# Patient Record
Sex: FEMALE | Race: WHITE | NOT HISPANIC OR LATINO | Employment: OTHER | URBAN - METROPOLITAN AREA
[De-identification: names, ages, dates, MRNs, and addresses within clinical notes are randomized per-mention and may not be internally consistent; named-entity substitution may affect disease eponyms.]

---

## 2017-07-17 ENCOUNTER — ALLSCRIPTS OFFICE VISIT (OUTPATIENT)
Dept: OTHER | Facility: OTHER | Age: 67
End: 2017-07-17

## 2017-07-17 DIAGNOSIS — M19.90 OSTEOARTHRITIS: ICD-10-CM

## 2017-07-17 DIAGNOSIS — Z00.00 ENCOUNTER FOR GENERAL ADULT MEDICAL EXAMINATION WITHOUT ABNORMAL FINDINGS: ICD-10-CM

## 2017-07-17 DIAGNOSIS — R01.1 CARDIAC MURMUR: ICD-10-CM

## 2017-07-17 DIAGNOSIS — Z87.19 PERSONAL HISTORY OF OTHER DISEASES OF THE DIGESTIVE SYSTEM: ICD-10-CM

## 2017-07-17 DIAGNOSIS — I34.1 NONRHEUMATIC MITRAL VALVE PROLAPSE: ICD-10-CM

## 2017-07-19 ENCOUNTER — TRANSCRIBE ORDERS (OUTPATIENT)
Dept: ADMINISTRATIVE | Facility: HOSPITAL | Age: 67
End: 2017-07-19

## 2017-07-19 DIAGNOSIS — I34.1 MITRAL VALVE PROLAPSE: Primary | ICD-10-CM

## 2017-07-25 ENCOUNTER — TRANSCRIBE ORDERS (OUTPATIENT)
Dept: ADMINISTRATIVE | Facility: HOSPITAL | Age: 67
End: 2017-07-25

## 2017-07-25 DIAGNOSIS — Z12.31 ENCOUNTER FOR MAMMOGRAM TO ESTABLISH BASELINE MAMMOGRAM: Primary | ICD-10-CM

## 2017-07-25 DIAGNOSIS — Z13.820 SCREENING FOR OSTEOPOROSIS: ICD-10-CM

## 2017-07-26 ENCOUNTER — GENERIC CONVERSION - ENCOUNTER (OUTPATIENT)
Dept: OTHER | Facility: OTHER | Age: 67
End: 2017-07-26

## 2017-07-26 LAB
A/G RATIO (HISTORICAL): 2 (ref 1.2–2.2)
ALBUMIN SERPL BCP-MCNC: 4.5 G/DL (ref 3.6–4.8)
ALP SERPL-CCNC: 84 IU/L (ref 39–117)
ALT SERPL W P-5'-P-CCNC: 18 IU/L (ref 0–32)
AST SERPL W P-5'-P-CCNC: 22 IU/L (ref 0–40)
BASOPHILS # BLD AUTO: 0 %
BASOPHILS # BLD AUTO: 0 X10E3/UL (ref 0–0.2)
BILIRUB SERPL-MCNC: 0.4 MG/DL (ref 0–1.2)
BUN SERPL-MCNC: 22 MG/DL (ref 8–27)
BUN/CREA RATIO (HISTORICAL): 29 (ref 12–28)
CALCIUM SERPL-MCNC: 9.8 MG/DL (ref 8.7–10.3)
CHLORIDE SERPL-SCNC: 101 MMOL/L (ref 96–106)
CHOLEST SERPL-MCNC: 264 MG/DL (ref 100–199)
CHOLEST/HDLC SERPL: 3.7 RATIO UNITS (ref 0–4.4)
CO2 SERPL-SCNC: 25 MMOL/L (ref 18–29)
CREAT SERPL-MCNC: 0.76 MG/DL (ref 0.57–1)
DEPRECATED RDW RBC AUTO: 12.8 % (ref 12.3–15.4)
EGFR AFRICAN AMERICAN (HISTORICAL): 95 ML/MIN/1.73
EGFR-AMERICAN CALC (HISTORICAL): 83 ML/MIN/1.73
EOSINOPHIL # BLD AUTO: 0.1 X10E3/UL (ref 0–0.4)
EOSINOPHIL # BLD AUTO: 2 %
GLUCOSE SERPL-MCNC: 101 MG/DL (ref 65–99)
HCT VFR BLD AUTO: 43.3 % (ref 34–46.6)
HDLC SERPL-MCNC: 71 MG/DL
HGB BLD-MCNC: 14.6 G/DL (ref 11.1–15.9)
IMM.GRANULOCYTES (CD4/8) (HISTORICAL): 0 %
IMM.GRANULOCYTES (CD4/8) (HISTORICAL): 0 X10E3/UL (ref 0–0.1)
INTERPRETATION (HISTORICAL): NORMAL
LDLC SERPL CALC-MCNC: 167 MG/DL (ref 0–99)
LYMPHOCYTES # BLD AUTO: 2.1 X10E3/UL (ref 0.7–3.1)
LYMPHOCYTES # BLD AUTO: 37 %
MCH RBC QN AUTO: 32.5 PG (ref 26.6–33)
MCHC RBC AUTO-ENTMCNC: 33.7 G/DL (ref 31.5–35.7)
MCV RBC AUTO: 96 FL (ref 79–97)
MONOCYTES # BLD AUTO: 0.4 X10E3/UL (ref 0.1–0.9)
MONOCYTES (HISTORICAL): 7 %
NEUTROPHILS # BLD AUTO: 3 X10E3/UL (ref 1.4–7)
NEUTROPHILS # BLD AUTO: 54 %
PLATELET # BLD AUTO: 248 X10E3/UL (ref 150–379)
POTASSIUM SERPL-SCNC: 4.5 MMOL/L (ref 3.5–5.2)
RBC (HISTORICAL): 4.49 X10E6/UL (ref 3.77–5.28)
SODIUM SERPL-SCNC: 142 MMOL/L (ref 134–144)
TOT. GLOBULIN, SERUM (HISTORICAL): 2.2 G/DL (ref 1.5–4.5)
TOTAL PROTEIN (HISTORICAL): 6.7 G/DL (ref 6–8.5)
TRIGL SERPL-MCNC: 131 MG/DL (ref 0–149)
VLDLC SERPL CALC-MCNC: 26 MG/DL (ref 5–40)
WBC # BLD AUTO: 5.6 X10E3/UL (ref 3.4–10.8)

## 2017-07-27 LAB
AMYLASE (HISTORICAL): 112 U/L (ref 31–124)
BACTERIA UR QL AUTO: NORMAL
BILIRUB UR QL STRIP: NEGATIVE
COLOR UR: YELLOW
COMMENT (HISTORICAL): CLEAR
FECAL OCCULT BLOOD DIAGNOSTIC (HISTORICAL): NEGATIVE
GLUCOSE (HISTORICAL): NEGATIVE
KETONES UR STRIP-MCNC: NEGATIVE MG/DL
LEUKOCYTE ESTERASE UR QL STRIP: NEGATIVE
LIPASE SERPL-CCNC: 46 U/L (ref 0–59)
MICROSCOPIC EXAMINATION (HISTORICAL): NORMAL
MICROSCOPIC EXAMINATION (HISTORICAL): NORMAL
MUCUS THREADS (HISTORICAL): PRESENT
NITRITE UR QL STRIP: NEGATIVE
NON-SQ EPI CELLS URNS QL MICRO: NORMAL /HPF
PH UR STRIP.AUTO: 6 [PH] (ref 5–7.5)
PROT UR STRIP-MCNC: NEGATIVE MG/DL
RBC (HISTORICAL): NORMAL /HPF
SP GR UR STRIP.AUTO: 1.02 (ref 1–1.03)
URINALYSIS (UA) (HISTORICAL): NORMAL
UROBILINOGEN UR QL STRIP.AUTO: 0.2 EU/DL (ref 0.2–1)
WBC # BLD AUTO: NORMAL /HPF

## 2017-07-28 ENCOUNTER — GENERIC CONVERSION - ENCOUNTER (OUTPATIENT)
Dept: OTHER | Facility: OTHER | Age: 67
End: 2017-07-28

## 2017-07-28 ENCOUNTER — HOSPITAL ENCOUNTER (OUTPATIENT)
Dept: NON INVASIVE DIAGNOSTICS | Facility: HOSPITAL | Age: 67
Discharge: HOME | End: 2017-07-28
Attending: FAMILY MEDICINE
Payer: COMMERCIAL

## 2017-07-28 DIAGNOSIS — I34.1 NONRHEUMATIC MITRAL VALVE PROLAPSE: ICD-10-CM

## 2017-07-28 DIAGNOSIS — R01.1 CARDIAC MURMUR: ICD-10-CM

## 2017-07-28 LAB
T4 FREE SERPL-MCNC: 1.1 NG/DL (ref 0.82–1.77)
TSH SERPL DL<=0.05 MIU/L-ACNC: 4.82 UIU/ML (ref 0.45–4.5)

## 2017-07-28 PROCEDURE — 93306 TTE W/DOPPLER COMPLETE: CPT

## 2017-08-18 ENCOUNTER — GENERIC CONVERSION - ENCOUNTER (OUTPATIENT)
Dept: OTHER | Facility: OTHER | Age: 67
End: 2017-08-18

## 2017-08-19 ENCOUNTER — GENERIC CONVERSION - ENCOUNTER (OUTPATIENT)
Dept: OTHER | Facility: OTHER | Age: 67
End: 2017-08-19

## 2017-08-19 DIAGNOSIS — E03.9 HYPOTHYROIDISM: ICD-10-CM

## 2017-08-19 DIAGNOSIS — E04.1 NONTOXIC SINGLE THYROID NODULE: ICD-10-CM

## 2017-08-23 ENCOUNTER — HOSPITAL ENCOUNTER (OUTPATIENT)
Dept: RADIOLOGY | Facility: HOSPITAL | Age: 67
Discharge: HOME | End: 2017-08-23
Payer: COMMERCIAL

## 2017-08-23 DIAGNOSIS — Z12.31 ENCOUNTER FOR MAMMOGRAM TO ESTABLISH BASELINE MAMMOGRAM: ICD-10-CM

## 2017-08-23 DIAGNOSIS — Z13.820 SCREENING FOR OSTEOPOROSIS: ICD-10-CM

## 2017-08-23 PROCEDURE — 77080 DXA BONE DENSITY AXIAL: CPT

## 2017-08-23 PROCEDURE — G0202 SCR MAMMO BI INCL CAD: HCPCS

## 2017-08-29 LAB — TSH SERPL DL<=0.05 MIU/L-ACNC: 3.28 UIU/ML (ref 0.45–4.5)

## 2017-08-31 ENCOUNTER — HOSPITAL ENCOUNTER (OUTPATIENT)
Dept: RADIOLOGY | Facility: HOSPITAL | Age: 67
Discharge: HOME | End: 2017-08-31
Attending: FAMILY MEDICINE
Payer: COMMERCIAL

## 2017-08-31 DIAGNOSIS — E03.9 HYPOTHYROIDISM: ICD-10-CM

## 2017-08-31 DIAGNOSIS — E04.1 NONTOXIC SINGLE THYROID NODULE: ICD-10-CM

## 2017-08-31 PROCEDURE — 76536 US EXAM OF HEAD AND NECK: CPT

## 2017-09-12 ENCOUNTER — GENERIC CONVERSION - ENCOUNTER (OUTPATIENT)
Dept: OTHER | Facility: OTHER | Age: 67
End: 2017-09-12

## 2017-11-27 ENCOUNTER — GENERIC CONVERSION - ENCOUNTER (OUTPATIENT)
Dept: OTHER | Facility: OTHER | Age: 67
End: 2017-11-27

## 2017-12-03 ENCOUNTER — GENERIC CONVERSION - ENCOUNTER (OUTPATIENT)
Dept: OTHER | Facility: OTHER | Age: 67
End: 2017-12-03

## 2017-12-14 ENCOUNTER — ALLSCRIPTS OFFICE VISIT (OUTPATIENT)
Dept: OTHER | Facility: OTHER | Age: 67
End: 2017-12-14

## 2017-12-15 NOTE — PROGRESS NOTES
Assessment  1  Subungual hematoma of finger, initial encounter (923 3) (S60 10XA)  2  Dog bite (879 8,E906 0) (W54  0XXA)    Plan  Dog bite    · Administered: Adacel 5-2-15 5 LF-MCG/0 5 Intramuscular Suspension  Subungual hematoma of finger, initial encounter    · Start: Cephalexin 500 MG Oral Capsule; TAKE 1 CAPSULE EVERY 12 HOURS DAILY   · Evacuate subungual hematoma - POC; Status:Active - Perform Order; Requestedfor:94Qfx2943;     Discussion/Summary    Update tetanus today  rto one week recheck, sooner if sx worsen or sx of infection develop  The patient was counseled regarding instructions for management,-- risk factor reductions,-- impressions,-- risks and benefits of treatment options,-- importance of compliance with treatment  Possible side effects of new medications were reviewed with the patient/guardian today  The treatment plan was reviewed with the patient/guardian  The patient/guardian understands and agrees with the treatment plan      Chief Complaint  Pt  presents with left thumb swelling/injury in nail bed  ck/lpn      Advance Directives  Advance Directive [facility]:  YES - Patient has an advance health care directive  History of Present Illness  HPI: pt here for new c/o injury to the left thumb  SHe reports her dog bit but did not penetrate the nail or the tissue of the thumb  SHe reports since that time a black area at the base of the nail and pain in the thumb  no tx at home  She states the pain is moderate at rest and severe with compression  Pt states her dog is fully immunized including rabies  Tetanus is outdated  Review of Systems   Constitutional: No fever, no chills, feels well, no tiredness, no recent weight gain or loss  ENT: no ear ache, no loss of hearing, no nosebleeds or nasal discharge, no sore throat or hoarseness  Cardiovascular: no complaints of slow or fast heart rate, no chest pain, no palpitations, no leg claudication or lower extremity edema    Respiratory: no complaints of shortness of breath, no wheezing, no dyspnea on exertion, no orthopnea or PND  Gastrointestinal: no complaints of abdominal pain, no constipation, no nausea or diarrhea, no vomiting, no bloody stools  Integumentary: as noted in HPI  Active Problems  1  Arthritis (716 90) (M19 90)  2  Encounter for screening for malignant neoplasm of colon (V76 51) (Z12 11)  3  Encounter for screening mammogram for malignant neoplasm of breast (V76 12) (Z12 31)  4  Heart murmur (785 2) (R01 1)  5  History of gastric ulcer (V12 79) (Z87 19)  6  Hypothyroidism (244 9) (E03 9)  7  Mitral valve prolapse (424 0) (I34 1)  8  Need for pneumococcal vaccination (V03 82) (Z23)  9  Thyroid cyst (246 2) (E04 1)  10  Welcome to Medicare preventive visit (V70 0) (Z00 00)    Past Medical History  1  History of acute bronchitis (V12 69) (Z87 09)  2  History of acute sinusitis (V12 69) (Z87 09)  3  History of endometriosis (V13 29) (Z87 42)  4  History of mitral valve prolapse (V12 59) (Z86 79)  5  History of ulceration (V13 89) (W88 857)    Family History  Mother   1  Family history of Cerebrovascular accident (CVA), unspecified mechanism  2  Family history of cardiac disorder (V17 49) (Z82 49)  Father   3  Family history of cardiac disorder (V17 49) (Z82 49)  Family History Reviewed: The family history was reviewed and updated today  Social History   · Former smoker (T51 24) (M13 158)  The social history was reviewed and updated today  Surgical History  1  History of Hysterectomy  Surgical History Reviewed: The surgical history was reviewed and updated today  Current Meds  1  Baby Aspirin 81 MG CHEW; 1 Every Day; Therapy: 89Yjw4005 to  Requested for: 52RSM6391 Recorded  2  Centrum Oral Tablet; Therapy: (Recorded:94Muf6367) to Recorded  3  Levothyroxine Sodium 25 MCG Oral Tablet; One tab po qd; Therapy: 73AMZ6816 to (Last Rx:31Oct2017)  Requested for: 31Oct2017 Ordered  4   Vagifem 10 MCG Vaginal Tablet; Therapy: (Recorded:76Ggl6218) to Recorded    The medication list was reviewed and updated today  Allergies  1  No Known Drug Allergies    Vitals   Recorded: 98INF2711 09:46AM   Temperature 98 6 F, Tympanic   Heart Rate 78, L Radial   Pulse Quality Normal, L Radial   Respiration Quality Normal   Respiration 14   Systolic 976, LUE, Sitting   Diastolic 80, LUE, Sitting   Height 5 ft 4 75 in   Weight 155 lb    BMI Calculated 25 99   BSA Calculated 1 77     Physical Exam   Constitutional  General appearance: No acute distress, well appearing and well nourished  Eyes  Conjunctiva and lids: No swelling, erythema or discharge  Ears, Nose, Mouth, and Throat  External inspection of ears and nose: Normal    Skin  Skin and subcutaneous tissue: Abnormal  -- sub uncal hematoma nail bed left thumb, moderate swelling of the thumb from the dip to tip, tender to palpation  Additional Exam:  informed consent obtained  Sterile prep with alcohol and Betadine  The subungual hematoma was decompressed with bovie tip with drainage of a moderate amount of blood  Pt pain level pre procedure was 10 and post procedure down to 5  Sensory intact and marked reduction in swelling post procedure  cap refill <3 sec at the finger tip, color pink  pt john procedure well  Attending Note  Collaborating Physician Note: Collaborating Note: I agree with the Advanced Practitioner note        Signatures   Electronically signed by : Kierra Miranda; Dec 14 2017 10:29AM EST                       (Author)    Electronically signed by : Alice Cortez DO; Dec 14 2017  5:05PM EST                       (Author)

## 2018-01-11 NOTE — RESULT NOTES
Verified Results  US THYROID 93Xnu6918 07:13AM Dagmar Slider    Order Number: [id number]    - Patient Instructions: To schedule this appointment, please contact Central Scheduling at [phone]  Test Name Result Flag Reference   US THYROID (Report)     THYROID ULTRASOUND     INDICATION: Hypothyroidism     COMPARISON: None  TECHNIQUE:  Ultrasound of the thyroid was performed with a high frequency linear transducer in transverse and sagittal planes including volumetric imaging sweeps as well as traditional still imaging technique  FINDINGS:   Thyroid parenchyma is diffusely heterogeneous in echotexture  There are numerous very tiny simple cysts throughout the thyroid  Right gland: 4 6 x 1 4 x 1 0 cm  There are no nodules that require follow-up or workup  Left gland: 5 1 x 1 1 x 1 1 cm  There are no nodules that require follow-up or workup  Isthmus: 0 3 cm in AP dimension  There are no nodules that require follow-up or workup  IMPRESSION:     Diffusely heterogeneous thyroid gland containing numerous very tiny simple cysts  There are no nodules that require follow-up or workup  Reference: 2015 American Thyroid Association Management Guidelines for Adult Patients with Thyroid Nodules and Differentiated Thyroid Cancer  Thyroid, Volume 26, Number 1, 2016               Workstation performed: [id number]     Signed by:   Juliet Vela MD   9/1/17     (1) TSH 25NYQ6121 09:02AM Dagmar Slider     Test Name Result Flag Reference   TSH 3 280 uIU/mL  0 450-4 500

## 2018-01-13 VITALS
RESPIRATION RATE: 16 BRPM | WEIGHT: 157 LBS | OXYGEN SATURATION: 97 % | BODY MASS INDEX: 26.16 KG/M2 | HEIGHT: 65 IN | HEART RATE: 76 BPM | DIASTOLIC BLOOD PRESSURE: 78 MMHG | SYSTOLIC BLOOD PRESSURE: 138 MMHG | TEMPERATURE: 98.7 F

## 2018-01-16 NOTE — PROGRESS NOTES
Assessment    1  Welcome to Medicare preventive visit (V70 0) (Z00 00)   2  Mitral valve prolapse (424 0) (I34 1)   3  Heart murmur (785 2) (R01 1)   4  Need for pneumococcal vaccination (V03 82) (Z23)    Plan  Arthritis, Health Maintenance, History of gastric ulcer, Mitral valve prolapse    · (1) AMYLASE; Status:Active; Requested for:25Seo8467;    · (1) CBC/PLT/DIFF; Status:Active; Requested for:04Kuj3200;    · (1) COMPREHENSIVE METABOLIC PANEL; Status:Active; Requested for:97Gpw3424;    · (1) LIPASE; Status:Active; Requested for:38Djj7446;    · (1) LIPID PANEL, FASTING; Status:Active; Requested for:08Eqj2012;    · (1) TSH WITH FT4 REFLEX; Status:Active; Requested for:55Bzz7464;    · (1) URINALYSIS w URINE C/S REFLEX (will reflex a microscopy if leukocytes, occult  blood, or nitrites are not within normal limits); Status:Active; Requested for:91Ymi9511; Health Maintenance    · * DXA BONE DENSITY SPINE HIP AND PELVIS; Status:Hold For - Scheduling; Requested  for:12Sek6756;    · *VB - Urinary Incontinence Screen (Dx Z13 89 Screen for UI); Status:Complete;   Done:  72NGO5053 01:14PM  Heart murmur, Mitral valve prolapse    · ECHO COMPLETE WITH CONTRAST IF INDICATED; Status:Need Information - Financial  Authorization; Requested for:35Cyg6742;   Need for pneumococcal vaccination    · Prevnar 13 Intramuscular Suspension    Discussion/Summary  Impression: Welcome to Medicare Visit, with preventive exam as well as age and risk appropriate counseling completed  Chief Complaint  pt here for Welcome to Medicare  Shelley ramachandran/jina      Advance Directives  Advance Directive [facility]:   The patient is in agreement to receive the Advance Care Planning service    YES - Patient has an advance health care directive  The patient has a living will located  in patient's home  The patient has a signed POLST form located in patient's home  Summary of Advance Directive Conversation  pt was given 5 wishes to read and review and return  History of Present Illness  The patient is being seen for the welcome to medicare visit  Medicare Screening and Risk Factors   Hospitalizations: no previous hospitalizations  Once per lifetime medicare screening tests: ECG has not been done and AAA screening US has not yet been done  Medicare Screening Tests Risk Questions   Osteoporosis risk assessment: , female gender and over 48years of age, but none indicated  HIV risk assessment: none indicated  Drug and Alcohol Use: The patient is a former cigarette smoker  The patient reports frequent alcohol use and drinking 2x drinks per week  Alcohol concern:   The patient has no concerns about alcohol abuse  She has never used illicit drugs  Diet and Physical Activity: Current diet includes well balanced meals  Exercise: walking 4-5 miles a day minutes per day  Mood Disorder and Cognitive Impairment Screening: She denies feeling down, depressed, or hopeless over the past two weeks  She denies feeling little interest or pleasure in doing things over the past two weeks  Cognitive impairment screening: denies difficulty learning/retaining new information, denies difficulty handling complex tasks, denies difficulty with reasoning, denies difficulty with spatial ability and orientation, denies difficulty with language and denies difficulty with behavior  Functional Ability/Level of Safety: Hearing is normal bilaterally, normal in the right ear and normal in the left ear  The patient is currently able to do activities of daily living without limitations, able to do instrumental activities of daily living without limitations, able to participate in social activities without limitations and able to drive without limitations   Activities of daily living details: does not need help using the phone, no transportation help needed, does not need help shopping, no meal preparation help needed, does not need help doing housework, does not need help doing laundry, does not need help managing medications and does not need help managing money  Fall risk factors: The patient fell 0 times in the past 12 months , no polypharmacy, no alcohol use, no mobility impairment, no antidepressant use, no deconditioning, no postural hypotension, no sedative use, no visual impairment, no urinary incontinence, no antihypertensive use, no cognitive impairment, up and go test was normal and no previous fall  Home safety risk factors:  no grab bars in the bathroom, but no unfamiliar surroundings, no loose rugs, no poor household lighting, no uneven floors, no household clutter and handrails on the stairs  Advance Directives: Advance directives: living will, durable power of  for health care directives and advance directives  end of life decisions were reviewed with the patient  Co-Managers and Medical Equipment/Suppliers: See Patient Care Team      Patient Care Team    Care Team Member Role Specialty Office Number   Christophe Saucedo MD  Dermatology [phone]   Dario Johnston MD  Vascular Surgery [phone]   Jerilyn Angulo MD  Ophthalmology [phone]     GI -Dr Villela     Review of Systems    Constitutional: negative  Eyes: negative  ENT: negative  Cardiovascular: MVP murmur  Respiratory: negative  Gastrointestinal: hx of gastric ulcer  Genitourinary: negative  Musculoskeletal: occ jt/mm pains  Integumentary and Breasts: skin lesion  Neurological: negative  Psychiatric: negative  Endocrine: negative  Hematologic and Lymphatic: negative  Active Problems    1  Arthritis (716 90) (M19 90)   2  History of gastric ulcer (V12 79) (Z87 19)   3  Mitral valve prolapse (424 0) (I34 1)   4  Need for pneumococcal vaccination (V03 82) (Z23)    Past Medical History    1  History of acute bronchitis (V12 69) (Z87 09)   2  History of acute sinusitis (V12 69) (Z87 09)   3   History of endometriosis (V13 29) (Z87 42)   4  History of mitral valve prolapse (V12 59) (Z86 79)   5  History of ulceration (V13 89) (E44 263)    Surgical History    1  History of Hysterectomy    hyster sec to adenomyosis(?)     Family History  Mother    1  Family history of Cerebrovascular accident (CVA), unspecified mechanism   2  Family history of cardiac disorder (V17 49) (Z82 49)  Father    3  Family history of cardiac disorder (V17 49) (Z82 49)    Social History    · Former smoker (F31 23) (B03 655)    Current Meds   1  Baby Aspirin 81 MG CHEW; 1 Every Day; Therapy: 43Xll4126 to  Requested for: 82GUU9213 Recorded   2  Centrum Oral Tablet; Therapy: (Recorded:38Ssq7933) to Recorded   3  Vagifem 10 MCG Vaginal Tablet; Therapy: (Recorded:84Pfl2231) to Recorded    Allergies    1  No Known Drug Allergies    Immunizations  Influenza --- [place]: 21-Sep-2010  (60y); Series2: 12-Nov-2012  (62y); Nadia Miss: 17-Oct-2016   (66y)   PPD --- Cristobal [place]field: 21-Sep-2010  (60y)     Vitals  Signs   Recorded: 18DIC3849 01:00PM   Temperature: 98 7 F, Temporal  Heart Rate: 76, R Radial  Pulse Quality: Normal, R Radial  Respiration Quality: Normal  Respiration: 16  Systolic: 430, RUE, Sitting  Diastolic: 78, RUE, Sitting  Height: 5 ft 4 75 in  Weight: 157 lb   BMI Calculated: 26 33  BSA Calculated: 1 78  O2 Saturation: 97    Results/Data  *VB - Urinary Incontinence Screen (Dx Z13 89 Screen for UI) 54HXC3450 01:14PM Greg Lehman     Test Name Result Flag Reference   Urinary Incontinence Assessment 93Flm5015       Falls Risk Assessment (Dx Z13 89 Screen for Neurologic Disorder) 13AKU1095 01:13PM User, [facility]     Test Name Result Flag Reference   Falls Risk      No falls in the past year     PHQ-2 Adult Depression Screening 85Vgd7253 01:12PM User, [facility]     Test Name Result Flag Reference   PHQ-2 Adult Depression Score 0     Over the last two weeks, how often have you been bothered by any of the following problems?   Little interest or pleasure in doing things: Not at all - 0  Feeling down, depressed, or hopeless: Not at all - 0   PHQ-2 Adult Depression Screening Negative       A 12 lead ECG was performed and was normal  No acute ischemia  Procedure    Procedure:  pt sees Dr Scar Mora yearly        Signatures   Electronically signed by : WILLIAMS Vicente ; Jul 19 2017  7:06AM EST                       (Author)

## 2018-01-16 NOTE — RESULT NOTES
Verified Results  ECHO COMPLETE WITH CONTRAST IF INDICATED 52Hou0437 08:10AM Shikha Winn Order Number: [id number]    - Patient Instructions: To schedule this appointment, please contact Central Scheduling at [phone]  Test Name Result Flag Reference   ECHO COMPLETE WITH CONTRAST IF INDICATED (Report)     [address]   [address] [facility]   [place] [address]   [phone]     Transthoracic Echocardiogram   2D and M-mode     Study date: 2017     Patient: Anirudh Sutton   MR number: [id number]   Account number: [de-identified]   : 1950   Age: 74 years   Gender: Female   Status: Routine   Location: Echo lab   Height: 67 in   Weight: 154 7 lb   BP: 130/ 81 mmHg     Indications: MVP     Diagnoses: 424 0 - MITRAL VALVE DISORDER     Sonographer: SKYLA Gauthier   Primary Physician: Laurent Mark MD   Referring Physician: William Daly MD   Group: Z[facility] Martin   Interpreting Physician: Martita Bella MD     SUMMARY     PROCEDURE INFORMATION:   Image quality was adequate  LEFT VENTRICLE:   Systolic function was normal by Teichholz  Ejection fraction was estimated in the range of 55 % to 60 % to be 61 %  There were no regional wall motion abnormalities  MITRAL VALVE:   By the updated ASE guidelines, there is no evidence of significant mitral valve prolapse     TRICUSPID VALVE:   The tricuspid jet envelope definition was inadequate for estimation of RV systolic pressure  There are no indirect findings (abnormal RV volume or geometry, altered pulmonary flow velocity profile, or leftward septal displacement) which   would suggest moderate or severe pulmonary hypertension  HISTORY: PRIOR HISTORY: Patient has no history of cardiovascular disease  PROCEDURE: The procedure was performed in the echo lab  This was a routine study  The transthoracic approach was used  The study included complete 2D imaging and M-mode   The heart rate was 45 bpm, at the start of the study  Images were   obtained from the parasternal, apical, subcostal, and suprasternal notch acoustic windows  Image quality was adequate  LEFT VENTRICLE: Size was normal  Systolic function was normal by Teichholz  Ejection fraction was estimated in the range of 55 % to 60 % to be 61 %  There were no regional wall motion abnormalities  Wall thickness was normal  No evidence   of apical thrombus  DOPPLER: Left ventricular diastolic function parameters were normal      RIGHT VENTRICLE: The size was normal  Systolic function was normal  Wall thickness was normal      LEFT ATRIUM: Size was normal      RIGHT ATRIUM: Size was normal      MITRAL VALVE: By the updated ASE guidelines, there is no evidence of significant mitral valve prolapse There was mild myxomatous thickening  There was normal leaflet separation  DOPPLER: The transmitral velocity was within the normal   range  There was no evidence for stenosis  There was no significant regurgitation  AORTIC VALVE: The valve was trileaflet  Leaflets exhibited mildly increased thickness and normal cuspal separation  DOPPLER: Transaortic velocity was within the normal range  There was no evidence for stenosis  There was no significant   regurgitation  TRICUSPID VALVE: The valve structure was normal  There was normal leaflet separation  DOPPLER: The transtricuspid velocity was within the normal range  There was no evidence for stenosis  There was trace regurgitation  The tricuspid jet   envelope definition was inadequate for estimation of RV systolic pressure  There are no indirect findings (abnormal RV volume or geometry, altered pulmonary flow velocity profile, or leftward septal displacement) which would suggest   moderate or severe pulmonary hypertension  PULMONIC VALVE: Leaflets exhibited normal thickness, no calcification, and normal cuspal separation  DOPPLER: The transpulmonic velocity was within the normal range   There was no significant regurgitation  PERICARDIUM: There was no pericardial effusion  The pericardium was normal in appearance  AORTA: The root exhibited normal size  SYSTEMIC VEINS: IVC: The inferior vena cava was normal in size  SYSTEM MEASUREMENT TABLES     2D mode   AoR Diam 2D: 3 1 cm   LA Diam (2D): 3 7 cm   LA/Ao (2D): 1 19   FS (2D Teich): 27 5 %   IVSd (2D): 0 91 cm   LVDEV: 100 cmï¾³   LVEDV MOD BP: 132 cmï¾³   LVESV: 46 8 cmï¾³   LVIDd(2D): 4 66 cm   LVISd (2D): 3 38 cm   LVOT Area 2D: 3 14 cm squared   LVPWd (2D): 1 04 cm   SV (Teich): 53 2 cmï¾³     Apical four chamber   LVEF A4C: 61 %     Apical two chamber   LVEF A2C: 60 %     Unspecified Scan Mode   JAJA Cont Eq (Peak Jose): 2 46 cm squared   LVOT Diam : 2 cm   LVOT Vmax: 884 mm/s   LVOT Vmax; Mean: 884 mm/s   Peak Grad ; Mean: 3 mm[Hg]   MV Peak A Jose: 573 mm/s   MV Peak E Jose  Mean: 652 mm/s   MVA (PHT): 4 58 cm squared   PHT: 48 ms   Max PG: 15 mm[Hg]   V Max: 2080 mm/s   Vmax: 1920 mm/s   RA Area: 15 cm squared   RA Volume: 40 6 cmï¾³   TAPSE: 2 1 cm     [employer] Accredited Echocardiography Laboratory     Prepared and electronically signed by     Justus Enamorado MD   Signed 28-Jul-2017 10:43:57     (1) LIPID PANEL, FASTING [id number] 09:01AM [facility]     Test Name Result Flag Reference   Cholesterol, Total 264 mg/dL H 100-199   Triglycerides 131 mg/dL  0-149   HDL Cholesterol 71 mg/dL  >39   VLDL Cholesterol Michael 26 mg/dL  5-40   LDL Cholesterol Calc 167 mg/dL H 0-99   T  Chol/HDL Ratio 3 7 ratio units  0 0-4 4   T  Chol/HDL Ratio                                                             Men  Women                                               1/2 Avg  Risk  3 4    3 3                                                   Avg Risk  5 0    4 4                                                2X Avg  Risk  9 6    7 1                                                3X Avg  Risk 23 4   11 0

## 2018-01-22 VITALS
HEIGHT: 65 IN | SYSTOLIC BLOOD PRESSURE: 122 MMHG | HEART RATE: 84 BPM | WEIGHT: 158 LBS | TEMPERATURE: 98.7 F | BODY MASS INDEX: 26.33 KG/M2 | DIASTOLIC BLOOD PRESSURE: 78 MMHG | RESPIRATION RATE: 16 BRPM

## 2018-01-22 VITALS
BODY MASS INDEX: 25.83 KG/M2 | TEMPERATURE: 98.6 F | HEART RATE: 78 BPM | DIASTOLIC BLOOD PRESSURE: 80 MMHG | WEIGHT: 155 LBS | SYSTOLIC BLOOD PRESSURE: 150 MMHG | RESPIRATION RATE: 14 BRPM | HEIGHT: 65 IN

## 2018-01-23 NOTE — RESULT NOTES
Verified Results  COLONOSCOPY [id number] 12:00AM [facility]     Test Name Result Flag Reference   Colonoscopy SEE SCANNED IMAGE        Summary / No summary entered :      No summary entered   Documents attached :      sColonoscopies - Madelin Arvind: [id number] - Image      Encounter - Sonia Escobar - (Family Medicine) (Result      Document)

## 2018-07-18 ENCOUNTER — OFFICE VISIT (OUTPATIENT)
Dept: FAMILY MEDICINE CLINIC | Facility: CLINIC | Age: 68
End: 2018-07-18
Payer: COMMERCIAL

## 2018-07-18 VITALS
BODY MASS INDEX: 25.36 KG/M2 | WEIGHT: 157.8 LBS | RESPIRATION RATE: 14 BRPM | TEMPERATURE: 98.2 F | DIASTOLIC BLOOD PRESSURE: 84 MMHG | HEART RATE: 76 BPM | SYSTOLIC BLOOD PRESSURE: 102 MMHG | HEIGHT: 66 IN

## 2018-07-18 DIAGNOSIS — M85.80 OSTEOPENIA, UNSPECIFIED LOCATION: ICD-10-CM

## 2018-07-18 DIAGNOSIS — Z00.00 INITIAL MEDICARE ANNUAL WELLNESS VISIT: Primary | ICD-10-CM

## 2018-07-18 DIAGNOSIS — M72.0 DUPUYTREN'S CONTRACTURE OF HAND: ICD-10-CM

## 2018-07-18 DIAGNOSIS — E78.5 HYPERLIPIDEMIA, UNSPECIFIED HYPERLIPIDEMIA TYPE: ICD-10-CM

## 2018-07-18 DIAGNOSIS — Z00.00 HEALTHCARE MAINTENANCE: ICD-10-CM

## 2018-07-18 DIAGNOSIS — E55.9 VITAMIN D DEFICIENCY: ICD-10-CM

## 2018-07-18 DIAGNOSIS — E03.9 HYPOTHYROIDISM, UNSPECIFIED TYPE: ICD-10-CM

## 2018-07-18 DIAGNOSIS — R73.09 ABNORMAL GLUCOSE: ICD-10-CM

## 2018-07-18 PROCEDURE — 99214 OFFICE O/P EST MOD 30 MIN: CPT | Performed by: FAMILY MEDICINE

## 2018-07-18 PROCEDURE — 4040F PNEUMOC VAC/ADMIN/RCVD: CPT | Performed by: FAMILY MEDICINE

## 2018-07-18 PROCEDURE — 1036F TOBACCO NON-USER: CPT | Performed by: FAMILY MEDICINE

## 2018-07-18 PROCEDURE — 1101F PT FALLS ASSESS-DOCD LE1/YR: CPT | Performed by: FAMILY MEDICINE

## 2018-07-18 PROCEDURE — 3725F SCREEN DEPRESSION PERFORMED: CPT | Performed by: FAMILY MEDICINE

## 2018-07-18 PROCEDURE — 1160F RVW MEDS BY RX/DR IN RCRD: CPT | Performed by: FAMILY MEDICINE

## 2018-07-18 PROCEDURE — 3008F BODY MASS INDEX DOCD: CPT | Performed by: FAMILY MEDICINE

## 2018-07-18 PROCEDURE — G0438 PPPS, INITIAL VISIT: HCPCS | Performed by: FAMILY MEDICINE

## 2018-07-18 RX ORDER — LEVOTHYROXINE SODIUM 0.03 MG/1
1 TABLET ORAL DAILY
COMMUNITY
Start: 2017-08-18 | End: 2018-07-19 | Stop reason: SDUPTHER

## 2018-07-18 RX ORDER — ASPIRIN 81 MG/1
TABLET, CHEWABLE ORAL DAILY
COMMUNITY
Start: 2010-08-30

## 2018-07-18 RX ORDER — ESTRADIOL 10 UG/1
INSERT VAGINAL
COMMUNITY
End: 2022-06-14 | Stop reason: HOSPADM

## 2018-07-19 PROBLEM — R01.1 HEART MURMUR: Status: ACTIVE | Noted: 2017-07-19

## 2018-07-19 PROBLEM — E03.9 HYPOTHYROIDISM: Status: ACTIVE | Noted: 2017-08-18

## 2018-07-19 PROBLEM — M19.90 ARTHRITIS: Status: ACTIVE | Noted: 2017-07-17

## 2018-07-19 PROBLEM — I34.1 MITRAL VALVE PROLAPSE: Status: ACTIVE | Noted: 2017-07-17

## 2018-07-19 PROBLEM — E04.1 THYROID CYST: Status: ACTIVE | Noted: 2017-08-19

## 2018-07-19 RX ORDER — LEVOTHYROXINE SODIUM 0.03 MG/1
25 TABLET ORAL DAILY
Qty: 90 TABLET | Refills: 3 | Status: SHIPPED | OUTPATIENT
Start: 2018-07-19 | End: 2019-07-23 | Stop reason: SDUPTHER

## 2018-07-20 NOTE — PROGRESS NOTES
HPI:  Mari Fuller is a 79 y o  female here for her Initial Wellness Visit  Patient Active Problem List   Diagnosis    Arthritis    Heart murmur    Hypothyroidism    Mitral valve prolapse    Thyroid cyst     Past Medical History:   Diagnosis Date    Endometriosis     last assessed 07/17/2017    MVP (mitral valve prolapse) 1980     Past Surgical History:   Procedure Laterality Date    HYSTERECTOMY  01/17/1993     Family History   Problem Relation Age of Onset    Stroke Mother         CVA    Other Mother         cardiac disorder    Other Father         cardiac disorder     History   Smoking Status    Former Smoker   Smokeless Tobacco    Never Used     History   Alcohol Use    Yes     Comment: 4x weekly glass of wine      History   Drug Use No       Current Outpatient Prescriptions   Medication Sig Dispense Refill    aspirin 81 mg chewable tablet Chew daily      calcium carbonate 1250 MG capsule Take 1,250 mg by mouth 2 (two) times a day with meals      estradiol (VAGIFEM) 10 MCG TABS vaginal tablet Insert into the vagina      levothyroxine 25 mcg tablet Take 1 tablet (25 mcg total) by mouth daily 90 tablet 3    tretinoin (RETIN-A) 0 025 % cream APPLY AT NIGHT AS DIRECTED  1     No current facility-administered medications for this visit        No Known Allergies  Immunization History   Administered Date(s) Administered    Influenza TIV (IM) 09/21/2010, 11/12/2012, 10/17/2016, 08/30/2017    Pneumococcal Conjugate 13-Valent 07/17/2017    Tdap 12/14/2017    Tuberculin Skin Test-PPD Intradermal 09/21/2010       Patient Care Team:  Ivan Rosenthal MD as PCP - Arturo Mckeon MD      Medicare Screening Tests and Risk Assessments:  AWV Clinical     ISAR:   Previous hospitalizations?:  No       Once in a Lifetime Medicare Screening:   EKG performed?:  Yes    AAA screening performed? (if performed, please add date to Health Maintenance):  No       Medicare Screening Tests and Risk Assessment:   AAA Risk Assessment    Osteoporosis Risk Assessment     Female:  Yes   :  Yes    Age over 48:   Yes     Alcohol use:  Yes   HIV Risk Assessment    None indicated:  Yes        Drug and Alcohol Use:   Tobacco use    Cigarettes:  former smoker    Quit date:  7/18/1982   Tobacco use duration    Tobacco Cessation Readiness    Alcohol use    Alcohol use:  frequent use    Concern about alcohol use:  No Tolerance to alcohol:  No   Attempts to cut down:  No Guilt about use:  No   Patient concern:  No Annoyed by criticism:  No   Morning drinking:  No Family concern:  No   Alcohol Treatment Readiness   Illicit Drug Use    Drug use:  never    Drug type:  no sedative use       Diet & Exercise:   Diet   What is your diet?:  Regular   How many servings a day of the following:   Fruits and Vegetables:  5 or more Meat:  1-2   Whole Grains:  1 Simple Carbs:  0   Dairy:  1 Soda:  0   Coffee:  2 Tea:  0   Exercise    Do you currently exercise?:  yes   Times per week:  2     Type of exercise:  walking       Cognitive Impairment Screening:   Depression screening preformed:  No    Cognitive Impairment Screening    Do you have difficulty learning or retaining new information?:  No Do you have difficulty handling new tasks?:  No   Do you have difficulty with reasoning?:  No Do you have difficulty with spatial ability and orientation?:  No   Do you have difficulty with language?:  No Do you have difficulty with behavior?:  No       Functional Ability/Level of Safety:   Hearing    Hearing difficulties:  No Bilateral:  normal   Left:  normal    Hearing aid:  No    Hearing Impairment Assessment    Do your family members ever complain that you turn on the radio or T V  too loudly?:  No    Current Activities    Status:  unlimited ADL's, unlimited IADL's, unlimited social activities   Help needed with the folllowing:    Using the phone:  No Transportation:  No   Shopping:  No Preparing Meals:  No   Doing Housework:  No Doing Laundry:  No   Managing Medications:  No Managing Money:  No   ADL    Feeding:  Independant   Oral hygiene and Facial grooming:  Independant   Bathing:  Independant   Upper Body Dressing:  Independant   Lower Body Dressing:  Independant   Toileting:  Independant   Bed Mobility:  Independant   Fall Risk   Have you fallen in the last 12 months?:  No Are you unsteady on your feet?:  No    Are you taking any medications that may cause fatigue or dizziness?:  No    Do you rush to the bathroom potentially risking a fall?:  No   Injury History   Polypharmacy:  No Antidepressant Use:  No   Sedative Use:  No Antihypertensive Use:  No   Previous Fall:  No Alcohol Use:  Yes   Deconditioning:  No Visual Impairment:  No   Cogitive Impairment:  No Mmobility Impairment:  No   Postural Hypotension:  No Urinary Incontinence:  No       Home Safety:   Are there hazards in your environment?:  No   If you fell, would you need help to get back up from the ground?:  Yes Do you have problems or concerns getting in/out of a bed, chair, tub, or toilet?:  No   Do you feel unsteady when walking?:  No Is your activity limited by pain?:  No    Are emergency numbers kept by the phone and regularly updated?:  Yes   Are you and/or family members aware of the dangers of smoking in bed?:  Yes    Do you have working smoke alarms and fire extinguisher?:  Yes    Have you left the stove on unsupervised?:  No    Home Safety Risk Factors   Unfamilar with surroundings:  No Uneven floors:  No   Stairs or handrail saftey risk:  No Loose rugs:  No   Household clutter:  No Poor household lighting:  No   No grab bars in bathroom:  No Further evaluation needed:  No       Advanced Directives:   Advanced Directives    Living Will:  Yes Durable POA for healthcare:   Yes   Advanced directive:  Yes    Patient's End of Life Decisions        Urinary Incontinence:   Do you have urinary incontinence?:  No Do you have incomplete emptying?:  No   Do you urinate frequently?: No Do you have urinary urgency?:  No   Do you have urinary hesitancy?:  No Do you have dysuria (painful and/or difficult urination)?:  No   Do you have nocturia (waking up to urinate)?:  Yes Do you strain when urinating (have to push to urinate)?:  No   Do you have a weak stream when urinating?:  No Do you have intermittent streaming when urinating?:  No   Do you dribble urine after finishing?:  No    Do you have vaginal pressure?:  No Do you have vaginal dryness?:  Yes       Glaucoma:            Provider Screening     Preventative Screening/Counseling:   Cardiovascular Screening/Counseling:   (Labs Q5 years, EKG optional one-time)   General:  Risks and Benefits Discussed Counseling:  Healthy Diet, Healthy Weight, Improve Cholesterol, Improve Exercise Tolerance Due for: Lab Panel/Analytes:  Lipid Panel         Diabetes Screening/Counseling:   (2 tests/year if Pre-Diabetes or 1 test/year if no Diabetes)   General:  Risks and Benefits Discussed Counseling:  Healthy Diet, Healthy Weight, Improve Physical Activity Due for: Labs:  Blood Glucose         Colorectal Cancer Screening/Counseling:   (FOBT Q1 yr; Flex Sig Q4 yrs or Q10 yrs after Screening Colonoscopy; Screening Colonoscpy Q2 yrs High Risk or Q10 yrs Low Risk; Barium Enema Q2 yrs High Risk or Q4 yrs Low Risk)   General:  Risks and Benefits Discussed Counseling:  high fiber diet          Prostate Cancer Screening/Counseling:   (Annual)          Breast Cancer Screening/Counseling:   (Baseline Age 28 - 43; Annual Age 36+)   General:  Screening Current          Cervical Cancer Screening/Counseling:   (Annual for High Risk or Childbearing Age with Abnormal Pap in Last 3 yrs;  Every 2 all others)   General:  Screening Current           Osteoporosis Screening/Counseling:   (Every 2 Yrs if at risk or more if medically necessary)   General:  Risks and Benefits Discussed Counseling:  Calcium and Vitamin D Intake, Regular Weightbearing Exercise          AAA Screening/Counseling:   (Once per Lifetime with risk factors)    General:  Screening Not Indicated           Glaucoma Screening/Counseling:   (Annual)   General:  Screening Current          HIV Screening/Counseling:   (Voluntary; Once annually for high risk OR 3 times for Pregnancy at diagnosis of IUP; 3rd trimester; and at Labor   General:  Screening Not Indicated           Hepatitis C Screening:   Hepatitis C Counseling Provided:  Yes Hepatitis C Screening Accepted: Yes              Immunizations:   Influenza (annual):  Risks & Benefits Discussed, Influenza Recommended Annually   Pneumococcal (Once in a Lifetime):  Risks & Benefits Discussed   Hepatitis B Series (low risk patients):  Series Not Indicated   Zostavax (Medicare D Coverage, Pt >70 yo):  Risks & Benefits Discussed   TD (Non-Medicare Wellness  Visit required):  Risks & Benefits Discussed, Td Vaccine UTD   Tdap (Non-Medicare Wellness Visit required):  Risks & Benefits Discussed, Tdap Vaccine UTD       Other Preventative Couseling (Non-Medicare Wellness Visit Required):   nutrition counseling performed, car/seat belt/driving safety reviewed, Skin self-exam counseling given       Referrals (Non-Medicare Wellness Visit Required):   referred to orthopedics: Yes       Medical Equipment/Suppliers:   N/A            Visual Acuity Screening    Right eye Left eye Both eyes   Without correction:      With correction: 20/20 20/100 20/20     Reviewed Updated [facility] Prior Wellness Visits:   Last Medicare wellness visit information was reviewed, patient interviewed , no change since last AWVyes  Last Medicare wellness visit information was reviewed, patient interviewed and updates made to the record today yes    Assessment and Plan:  1  Initial Medicare annual wellness visit     2  Hypothyroidism, unspecified type  TSH, 3rd generation with T4 reflex    CBC and Platelet    levothyroxine 25 mcg tablet    check lab  Mail order rx sent in for 3mth supply w ref x3  adjust med prn pending lab result  3  Hyperlipidemia, unspecified hyperlipidemia type  Lipid panel    Comprehensive metabolic panel    Amylase    Lipase    check lipid profile and thyroid fxn  cont low chol diet  4  Vitamin D deficiency  Vitamin D 25 hydroxy    check level  cont otc supplement  5  Osteopenia, unspecified location  CBC and Platelet    Vitamin D 25 hydroxy    DEXA ordered by gyn  -findings reviewed w pt  cont reg wgt bearing exercise + ca/vit d/mag supp  6  Abnormal glucose  Amylase    Lipase    Hemoglobin A1C    check FBS and hga1c,   7   Healthcare maintenance  Hepatitis C antibody    check Hep C ab    8  Dupuytren's contracture of hand      ref to ortho--Pt established w [facility]--Dr Boris Lozano al       Health Maintenance Due   Topic Date Due    Hepatitis C Screening  1950    CRC Screening: Colonoscopy  1950    PNEUMOCOCCAL POLYSACCHARIDE VACCINE AGE 65 AND OVER  05/04/2017    MAMMOGRAM  08/23/2018

## 2018-07-20 NOTE — PROGRESS NOTES
Assessment/Plan:   Diagnoses and all orders for this visit:    Initial Medicare annual wellness visit    Hypothyroidism, unspecified type  Comments:  check lab  Mail order rx sent in for 3mth supply w ref x3  adjust med prn pending lab result  Orders:  -     TSH, 3rd generation with T4 reflex; Future  -     CBC and Platelet; Future  -     levothyroxine 25 mcg tablet; Take 1 tablet (25 mcg total) by mouth daily    Hyperlipidemia, unspecified hyperlipidemia type  Comments:  check lipid profile and thyroid fxn  cont low chol diet  Orders:  -     Lipid panel; Future  -     Comprehensive metabolic panel; Future  -     Amylase; Future  -     Lipase; Future    Vitamin D deficiency  Comments:  check level  cont otc supplement  Orders:  -     Vitamin D 25 hydroxy; Future    Osteopenia, unspecified location  Comments:  DEXA ordered by gyn  -findings reviewed w pt  cont reg wgt bearing exercise + ca/vit d/mag supp  Orders:  -     CBC and Platelet; Future  -     Vitamin D 25 hydroxy; Future    Abnormal glucose  Comments:  check FBS and hga1c,  Orders:  -     Amylase; Future  -     Lipase; Future  -     Hemoglobin A1C; Future    Healthcare maintenance  Comments:  check Hep C ab  Orders:  -     Hepatitis C antibody; Future    Other orders  -     estradiol (VAGIFEM) 10 MCG TABS vaginal tablet; Insert into the vagina  -     Discontinue: levothyroxine 25 mcg tablet; Take 1 tablet by mouth daily  -     aspirin 81 mg chewable tablet; Chew daily  -     tretinoin (RETIN-A) 0 025 % cream; APPLY AT NIGHT AS DIRECTED  -     calcium carbonate 1250 MG capsule; Take 1,250 mg by mouth 2 (two) times a day with meals            Subjective:      Patient ID: Verónica Franco is a 79 y o  female  Chief Complaint   Patient presents with   [facility] Wellness Visit       53-year-old patient in for follow-up of chronic conditions as well as her initial Medicare wellness visit (see separate note)  Patient is due for lab work    She is up-to-date with mammogram and DEXA scan as well as eye and dental care  She needs refill of her levothyroxine sent to a new [facility] in [place], [place]  Overall she has been feeling well  She does have some complaints of increased arthritic pains and start of deformities in digits of her hands as well as tissue thickening in the palms of her hands resulting in some bending of the pinky finger  Her last DEXA scan did show osteopenia for which she is taking calcium/vitamin-D/magnesium supplement plus engaging in weight bearing exercise on a regular basis  BP and BMI is within normal       The following portions of the patient's history were reviewed and updated as appropriate: allergies, current medications, past family history, past medical history, past social history, past surgical history and problem list      Review of Systems   Constitutional: Negative  Eyes: Negative  Respiratory: Negative  Cardiovascular: Negative  Gastrointestinal: Negative  Musculoskeletal: Positive for arthralgias, joint swelling and myalgias  Allergic/Immunologic: Positive for environmental allergies  Neurological: Negative  Psychiatric/Behavioral: Negative  Objective:    /84 (BP Location: Right arm, Patient Position: Sitting, Cuff Size: Standard)   Pulse 76   Temp 98 2 °F (36 8 °C)   Resp 14   Ht 5' 6" (1 676 m)   Wt 71 6 kg (157 lb 12 8 oz)   BMI 25 47 kg/m²        Physical Exam   Constitutional: She is oriented to person, place, and time  She appears well-developed and well-nourished  HENT:   Head: Normocephalic and atraumatic  Right Ear: External ear normal    Left Ear: External ear normal    Nose: Nose normal    Mouth/Throat: Oropharynx is clear and moist    Eyes: Conjunctivae and EOM are normal  Pupils are equal, round, and reactive to light  Neck: Normal range of motion  Neck supple  No thyromegaly present     Cardiovascular: Normal rate, regular rhythm and intact distal pulses  Exam reveals no gallop and no friction rub  Murmur heard  Pulmonary/Chest: Effort normal and breath sounds normal    Abdominal: Soft  Bowel sounds are normal  There is no tenderness  Musculoskeletal: Normal range of motion  She exhibits deformity  She exhibits no edema or tenderness  +arthritic changes digits in hands   Knots/cords of tissue under the skin of palms   w ?pulling of r pinky into bent position   Neurological: She is alert and oriented to person, place, and time  She displays normal reflexes  No cranial nerve deficit or sensory deficit  She exhibits normal muscle tone  Coordination normal    Skin: Skin is warm and dry  Capillary refill takes less than 2 seconds  No rash noted  Psychiatric: She has a normal mood and affect  Nursing note and vitals reviewed        Siobhan Weber MD

## 2018-07-24 LAB
25(OH)D3+25(OH)D2 SERPL-MCNC: 42.9 NG/ML (ref 30–100)
ALBUMIN SERPL-MCNC: 4.3 G/DL (ref 3.6–4.8)
ALBUMIN/GLOB SERPL: 2 {RATIO} (ref 1.2–2.2)
ALP SERPL-CCNC: 77 IU/L (ref 39–117)
ALT SERPL-CCNC: 15 IU/L (ref 0–32)
AMBIG ABBREV DEFAULT: NORMAL
AMBIG ABBREV DEFAULT: NORMAL
AMYLASE SERPL-CCNC: 89 U/L (ref 31–124)
AST SERPL-CCNC: 19 IU/L (ref 0–40)
BILIRUB SERPL-MCNC: 0.4 MG/DL (ref 0–1.2)
BUN SERPL-MCNC: 17 MG/DL (ref 8–27)
BUN/CREAT SERPL: 20 (ref 12–28)
CALCIUM SERPL-MCNC: 10 MG/DL (ref 8.7–10.3)
CHLORIDE SERPL-SCNC: 102 MMOL/L (ref 96–106)
CHOLEST SERPL-MCNC: 225 MG/DL (ref 100–199)
CO2 SERPL-SCNC: 22 MMOL/L (ref 20–29)
CREAT SERPL-MCNC: 0.85 MG/DL (ref 0.57–1)
ERYTHROCYTE [DISTWIDTH] IN BLOOD BY AUTOMATED COUNT: 13.2 % (ref 12.3–15.4)
GLOBULIN SER-MCNC: 2.2 G/DL (ref 1.5–4.5)
GLUCOSE SERPL-MCNC: 92 MG/DL (ref 65–99)
HBA1C MFR BLD: 5.4 % (ref 4.8–5.6)
HCT VFR BLD AUTO: 39.6 % (ref 34–46.6)
HCV AB S/CO SERPL IA: <0.1 S/CO RATIO (ref 0–0.9)
HDLC SERPL-MCNC: 72 MG/DL
HGB BLD-MCNC: 13.8 G/DL (ref 11.1–15.9)
LDLC SERPL CALC-MCNC: 131 MG/DL (ref 0–99)
LIPASE SERPL-CCNC: 40 U/L (ref 14–72)
MCH RBC QN AUTO: 32 PG (ref 26.6–33)
MCHC RBC AUTO-ENTMCNC: 34.8 G/DL (ref 31.5–35.7)
MCV RBC AUTO: 92 FL (ref 79–97)
PLATELET # BLD AUTO: 217 X10E3/UL (ref 150–379)
POTASSIUM SERPL-SCNC: 4.1 MMOL/L (ref 3.5–5.2)
PROT SERPL-MCNC: 6.5 G/DL (ref 6–8.5)
RBC # BLD AUTO: 4.31 X10E6/UL (ref 3.77–5.28)
SL AMB EGFR AFRICAN AMERICAN: 83 ML/MIN/1.73
SL AMB EGFR NON AFRICAN AMERICAN: 72 ML/MIN/1.73
SL AMB VLDL CHOLESTEROL CALC: 22 MG/DL (ref 5–40)
SODIUM SERPL-SCNC: 142 MMOL/L (ref 134–144)
TRIGL SERPL-MCNC: 108 MG/DL (ref 0–149)
TSH SERPL DL<=0.005 MIU/L-ACNC: 3.47 UIU/ML (ref 0.45–4.5)
WBC # BLD AUTO: 5.5 X10E3/UL (ref 3.4–10.8)

## 2018-08-10 ENCOUNTER — TRANSCRIBE ORDERS (OUTPATIENT)
Dept: ADMINISTRATIVE | Facility: HOSPITAL | Age: 68
End: 2018-08-10

## 2018-08-10 DIAGNOSIS — Z12.39 SCREENING BREAST EXAMINATION: Primary | ICD-10-CM

## 2018-08-24 ENCOUNTER — HOSPITAL ENCOUNTER (OUTPATIENT)
Dept: RADIOLOGY | Facility: HOSPITAL | Age: 68
Discharge: HOME | End: 2018-08-24
Payer: COMMERCIAL

## 2018-08-24 DIAGNOSIS — Z12.39 SCREENING BREAST EXAMINATION: ICD-10-CM

## 2018-08-24 PROCEDURE — 77067 SCR MAMMO BI INCL CAD: CPT

## 2019-06-05 ENCOUNTER — EVALUATION (OUTPATIENT)
Dept: PHYSICAL THERAPY | Facility: CLINIC | Age: 69
End: 2019-06-05
Payer: COMMERCIAL

## 2019-06-05 DIAGNOSIS — M70.62 GREATER TROCHANTERIC BURSITIS OF LEFT HIP: ICD-10-CM

## 2019-06-05 DIAGNOSIS — M25.552 LEFT HIP PAIN: Primary | ICD-10-CM

## 2019-06-05 PROCEDURE — 97161 PT EVAL LOW COMPLEX 20 MIN: CPT

## 2019-06-10 ENCOUNTER — OFFICE VISIT (OUTPATIENT)
Dept: PHYSICAL THERAPY | Facility: CLINIC | Age: 69
End: 2019-06-10
Payer: COMMERCIAL

## 2019-06-10 DIAGNOSIS — M70.62 GREATER TROCHANTERIC BURSITIS OF LEFT HIP: ICD-10-CM

## 2019-06-10 DIAGNOSIS — M25.552 LEFT HIP PAIN: Primary | ICD-10-CM

## 2019-06-10 PROCEDURE — 97110 THERAPEUTIC EXERCISES: CPT

## 2019-06-10 PROCEDURE — 97140 MANUAL THERAPY 1/> REGIONS: CPT

## 2019-06-13 ENCOUNTER — OFFICE VISIT (OUTPATIENT)
Dept: PHYSICAL THERAPY | Facility: CLINIC | Age: 69
End: 2019-06-13
Payer: COMMERCIAL

## 2019-06-13 DIAGNOSIS — M70.62 GREATER TROCHANTERIC BURSITIS OF LEFT HIP: ICD-10-CM

## 2019-06-13 DIAGNOSIS — M25.552 LEFT HIP PAIN: Primary | ICD-10-CM

## 2019-06-13 PROCEDURE — 97112 NEUROMUSCULAR REEDUCATION: CPT

## 2019-06-13 PROCEDURE — 97140 MANUAL THERAPY 1/> REGIONS: CPT

## 2019-06-13 PROCEDURE — 97110 THERAPEUTIC EXERCISES: CPT

## 2019-06-17 ENCOUNTER — OFFICE VISIT (OUTPATIENT)
Dept: PHYSICAL THERAPY | Facility: CLINIC | Age: 69
End: 2019-06-17
Payer: COMMERCIAL

## 2019-06-17 DIAGNOSIS — M70.62 GREATER TROCHANTERIC BURSITIS OF LEFT HIP: ICD-10-CM

## 2019-06-17 DIAGNOSIS — M25.552 LEFT HIP PAIN: Primary | ICD-10-CM

## 2019-06-17 PROCEDURE — 97110 THERAPEUTIC EXERCISES: CPT

## 2019-06-17 PROCEDURE — 97140 MANUAL THERAPY 1/> REGIONS: CPT

## 2019-06-17 PROCEDURE — 97112 NEUROMUSCULAR REEDUCATION: CPT

## 2019-06-20 ENCOUNTER — OFFICE VISIT (OUTPATIENT)
Dept: PHYSICAL THERAPY | Facility: CLINIC | Age: 69
End: 2019-06-20
Payer: COMMERCIAL

## 2019-06-20 DIAGNOSIS — M70.62 GREATER TROCHANTERIC BURSITIS OF LEFT HIP: ICD-10-CM

## 2019-06-20 DIAGNOSIS — M25.552 LEFT HIP PAIN: Primary | ICD-10-CM

## 2019-06-20 PROCEDURE — 97112 NEUROMUSCULAR REEDUCATION: CPT

## 2019-06-20 PROCEDURE — 97110 THERAPEUTIC EXERCISES: CPT

## 2019-06-20 PROCEDURE — 97140 MANUAL THERAPY 1/> REGIONS: CPT

## 2019-06-24 ENCOUNTER — OFFICE VISIT (OUTPATIENT)
Dept: PHYSICAL THERAPY | Facility: CLINIC | Age: 69
End: 2019-06-24
Payer: COMMERCIAL

## 2019-06-24 DIAGNOSIS — M70.62 GREATER TROCHANTERIC BURSITIS OF LEFT HIP: ICD-10-CM

## 2019-06-24 DIAGNOSIS — M25.552 LEFT HIP PAIN: Primary | ICD-10-CM

## 2019-06-24 PROCEDURE — 97140 MANUAL THERAPY 1/> REGIONS: CPT

## 2019-06-24 PROCEDURE — 97112 NEUROMUSCULAR REEDUCATION: CPT

## 2019-06-24 PROCEDURE — 97110 THERAPEUTIC EXERCISES: CPT

## 2019-06-27 ENCOUNTER — OFFICE VISIT (OUTPATIENT)
Dept: PHYSICAL THERAPY | Facility: CLINIC | Age: 69
End: 2019-06-27
Payer: COMMERCIAL

## 2019-06-27 DIAGNOSIS — M70.62 GREATER TROCHANTERIC BURSITIS OF LEFT HIP: ICD-10-CM

## 2019-06-27 DIAGNOSIS — M25.552 LEFT HIP PAIN: Primary | ICD-10-CM

## 2019-06-27 PROCEDURE — 97140 MANUAL THERAPY 1/> REGIONS: CPT

## 2019-06-27 PROCEDURE — 97110 THERAPEUTIC EXERCISES: CPT

## 2019-07-23 DIAGNOSIS — E03.9 HYPOTHYROIDISM, UNSPECIFIED TYPE: ICD-10-CM

## 2019-07-23 RX ORDER — LEVOTHYROXINE SODIUM 0.03 MG/1
25 TABLET ORAL DAILY
Qty: 90 TABLET | Refills: 0 | Status: SHIPPED | OUTPATIENT
Start: 2019-07-23 | End: 2019-08-03 | Stop reason: SDUPTHER

## 2019-08-03 DIAGNOSIS — E03.9 HYPOTHYROIDISM, UNSPECIFIED TYPE: ICD-10-CM

## 2019-08-03 RX ORDER — LEVOTHYROXINE SODIUM 0.03 MG/1
25 TABLET ORAL DAILY
Qty: 90 TABLET | Refills: 0 | Status: SHIPPED | OUTPATIENT
Start: 2019-08-03 | End: 2019-10-16 | Stop reason: SDUPTHER

## 2019-08-15 ENCOUNTER — OFFICE VISIT (OUTPATIENT)
Dept: FAMILY MEDICINE CLINIC | Facility: CLINIC | Age: 69
End: 2019-08-15
Payer: COMMERCIAL

## 2019-08-15 VITALS
DIASTOLIC BLOOD PRESSURE: 70 MMHG | HEIGHT: 66 IN | RESPIRATION RATE: 14 BRPM | SYSTOLIC BLOOD PRESSURE: 120 MMHG | BODY MASS INDEX: 24.75 KG/M2 | TEMPERATURE: 99.6 F | HEART RATE: 72 BPM | WEIGHT: 154 LBS

## 2019-08-15 DIAGNOSIS — M85.80 OSTEOPENIA, UNSPECIFIED LOCATION: ICD-10-CM

## 2019-08-15 DIAGNOSIS — E78.5 HYPERLIPIDEMIA, UNSPECIFIED HYPERLIPIDEMIA TYPE: ICD-10-CM

## 2019-08-15 DIAGNOSIS — E03.9 HYPOTHYROIDISM, UNSPECIFIED TYPE: Primary | ICD-10-CM

## 2019-08-15 DIAGNOSIS — Z00.00 MEDICARE ANNUAL WELLNESS VISIT, SUBSEQUENT: ICD-10-CM

## 2019-08-15 PROCEDURE — 99214 OFFICE O/P EST MOD 30 MIN: CPT | Performed by: FAMILY MEDICINE

## 2019-08-15 PROCEDURE — 3008F BODY MASS INDEX DOCD: CPT | Performed by: FAMILY MEDICINE

## 2019-08-15 PROCEDURE — 1170F FXNL STATUS ASSESSED: CPT | Performed by: FAMILY MEDICINE

## 2019-08-15 PROCEDURE — 1160F RVW MEDS BY RX/DR IN RCRD: CPT | Performed by: FAMILY MEDICINE

## 2019-08-15 PROCEDURE — G0439 PPPS, SUBSEQ VISIT: HCPCS | Performed by: FAMILY MEDICINE

## 2019-08-15 PROCEDURE — 1125F AMNT PAIN NOTED PAIN PRSNT: CPT | Performed by: FAMILY MEDICINE

## 2019-08-15 NOTE — PROGRESS NOTES
George Jeffers is here for her Subsequent Wellness visit  Health Risk Assessment:  Patient rates overall health as excellent  Patient feels that their physical health rating is Same  Eyesight was rated as Same  Hearing was rated as Same  Patient feels that their emotional and mental health rating is Same  Pain experienced by patient in the last 7 days has been Some  Patient's pain rating has been 3/10  Patient states that she has experienced no weight loss or gain in last 6 months  Emotional/Mental Health:  Patient has not been feeling nervous/anxious  PHQ-9 Depression Screening:    Frequency of the following problems over the past two weeks:      1  Little interest or pleasure in doing things: 0 - not at all      2  Feeling down, depressed, or hopeless: 0 - not at all  PHQ-2 Score: 0          Broken Bones/Falls: Fall Risk Assessment:    In the past year, patient has experienced: No history of falling in past year          Bladder/Bowel:  Patient has not leaked urine accidently in the last six months  Patient reports no loss of bowel control  Immunizations:  Patient has had a flu vaccination within the last year  Patient has received a pneumonia shot  Patient has received a shingles shot  Patient has not received tetanus/diphtheria shot  Home Safety:  Patient does not have trouble with stairs inside or outside of their home  Patient currently reports that there are no safety hazards present in home, working smoke alarms, working carbon monoxide detectors  Preventative Screenings:   Breast cancer screening performed, 8/24/2018  colon cancer screen completed, 11/27/2017  cholesterol screen completed, 7/23/2018  glaucoma eye exam completed,     Nutrition:  Current diet: Regular and Limited junk food with servings of the following:    Medications:  Patient is currently taking over-the-counter supplements  Patient is able to manage medications      Lifestyle Choices:  Patient reports no tobacco use   Patient has smoked or used tobacco in the past   Patient has stopped her tobacco use  Tobacco use quit date: 1981  Patient reports alcohol use  Alcohol use per week: 3-4 glasses  Patient drives a vehicle  Patient wears seat belt  Activities of Daily Living:  Can get out of bed by his or her self, able to dress self, able to make own meals, able to do own shopping, able to bathe self, can do own laundry/housekeeping, can manage own money, pay bills and track expenses    Previous Hospitalizations:  No hospitalization or ED visit in past 12 months        Advanced Directives:  Patient has decided on a power of   Patient has spoken to designated power of   Patient has completed advanced directive  Preventative Screening/Counseling:      Cardiovascular:      General: Risks and Benefits Discussed      Counseling: Healthy Diet, Improve Cholesterol and Improve Exercise Tolerance     Due for Labs/Analytes/Optional EKG: Lipid Panel          Diabetes:      General: Risks and Benefits Discussed      Counseling: Healthy Diet, Healthy Weight and Improve Physical Activity      Due for labs: Blood Glucose          Colorectal Cancer:      General: Risks and Benefits Discussed      Counseling: high fiber diet          Breast Cancer:      General: Risks and Benefits Discussed          Cervical Cancer:      General: Screening Not Indicated          Osteoporosis:      General: Risks and Benefits Discussed      Counseling: Calcium and Vitamin D Intake and Regular Weightbearing Exercise          AAA:      General: Risks and Benefits Discussed          Glaucoma:      General: Risks and Benefits Discussed and Screening Current          HIV:      General: Screening Not Indicated          Hepatitis C:      General: Screening Current        Advanced Directives:   Patient has living will for healthcare, has durable POA for healthcare, patient has an advanced directive   Information on ACP and/or AD provided  5 wishes given       Immunizations:      Influenza: Risks & Benefits Discussed and Influenza Recommended Annually      Pneumococcal: Risks & Benefits Discussed      Shingrix: Risks & Benefits Discussed      Hepatitis B (Medium to high risk patients): Risks & Benefits Discussed      Zostavax: Risks & Benefits Discussed      TD: Risks & Benefits Discussed and Td Vaccine UTD      TDAP: Risks & Benefits Discussed and Tdap Vaccine UTD      Other Preventative Counseling (Non-Medicare):  Skin self-exam and Sunscreen use

## 2019-08-21 LAB
ALBUMIN SERPL-MCNC: 4.4 G/DL (ref 3.6–4.8)
ALBUMIN/GLOB SERPL: 1.8 {RATIO} (ref 1.2–2.2)
ALP SERPL-CCNC: 67 IU/L (ref 39–117)
ALT SERPL-CCNC: 17 IU/L (ref 0–32)
AMYLASE SERPL-CCNC: 93 U/L (ref 31–124)
AST SERPL-CCNC: 23 IU/L (ref 0–40)
BASOPHILS # BLD AUTO: 0 X10E3/UL (ref 0–0.2)
BASOPHILS NFR BLD AUTO: 1 %
BILIRUB SERPL-MCNC: 0.6 MG/DL (ref 0–1.2)
BUN SERPL-MCNC: 22 MG/DL (ref 8–27)
BUN/CREAT SERPL: 25 (ref 12–28)
CALCIUM SERPL-MCNC: 9.9 MG/DL (ref 8.7–10.3)
CHLORIDE SERPL-SCNC: 101 MMOL/L (ref 96–106)
CHOLEST SERPL-MCNC: 244 MG/DL (ref 100–199)
CO2 SERPL-SCNC: 24 MMOL/L (ref 20–29)
CREAT SERPL-MCNC: 0.88 MG/DL (ref 0.57–1)
EOSINOPHIL # BLD AUTO: 0.3 X10E3/UL (ref 0–0.4)
EOSINOPHIL NFR BLD AUTO: 4 %
ERYTHROCYTE [DISTWIDTH] IN BLOOD BY AUTOMATED COUNT: 13.2 % (ref 12.3–15.4)
GLOBULIN SER-MCNC: 2.4 G/DL (ref 1.5–4.5)
GLUCOSE SERPL-MCNC: 97 MG/DL (ref 65–99)
HCT VFR BLD AUTO: 43 % (ref 34–46.6)
HDLC SERPL-MCNC: 71 MG/DL
HGB BLD-MCNC: 14.5 G/DL (ref 11.1–15.9)
IMM GRANULOCYTES # BLD: 0 X10E3/UL (ref 0–0.1)
IMM GRANULOCYTES NFR BLD: 0 %
LDLC SERPL CALC-MCNC: 149 MG/DL (ref 0–99)
LIPASE SERPL-CCNC: 39 U/L (ref 14–72)
LYMPHOCYTES # BLD AUTO: 2.1 X10E3/UL (ref 0.7–3.1)
LYMPHOCYTES NFR BLD AUTO: 36 %
MAGNESIUM SERPL-MCNC: 2.3 MG/DL (ref 1.6–2.3)
MCH RBC QN AUTO: 32.1 PG (ref 26.6–33)
MCHC RBC AUTO-ENTMCNC: 33.7 G/DL (ref 31.5–35.7)
MCV RBC AUTO: 95 FL (ref 79–97)
MICRODELETION SYND BLD/T FISH: NORMAL
MONOCYTES # BLD AUTO: 0.5 X10E3/UL (ref 0.1–0.9)
MONOCYTES NFR BLD AUTO: 8 %
NEUTROPHILS # BLD AUTO: 3.1 X10E3/UL (ref 1.4–7)
NEUTROPHILS NFR BLD AUTO: 51 %
PLATELET # BLD AUTO: 249 X10E3/UL (ref 150–450)
POTASSIUM SERPL-SCNC: 4.7 MMOL/L (ref 3.5–5.2)
PROT SERPL-MCNC: 6.8 G/DL (ref 6–8.5)
RBC # BLD AUTO: 4.52 X10E6/UL (ref 3.77–5.28)
SL AMB EGFR AFRICAN AMERICAN: 79 ML/MIN/1.73
SL AMB EGFR NON AFRICAN AMERICAN: 68 ML/MIN/1.73
SODIUM SERPL-SCNC: 140 MMOL/L (ref 134–144)
TRIGL SERPL-MCNC: 122 MG/DL (ref 0–149)
TSH SERPL DL<=0.005 MIU/L-ACNC: 3.39 UIU/ML (ref 0.45–4.5)
WBC # BLD AUTO: 6 X10E3/UL (ref 3.4–10.8)

## 2019-08-25 PROBLEM — M85.80 OSTEOPENIA: Status: ACTIVE | Noted: 2019-08-25

## 2019-08-25 NOTE — PROGRESS NOTES
Chief Complaint   Patient presents with    Medicare Wellness Visit     AWV        Patient ID: Marlea Halsted is a 81 y o  female  82 yo pt in for follow-up on chronic conditions  Overall has been doing well  Is due for labs  UTD w eye/dental care  Follows heart healthy diet and exercises on regular basis  Past Medical History:   Diagnosis Date    Endometriosis     last assessed 07/17/2017    MVP (mitral valve prolapse) 1980       Past Surgical History:   Procedure Laterality Date    HYSTERECTOMY  01/17/1993       Patient Active Problem List   Diagnosis    Arthritis    Heart murmur    Hypothyroidism    Mitral valve prolapse    Thyroid cyst    Osteopenia       Family History   Problem Relation Age of Onset    Stroke Mother         CVA    Other Mother         cardiac disorder    Other Father         cardiac disorder       Immunization History   Administered Date(s) Administered    Influenza TIV (IM) 09/21/2010, 11/12/2012, 10/17/2016, 08/30/2017    Pneumococcal Conjugate 13-Valent 07/17/2017    Tdap 12/14/2017    Tuberculin Skin Test-PPD Intradermal 09/21/2010       No Known Allergies    Current Outpatient Medications   Medication Sig Dispense Refill    aspirin 81 mg chewable tablet Chew daily      calcium carbonate 1250 MG capsule Take 1,250 mg by mouth 2 (two) times a day with meals      estradiol (VAGIFEM) 10 MCG TABS vaginal tablet Insert into the vagina      levothyroxine 25 mcg tablet Take 1 tablet (25 mcg total) by mouth daily 90 tablet 0    tretinoin (RETIN-A) 0 025 % cream APPLY AT NIGHT AS DIRECTED  1     No current facility-administered medications for this visit          Social History     Socioeconomic History    Marital status: /Civil Union     Spouse name: None    Number of children: None    Years of education: None    Highest education level: None   Occupational History    None   Social Needs    Financial resource strain: None    Food insecurity: Worry: None     Inability: None    Transportation needs:     Medical: None     Non-medical: None   Tobacco Use    Smoking status: Former Smoker    Smokeless tobacco: Never Used   Substance and Sexual Activity    Alcohol use: Yes     Comment: 4x weekly glass of wine    Drug use: No    Sexual activity: Never   Lifestyle    Physical activity:     Days per week: None     Minutes per session: None    Stress: None   Relationships    Social connections:     Talks on phone: None     Gets together: None     Attends [employer] service: None     Active member of club or organization: None     Attends meetings of clubs or organizations: None     Relationship status: None    Intimate partner violence:     Fear of current or ex partner: None     Emotionally abused: None     Physically abused: None     Forced sexual activity: None   Other Topics Concern    None   Social History Narrative    None       Review of Systems   Constitutional: Negative  Respiratory: Negative  Cardiovascular: Negative  Gastrointestinal: Negative  Endocrine:        Thyroid d/o   Musculoskeletal: Positive for arthralgias  Hematological: Negative  Psychiatric/Behavioral: Negative  Objective:    /70 (BP Location: Left arm, Patient Position: Sitting, Cuff Size: Standard)   Pulse 72   Temp 99 6 °F (37 6 °C) (Tympanic)   Resp 14   Ht 5' 6" (1 676 m)   Wt 69 9 kg (154 lb)   BMI 24 86 kg/m²        Physical Exam   Constitutional: She is oriented to person, place, and time  She appears well-developed and well-nourished  No distress  Eyes: Conjunctivae are normal  No scleral icterus  Neck: Neck supple  Cardiovascular: Normal rate and regular rhythm  Pulmonary/Chest: Effort normal and breath sounds normal  No respiratory distress  Abdominal: Soft  Bowel sounds are normal  There is no tenderness  Musculoskeletal: Normal range of motion  Neurological: She is alert and oriented to person, place, and time   No cranial nerve deficit  Skin: Skin is warm and dry  Psychiatric: She has a normal mood and affect  Nursing note and vitals reviewed  Assessment/Plan:     Diagnoses and all orders for this visit:    Hypothyroidism, unspecified type  Comments:  check tsh--adjust l-thyroxine prn  Orders:  -     Lipid Panel with Direct LDL reflex; Future  -     TSH, 3rd generation; Future    Hyperlipidemia, unspecified hyperlipidemia type  Comments:  check lipid profile, maintain euthyroid state, low chol diet, reg exercise  Orders:  -     Amylase; Future  -     Lipase; Future  -     Lipid Panel with Direct LDL reflex; Future  -     CBC; Future  -     Comprehensive metabolic panel; Future  -     Magnesium; Future    Osteopenia, unspecified location  Comments:  cont reg wgt bearing exercise, daily ca/mg/vit d  Orders:  -     CBC; Future  -     Comprehensive metabolic panel; Future  -     Magnesium; Future    Medicare annual wellness visit, subsequent  Comments:  check if pneum 23 given--if not can administer w flu vacc this Fall          Venecia Crowley MD

## 2019-10-09 DIAGNOSIS — B00.1 RECURRENT COLD SORES: Primary | ICD-10-CM

## 2019-10-09 RX ORDER — VALACYCLOVIR HYDROCHLORIDE 500 MG/1
TABLET, FILM COATED ORAL
Qty: 30 TABLET | Refills: 3 | Status: SHIPPED | OUTPATIENT
Start: 2019-10-09 | End: 2020-10-19

## 2019-10-09 RX ORDER — VALACYCLOVIR HYDROCHLORIDE 500 MG/1
TABLET, FILM COATED ORAL
Qty: 30 TABLET | Refills: 0 | Status: SHIPPED | OUTPATIENT
Start: 2019-10-09 | End: 2019-10-09 | Stop reason: SDUPTHER

## 2019-10-16 DIAGNOSIS — E03.9 HYPOTHYROIDISM, UNSPECIFIED TYPE: ICD-10-CM

## 2019-10-16 RX ORDER — LEVOTHYROXINE SODIUM 0.03 MG/1
TABLET ORAL
Qty: 90 TABLET | Refills: 1 | Status: SHIPPED | OUTPATIENT
Start: 2019-10-16 | End: 2020-05-04

## 2020-05-04 DIAGNOSIS — E03.9 HYPOTHYROIDISM, UNSPECIFIED TYPE: ICD-10-CM

## 2020-05-04 RX ORDER — LEVOTHYROXINE SODIUM 0.03 MG/1
TABLET ORAL
Qty: 90 TABLET | Refills: 1 | Status: SHIPPED | OUTPATIENT
Start: 2020-05-04 | End: 2020-10-08

## 2020-08-17 ENCOUNTER — OFFICE VISIT (OUTPATIENT)
Dept: FAMILY MEDICINE CLINIC | Facility: CLINIC | Age: 70
End: 2020-08-17
Payer: COMMERCIAL

## 2020-08-17 VITALS
TEMPERATURE: 99.1 F | SYSTOLIC BLOOD PRESSURE: 128 MMHG | DIASTOLIC BLOOD PRESSURE: 70 MMHG | HEIGHT: 66 IN | WEIGHT: 153.2 LBS | HEART RATE: 56 BPM | RESPIRATION RATE: 14 BRPM | BODY MASS INDEX: 24.62 KG/M2

## 2020-08-17 DIAGNOSIS — E04.1 THYROID CYST: Primary | ICD-10-CM

## 2020-08-17 DIAGNOSIS — R00.2 PALPITATIONS: ICD-10-CM

## 2020-08-17 DIAGNOSIS — E78.5 HYPERLIPIDEMIA, UNSPECIFIED HYPERLIPIDEMIA TYPE: ICD-10-CM

## 2020-08-17 DIAGNOSIS — Z23 NEED FOR PNEUMOCOCCAL VACCINATION: ICD-10-CM

## 2020-08-17 DIAGNOSIS — M85.80 OSTEOPENIA, UNSPECIFIED LOCATION: ICD-10-CM

## 2020-08-17 DIAGNOSIS — Z00.00 MEDICARE ANNUAL WELLNESS VISIT, SUBSEQUENT: ICD-10-CM

## 2020-08-17 DIAGNOSIS — E03.9 HYPOTHYROIDISM, UNSPECIFIED TYPE: ICD-10-CM

## 2020-08-17 PROCEDURE — G0009 ADMIN PNEUMOCOCCAL VACCINE: HCPCS | Performed by: FAMILY MEDICINE

## 2020-08-17 PROCEDURE — 90732 PPSV23 VACC 2 YRS+ SUBQ/IM: CPT | Performed by: FAMILY MEDICINE

## 2020-08-17 PROCEDURE — 99214 OFFICE O/P EST MOD 30 MIN: CPT | Performed by: FAMILY MEDICINE

## 2020-08-17 PROCEDURE — G0439 PPPS, SUBSEQ VISIT: HCPCS | Performed by: FAMILY MEDICINE

## 2020-08-17 NOTE — PROGRESS NOTES
Assessment and Plan:     Problem List Items Addressed This Visit     None           Preventive health issues were discussed with patient, and age appropriate screening tests were ordered as noted in patient's After Visit Summary  Personalized health advice and appropriate referrals for health education or preventive services given if needed, as noted in patient's After Visit Summary       History of Present Illness:     Patient presents for Medicare Annual Wellness visit    Patient Care Team:  Cathy Vines MD as PCP - Ruben Obando MD     Problem List:     Patient Active Problem List   Diagnosis    Arthritis    Heart murmur    Hypothyroidism    Mitral valve prolapse    Thyroid cyst    Osteopenia      Past Medical and Surgical History:     Past Medical History:   Diagnosis Date    Endometriosis     last assessed 07/17/2017    MVP (mitral valve prolapse) 1980     Past Surgical History:   Procedure Laterality Date    HYSTERECTOMY  01/17/1993      Family History:     Family History   Problem Relation Age of Onset    Stroke Mother         CVA    Other Mother         cardiac disorder    Other Father         cardiac disorder      Social History:        Social History     Socioeconomic History    Marital status: /Civil Union     Spouse name: None    Number of children: None    Years of education: None    Highest education level: None   Occupational History    None   Social Needs    Financial resource strain: None    Food insecurity     Worry: None     Inability: None    Transportation needs     Medical: None     Non-medical: None   Tobacco Use    Smoking status: Former Smoker    Smokeless tobacco: Never Used   Substance and Sexual Activity    Alcohol use: Yes     Comment: 4x weekly glass of wine    Drug use: No    Sexual activity: Never   Lifestyle    Physical activity     Days per week: None     Minutes per session: None    Stress: None   Relationships    Social connections Talks on phone: None     Gets together: None     Attends Voodoo service: None     Active member of club or organization: None     Attends meetings of clubs or organizations: None     Relationship status: None    Intimate partner violence     Fear of current or ex partner: None     Emotionally abused: None     Physically abused: None     Forced sexual activity: None   Other Topics Concern    None   Social History Narrative    None      Medications and Allergies:     Current Outpatient Medications   Medication Sig Dispense Refill    aspirin 81 mg chewable tablet Chew daily      calcium carbonate 1250 MG capsule Take 1,250 mg by mouth 2 (two) times a day with meals      estradiol (VAGIFEM) 10 MCG TABS vaginal tablet Insert into the vagina      levothyroxine 25 mcg tablet TAKE 1 TABLET DAILY 90 tablet 1    tretinoin (RETIN-A) 0 025 % cream APPLY AT NIGHT AS DIRECTED  1    valACYclovir (VALTREX) 500 mg tablet Use as directed prn 30 tablet 3     No current facility-administered medications for this visit  No Known Allergies   Immunizations:     Immunization History   Administered Date(s) Administered    Influenza TIV (IM) 09/21/2010, 11/12/2012, 10/17/2016, 08/30/2017    Pneumococcal Conjugate 13-Valent 07/17/2017    Tdap 12/14/2017    Tuberculin Skin Test-PPD Intradermal 09/21/2010      Health Maintenance:         Topic Date Due    MAMMOGRAM  08/23/2020    Hepatitis C Screening  Completed         Topic Date Due    Pneumococcal Vaccine: 65+ Years (2 of 2 - PPSV23) 07/17/2018    Influenza Vaccine  07/01/2020      Medicare Health Risk Assessment:     There were no vitals taken for this visit  Zoraida Delacruz is here for her Subsequent Wellness visit  Last Medicare Wellness visit information reviewed, patient interviewed and updates made to the record today  Health Risk Assessment:   Patient rates overall health as very good  Patient feels that their physical health rating is same   Eyesight was rated as same  Hearing was rated as same  Patient feels that their emotional and mental health rating is same  Pain experienced in the last 7 days has been some  Patient's pain rating has been 2/10  Patient states that she has experienced no weight loss or gain in last 6 months  Fall Risk Screening: In the past year, patient has experienced: no history of falling in past year      Urinary Incontinence Screening:   Patient has not leaked urine accidently in the last six months  Home Safety:  Patient does not have trouble with stairs inside or outside of their home  Patient has working smoke alarms and has working carbon monoxide detector  Home safety hazards include: none  Nutrition:   Current diet is Regular  Medications:   Patient is currently taking over-the-counter supplements  OTC medications include: Recently added Collagen  Patient is able to manage medications  Activities of Daily Living (ADLs)/Instrumental Activities of Daily Living (IADLs):   Walk and transfer into and out of bed and chair?: Yes  Dress and groom yourself?: Yes    Bathe or shower yourself?: Yes    Feed yourself? Yes  Do your laundry/housekeeping?: Yes  Manage your money, pay your bills and track your expenses?: Yes  Make your own meals?: Yes    Do your own shopping?: Yes    Previous Hospitalizations:   Any hospitalizations or ED visits within the last 12 months?: No      Advance Care Planning:   Living will: Yes    Durable POA for healthcare:  Yes    Advanced directive: Yes    Advanced directive counseling given: No      Comments: Pt has completed ACP    PREVENTIVE SCREENINGS      Cardiovascular Screening:    General: Risks and Benefits Discussed and Screening Current      Diabetes Screening:     General: Risks and Benefits Discussed and Screening Current      Colorectal Cancer Screening:     General: Screening Current      Breast Cancer Screening:     General: Screening Current      Cervical Cancer Screening: General: Screening Not Indicated      Osteoporosis Screening:    General: Risks and Benefits Discussed      Abdominal Aortic Aneurysm (AAA) Screening:        General: Screening Not Indicated      Lung Cancer Screening:     General: Screening Not Indicated      Hepatitis C Screening:    General: Screening Current    Hep C Screening Accepted: Yes      Other Counseling Topics:   Car/seat belt/driving safety, skin self-exam and regular weightbearing exercise and calcium and vitamin D intake         Tavia Be MD

## 2020-08-17 NOTE — PATIENT INSTRUCTIONS
Medicare Preventive Visit Patient Instructions  Thank you for completing your Welcome to Medicare Visit or Medicare Annual Wellness Visit today  Your next wellness visit will be due in one year (8/17/2021)  The screening/preventive services that you may require over the next 5-10 years are detailed below  Some tests may not apply to you based off risk factors and/or age  Screening tests ordered at today's visit but not completed yet may show as past due  Also, please note that scanned in results may not display below  Preventive Screenings:  Service Recommendations Previous Testing/Comments   Colorectal Cancer Screening  * Colonoscopy    * Fecal Occult Blood Test (FOBT)/Fecal Immunochemical Test (FIT)  * Fecal DNA/Cologuard Test  * Flexible Sigmoidoscopy Age: 54-65 years old   Colonoscopy: every 10 years (may be performed more frequently if at higher risk)  OR  FOBT/FIT: every 1 year  OR  Cologuard: every 3 years  OR  Sigmoidoscopy: every 5 years  Screening may be recommended earlier than age 48 if at higher risk for colorectal cancer  Also, an individualized decision between you and your healthcare provider will decide whether screening between the ages of 74-80 would be appropriate  Colonoscopy: 11/27/2017  FOBT/FIT: Not on file  Cologuard: Not on file  Sigmoidoscopy: Not on file    Screening Current     Breast Cancer Screening Age: 36 years old  Frequency: every 1-2 years  Not required if history of left and right mastectomy Mammogram: 08/23/2019    Screening Current   Cervical Cancer Screening Between the ages of 21-29, pap smear recommended once every 3 years  Between the ages of 33-67, can perform pap smear with HPV co-testing every 5 years     Recommendations may differ for women with a history of total hysterectomy, cervical cancer, or abnormal pap smears in past  Pap Smear: Not on file    Screening Not Indicated   Hepatitis C Screening Once for adults born between 1945 and 1965  More frequently in patients at high risk for Hepatitis C Hep C Antibody: 07/23/2018    Screening Current   Diabetes Screening 1-2 times per year if you're at risk for diabetes or have pre-diabetes Fasting glucose: No results in last 5 years   A1C: 5 4 %    Screening Current   Cholesterol Screening Once every 5 years if you don't have a lipid disorder  May order more often based on risk factors  Lipid panel: 08/20/2019    Screening Current     Other Preventive Screenings Covered by Medicare:  1  Abdominal Aortic Aneurysm (AAA) Screening: covered once if your at risk  You're considered to be at risk if you have a family history of AAA  2  Lung Cancer Screening: covers low dose CT scan once per year if you meet all of the following conditions: (1) Age 50-69; (2) No signs or symptoms of lung cancer; (3) Current smoker or have quit smoking within the last 15 years; (4) You have a tobacco smoking history of at least 30 pack years (packs per day multiplied by number of years you smoked); (5) You get a written order from a healthcare provider  3  Glaucoma Screening: covered annually if you're considered high risk: (1) You have diabetes OR (2) Family history of glaucoma OR (3)  aged 48 and older OR (3)  American aged 72 and older  3  Osteoporosis Screening: covered every 2 years if you meet one of the following conditions: (1) You're estrogen deficient and at risk for osteoporosis based off medical history and other findings; (2) Have a vertebral abnormality; (3) On glucocorticoid therapy for more than 3 months; (4) Have primary hyperparathyroidism; (5) On osteoporosis medications and need to assess response to drug therapy  · Last bone density test (DXA Scan): 08/23/2017  5  HIV Screening: covered annually if you're between the age of 12-76  Also covered annually if you are younger than 13 and older than 72 with risk factors for HIV infection   For pregnant patients, it is covered up to 3 times per pregnancy  Immunizations:  Immunization Recommendations   Influenza Vaccine Annual influenza vaccination during flu season is recommended for all persons aged >= 6 months who do not have contraindications   Pneumococcal Vaccine (Prevnar and Pneumovax)  * Prevnar = PCV13  * Pneumovax = PPSV23   Adults 25-60 years old: 1-3 doses may be recommended based on certain risk factors  Adults 72 years old: Prevnar (PCV13) vaccine recommended followed by Pneumovax (PPSV23) vaccine  If already received PPSV23 since turning 65, then PCV13 recommended at least one year after PPSV23 dose  Hepatitis B Vaccine 3 dose series if at intermediate or high risk (ex: diabetes, end stage renal disease, liver disease)   Tetanus (Td) Vaccine - COST NOT COVERED BY MEDICARE PART B Following completion of primary series, a booster dose should be given every 10 years to maintain immunity against tetanus  Td may also be given as tetanus wound prophylaxis  Tdap Vaccine - COST NOT COVERED BY MEDICARE PART B Recommended at least once for all adults  For pregnant patients, recommended with each pregnancy  Shingles Vaccine (Shingrix) - COST NOT COVERED BY MEDICARE PART B  2 shot series recommended in those aged 48 and above     Health Maintenance Due:      Topic Date Due    MAMMOGRAM  08/23/2020    Hepatitis C Screening  Completed     Immunizations Due:      Topic Date Due    Pneumococcal Vaccine: 65+ Years (2 of 2 - PPSV23) 07/17/2018    Influenza Vaccine  07/01/2020     Advance Directives   What are advance directives? Advance directives are legal documents that state your wishes and plans for medical care  These plans are made ahead of time in case you lose your ability to make decisions for yourself  Advance directives can apply to any medical decision, such as the treatments you want, and if you want to donate organs  What are the types of advance directives?   There are many types of advance directives, and each state has rules about how to use them  You may choose a combination of any of the following:  · Living will: This is a written record of the treatment you want  You can also choose which treatments you do not want, which to limit, and which to stop at a certain time  This includes surgery, medicine, IV fluid, and tube feedings  · Durable power of  for healthcare [facility]): This is a written record that states who you want to make healthcare choices for you when you are unable to make them for yourself  This person, called a proxy, is usually a family member or a friend  You may choose more than 1 proxy  · Do not resuscitate (DNR) order:  A DNR order is used in case your heart stops beating or you stop breathing  It is a request not to have certain forms of treatment, such as CPR  A DNR order may be included in other types of advance directives  · Medical directive: This covers the care that you want if you are in a coma, near death, or unable to make decisions for yourself  You can list the treatments you want for each condition  Treatment may include pain medicine, surgery, blood transfusions, dialysis, IV or tube feedings, and a ventilator (breathing machine)  · Values history: This document has questions about your views, beliefs, and how you feel and think about life  This information can help others choose the care that you would choose  Why are advance directives important? An advance directive helps you control your care  Although spoken wishes may be used, it is better to have your wishes written down  Spoken wishes can be misunderstood, or not followed  Treatments may be given even if you do not want them  An advance directive may make it easier for your family to make difficult choices about your care  © Copyright [employer] 2018 Information is for End User's use only and may not be sold, redistributed or otherwise used for commercial purposes   All illustrations and images included in [employer] are the copyrighted property of A D A M [employer]  or [address] [place], [place] [address]

## 2020-08-24 LAB
ALBUMIN SERPL-MCNC: 4.3 G/DL (ref 3.8–4.8)
ALBUMIN/GLOB SERPL: 2 {RATIO} (ref 1.2–2.2)
ALP SERPL-CCNC: 74 IU/L (ref 39–117)
ALT SERPL-CCNC: 18 IU/L (ref 0–32)
AMYLASE SERPL-CCNC: 90 U/L (ref 31–110)
AST SERPL-CCNC: 23 IU/L (ref 0–40)
BASOPHILS # BLD AUTO: 0 X10E3/UL (ref 0–0.2)
BASOPHILS NFR BLD AUTO: 0 %
BILIRUB SERPL-MCNC: 0.4 MG/DL (ref 0–1.2)
BUN SERPL-MCNC: 20 MG/DL (ref 8–27)
BUN/CREAT SERPL: 23 (ref 12–28)
CALCIUM SERPL-MCNC: 10.3 MG/DL (ref 8.7–10.3)
CHLORIDE SERPL-SCNC: 103 MMOL/L (ref 96–106)
CHOLEST SERPL-MCNC: 262 MG/DL (ref 100–199)
CO2 SERPL-SCNC: 24 MMOL/L (ref 20–29)
CREAT SERPL-MCNC: 0.87 MG/DL (ref 0.57–1)
EOSINOPHIL # BLD AUTO: 0.2 X10E3/UL (ref 0–0.4)
EOSINOPHIL NFR BLD AUTO: 3 %
ERYTHROCYTE [DISTWIDTH] IN BLOOD BY AUTOMATED COUNT: 12.5 % (ref 11.7–15.4)
GLOBULIN SER-MCNC: 2.1 G/DL (ref 1.5–4.5)
GLUCOSE SERPL-MCNC: 96 MG/DL (ref 65–99)
HCT VFR BLD AUTO: 42.2 % (ref 34–46.6)
HDLC SERPL-MCNC: 79 MG/DL
HGB BLD-MCNC: 14.1 G/DL (ref 11.1–15.9)
IMM GRANULOCYTES # BLD: 0 X10E3/UL (ref 0–0.1)
IMM GRANULOCYTES NFR BLD: 0 %
LDLC SERPL CALC-MCNC: 152 MG/DL (ref 0–99)
LIPASE SERPL-CCNC: 39 U/L (ref 14–72)
LYMPHOCYTES # BLD AUTO: 2.5 X10E3/UL (ref 0.7–3.1)
LYMPHOCYTES NFR BLD AUTO: 35 %
MAGNESIUM SERPL-MCNC: 2.2 MG/DL (ref 1.6–2.3)
MCH RBC QN AUTO: 31.9 PG (ref 26.6–33)
MCHC RBC AUTO-ENTMCNC: 33.4 G/DL (ref 31.5–35.7)
MCV RBC AUTO: 96 FL (ref 79–97)
MICRODELETION SYND BLD/T FISH: NORMAL
MONOCYTES # BLD AUTO: 0.5 X10E3/UL (ref 0.1–0.9)
MONOCYTES NFR BLD AUTO: 7 %
NEUTROPHILS # BLD AUTO: 3.8 X10E3/UL (ref 1.4–7)
NEUTROPHILS NFR BLD AUTO: 55 %
PLATELET # BLD AUTO: 205 X10E3/UL (ref 150–450)
POTASSIUM SERPL-SCNC: 4.9 MMOL/L (ref 3.5–5.2)
PROT SERPL-MCNC: 6.4 G/DL (ref 6–8.5)
RBC # BLD AUTO: 4.42 X10E6/UL (ref 3.77–5.28)
SL AMB EGFR AFRICAN AMERICAN: 79 ML/MIN/1.73
SL AMB EGFR NON AFRICAN AMERICAN: 69 ML/MIN/1.73
SODIUM SERPL-SCNC: 140 MMOL/L (ref 134–144)
TRIGL SERPL-MCNC: 157 MG/DL (ref 0–149)
TSH SERPL DL<=0.005 MIU/L-ACNC: 4.45 UIU/ML (ref 0.45–4.5)
WBC # BLD AUTO: 7 X10E3/UL (ref 3.4–10.8)

## 2020-08-25 ENCOUNTER — TELEPHONE (OUTPATIENT)
Dept: FAMILY MEDICINE CLINIC | Facility: CLINIC | Age: 70
End: 2020-08-25

## 2020-08-25 NOTE — TELEPHONE ENCOUNTER
----- Message from Clifton Morales MD sent at 8/25/2020 12:47 PM EDT -----  Please schedule follow-up appt to review labs-can be virtual if pt prefers as pt recently in-office--thanks

## 2020-08-31 ENCOUNTER — TELEMEDICINE (OUTPATIENT)
Dept: FAMILY MEDICINE CLINIC | Facility: CLINIC | Age: 70
End: 2020-08-31
Payer: COMMERCIAL

## 2020-08-31 VITALS — BODY MASS INDEX: 24.68 KG/M2 | WEIGHT: 153.6 LBS | HEIGHT: 66 IN

## 2020-08-31 DIAGNOSIS — E78.5 HYPERLIPIDEMIA, UNSPECIFIED HYPERLIPIDEMIA TYPE: Primary | ICD-10-CM

## 2020-08-31 DIAGNOSIS — E03.9 HYPOTHYROIDISM, UNSPECIFIED TYPE: ICD-10-CM

## 2020-08-31 PROCEDURE — 99213 OFFICE O/P EST LOW 20 MIN: CPT | Performed by: FAMILY MEDICINE

## 2020-08-31 RX ORDER — ROSUVASTATIN CALCIUM 5 MG/1
5 TABLET, COATED ORAL DAILY
Qty: 90 TABLET | Refills: 1 | Status: SHIPPED | OUTPATIENT
Start: 2020-08-31 | End: 2020-12-28

## 2020-09-07 PROBLEM — Z23 NEED FOR PNEUMOCOCCAL VACCINATION: Status: ACTIVE | Noted: 2020-09-07

## 2020-09-07 PROBLEM — R00.2 PALPITATIONS: Status: ACTIVE | Noted: 2020-09-07

## 2020-09-07 PROBLEM — E78.5 HYPERLIPIDEMIA: Status: ACTIVE | Noted: 2020-09-07

## 2020-09-07 PROBLEM — Z00.00 MEDICARE ANNUAL WELLNESS VISIT, SUBSEQUENT: Status: ACTIVE | Noted: 2020-09-07

## 2020-09-07 PROCEDURE — 93000 ELECTROCARDIOGRAM COMPLETE: CPT | Performed by: FAMILY MEDICINE

## 2020-10-08 DIAGNOSIS — E03.9 HYPOTHYROIDISM, UNSPECIFIED TYPE: ICD-10-CM

## 2020-10-08 RX ORDER — LEVOTHYROXINE SODIUM 0.03 MG/1
TABLET ORAL
Qty: 90 TABLET | Refills: 1 | Status: SHIPPED | OUTPATIENT
Start: 2020-10-08 | End: 2021-04-16

## 2020-10-19 DIAGNOSIS — B00.1 RECURRENT COLD SORES: ICD-10-CM

## 2020-10-19 RX ORDER — VALACYCLOVIR HYDROCHLORIDE 500 MG/1
TABLET, FILM COATED ORAL
Qty: 30 TABLET | Refills: 3 | Status: SHIPPED | OUTPATIENT
Start: 2020-10-19 | End: 2021-10-29

## 2020-12-28 DIAGNOSIS — E78.5 HYPERLIPIDEMIA, UNSPECIFIED HYPERLIPIDEMIA TYPE: ICD-10-CM

## 2020-12-28 RX ORDER — ROSUVASTATIN CALCIUM 5 MG/1
TABLET, COATED ORAL
Qty: 90 TABLET | Refills: 1 | Status: SHIPPED | OUTPATIENT
Start: 2020-12-28 | End: 2021-08-13

## 2021-01-20 DIAGNOSIS — Z23 ENCOUNTER FOR IMMUNIZATION: ICD-10-CM

## 2021-01-21 ENCOUNTER — IMMUNIZATIONS (OUTPATIENT)
Dept: FAMILY MEDICINE CLINIC | Facility: HOSPITAL | Age: 71
End: 2021-01-21

## 2021-01-21 DIAGNOSIS — Z23 ENCOUNTER FOR IMMUNIZATION: Primary | ICD-10-CM

## 2021-01-21 PROCEDURE — 91301 SARS-COV-2 / COVID-19 MRNA VACCINE (MODERNA) 100 MCG: CPT

## 2021-01-21 PROCEDURE — 0011A SARS-COV-2 / COVID-19 MRNA VACCINE (MODERNA) 100 MCG: CPT

## 2021-04-16 DIAGNOSIS — E03.9 HYPOTHYROIDISM, UNSPECIFIED TYPE: ICD-10-CM

## 2021-04-16 RX ORDER — LEVOTHYROXINE SODIUM 0.03 MG/1
TABLET ORAL
Qty: 90 TABLET | Refills: 1 | Status: SHIPPED | OUTPATIENT
Start: 2021-04-16 | End: 2021-09-15 | Stop reason: DRUGHIGH

## 2021-08-16 ENCOUNTER — RA CDI HCC (OUTPATIENT)
Dept: OTHER | Facility: HOSPITAL | Age: 71
End: 2021-08-16

## 2021-08-16 NOTE — PROGRESS NOTES
[facility]  coding opportunities       Chart reviewed, no opportunity found: CHART REVIEWED, NO OPPORTUNITY FOUND                        Patients insurance company: [facility] [address]  (Medicare Advantage and Commercial)

## 2021-08-20 ENCOUNTER — OFFICE VISIT (OUTPATIENT)
Dept: FAMILY MEDICINE CLINIC | Facility: CLINIC | Age: 71
End: 2021-08-20
Payer: COMMERCIAL

## 2021-08-20 VITALS
RESPIRATION RATE: 14 BRPM | TEMPERATURE: 98.3 F | DIASTOLIC BLOOD PRESSURE: 76 MMHG | BODY MASS INDEX: 24.43 KG/M2 | WEIGHT: 152 LBS | HEIGHT: 66 IN | SYSTOLIC BLOOD PRESSURE: 110 MMHG

## 2021-08-20 DIAGNOSIS — Z00.00 MEDICARE ANNUAL WELLNESS VISIT, SUBSEQUENT: ICD-10-CM

## 2021-08-20 DIAGNOSIS — R00.2 PALPITATIONS: ICD-10-CM

## 2021-08-20 DIAGNOSIS — Z12.31 ENCOUNTER FOR SCREENING MAMMOGRAM FOR MALIGNANT NEOPLASM OF BREAST: ICD-10-CM

## 2021-08-20 DIAGNOSIS — Z78.0 POSTMENOPAUSAL: ICD-10-CM

## 2021-08-20 DIAGNOSIS — E04.1 THYROID CYST: ICD-10-CM

## 2021-08-20 DIAGNOSIS — E03.9 HYPOTHYROIDISM, UNSPECIFIED TYPE: Primary | ICD-10-CM

## 2021-08-20 DIAGNOSIS — M85.80 OSTEOPENIA, UNSPECIFIED LOCATION: ICD-10-CM

## 2021-08-20 DIAGNOSIS — E78.2 MIXED HYPERLIPIDEMIA: ICD-10-CM

## 2021-08-20 PROCEDURE — 1125F AMNT PAIN NOTED PAIN PRSNT: CPT | Performed by: FAMILY MEDICINE

## 2021-08-20 PROCEDURE — 1160F RVW MEDS BY RX/DR IN RCRD: CPT | Performed by: FAMILY MEDICINE

## 2021-08-20 PROCEDURE — 1170F FXNL STATUS ASSESSED: CPT | Performed by: FAMILY MEDICINE

## 2021-08-20 PROCEDURE — 3008F BODY MASS INDEX DOCD: CPT | Performed by: FAMILY MEDICINE

## 2021-08-20 PROCEDURE — 3725F SCREEN DEPRESSION PERFORMED: CPT | Performed by: FAMILY MEDICINE

## 2021-08-20 PROCEDURE — 1036F TOBACCO NON-USER: CPT | Performed by: FAMILY MEDICINE

## 2021-08-20 PROCEDURE — G0439 PPPS, SUBSEQ VISIT: HCPCS | Performed by: FAMILY MEDICINE

## 2021-08-20 PROCEDURE — 99213 OFFICE O/P EST LOW 20 MIN: CPT | Performed by: FAMILY MEDICINE

## 2021-08-20 PROCEDURE — 3288F FALL RISK ASSESSMENT DOCD: CPT | Performed by: FAMILY MEDICINE

## 2021-08-20 PROCEDURE — 1101F PT FALLS ASSESS-DOCD LE1/YR: CPT | Performed by: FAMILY MEDICINE

## 2021-08-22 PROCEDURE — 3288F FALL RISK ASSESSMENT DOCD: CPT | Performed by: FAMILY MEDICINE

## 2021-08-22 NOTE — PROGRESS NOTES
Assessment and Plan:     Problem List Items Addressed This Visit     Hypothyroidism - Primary    Relevant Orders    CBC    TSH, 3rd generation    Thyroid cyst    Osteopenia    Relevant Orders    DXA bone density spine hip and pelvis    CBC    Hyperlipidemia    Relevant Orders    Amylase    Lipase    Lipid Panel with Direct LDL reflex    Comprehensive metabolic panel    Palpitations    Relevant Orders    CBC    Comprehensive metabolic panel    Magnesium    TSH, 3rd generation    UA (URINE) with reflex to Scope    Encounter for screening mammogram for malignant neoplasm of breast    Relevant Orders    Mammo screening bilateral w 3d & cad    Postmenopausal    Relevant Orders    DXA bone density spine hip and pelvis           Preventive health issues were discussed with patient, and age appropriate screening tests were ordered as noted in patient's After Visit Summary  Personalized health advice and appropriate referrals for health education or preventive services given if needed, as noted in patient's After Visit Summary       History of Present Illness:     Patient presents for Medicare Annual Wellness visit    Patient Care Team:  Betzaida Hercules MD as PCP - Jaleel Peck MD     Problem List:     Patient Active Problem List   Diagnosis    Arthritis    Heart murmur    Hypothyroidism    Mitral valve prolapse    Thyroid cyst    Osteopenia    Hyperlipidemia    Palpitations    Need for pneumococcal vaccination    Encounter for screening mammogram for malignant neoplasm of breast    Postmenopausal      Past Medical and Surgical History:     Past Medical History:   Diagnosis Date    Endometriosis     last assessed 07/17/2017    MVP (mitral valve prolapse) 1980     Past Surgical History:   Procedure Laterality Date    HYSTERECTOMY  01/17/1993      Family History:     Family History   Problem Relation Age of Onset    Stroke Mother         CVA    Other Mother         cardiac disorder    Other Father cardiac disorder      Social History:     Social History     Socioeconomic History    Marital status: /Civil Union     Spouse name: None    Number of children: None    Years of education: None    Highest education level: None   Occupational History    None   Tobacco Use    Smoking status: Former Smoker    Smokeless tobacco: Never Used   Vaping Use    Vaping Use: Never used   Substance and Sexual Activity    Alcohol use: Yes     Comment: 4x weekly glass of wine    Drug use: No    Sexual activity: Never   Other Topics Concern    None   Social History Narrative    None     Social Determinants of Health     Financial Resource Strain:     Difficulty of Paying Living Expenses:    Food Insecurity:     Worried About Running Out of Food in the Last Year:     Ran Out of Food in the Last Year:    Transportation Needs:     Lack of Transportation (Medical):      Lack of Transportation (Non-Medical):    Physical Activity:     Days of Exercise per Week:     Minutes of Exercise per Session:    Stress:     Feeling of Stress :    Social Connections:     Frequency of Communication with Friends and Family:     Frequency of Social Gatherings with Friends and Family:     Attends Orthodoxy Services:     Active Member of Clubs or Organizations:     Attends Club or Organization Meetings:     Marital Status:    Intimate Partner Violence:     Fear of Current or Ex-Partner:     Emotionally Abused:     Physically Abused:     Sexually Abused:       Medications and Allergies:     Current Outpatient Medications   Medication Sig Dispense Refill    aspirin 81 mg chewable tablet Chew daily      calcium carbonate 1250 MG capsule Take 1,250 mg by mouth 2 (two) times a day with meals      estradiol (VAGIFEM, YUVAFEM) 10 MCG TABS vaginal tablet Insert 1 tablet into the vagina      levothyroxine 25 mcg tablet TAKE 1 TABLET DAILY 90 tablet 1    metoprolol succinate (TOPROL-XL) 25 mg 24 hr tablet Take 25 mg by mouth daily      Multiple Vitamin (MULTIVITAMIN ADULT PO) Take 1 capsule by mouth      omega-3-acid ethyl esters (Lovaza) 1 g capsule Take 2 g by mouth      rosuvastatin (CRESTOR) 5 mg tablet TAKE 1 TABLET DAILY 30 tablet 0    tretinoin (RETIN-A) 0 025 % cream APPLY AT NIGHT AS DIRECTED  1    calcium carbonate (OS-TWAN) 600 MG tablet Take 1 tablet by mouth (Patient not taking: Reported on 8/20/2021)      estradiol (VAGIFEM) 10 MCG TABS vaginal tablet Insert into the vagina      valACYclovir (VALTREX) 500 mg tablet TAKE AS NEEDED AS DIRECTED 30 tablet 3     No current facility-administered medications for this visit  No Known Allergies   Immunizations:     Immunization History   Administered Date(s) Administered    Influenza, high dose seasonal 0 7 mL 09/16/2020    Influenza, seasonal, injectable 09/21/2010, 11/12/2012, 10/17/2016, 08/30/2017    Pneumococcal Conjugate 13-Valent 07/17/2017    Pneumococcal Polysaccharide PPV23 08/17/2020    SARS-CoV-2 / COVID-19 mRNA IM (Moderna) 01/21/2021, 03/02/2021    Tdap 12/14/2017    Tuberculin Skin Test-PPD Intradermal 09/21/2010      Health Maintenance:         Topic Date Due    Breast Cancer Screening: Mammogram  09/11/2021    Colorectal Cancer Screening  11/27/2027    Hepatitis C Screening  Completed         Topic Date Due    Influenza Vaccine (1) 09/01/2021      Medicare Health Risk Assessment:     /76 (BP Location: Left arm, Patient Position: Sitting, Cuff Size: Standard)   Temp 98 3 °F (36 8 °C) (Temporal)   Resp 14   Ht 5' 6" (1 676 m)   Wt 68 9 kg (152 lb)   BMI 24 53 kg/m²      George Jeffers is here for her Subsequent Wellness visit  Last Medicare Wellness visit information reviewed, patient interviewed and updates made to the record today  Health Risk Assessment:   Patient rates overall health as very good  Patient feels that their physical health rating is same  Patient is very satisfied with their life   Eyesight was rated as slightly worse  Hearing was rated as same  Patient feels that their emotional and mental health rating is same  Patients states they are never, rarely angry  Patient states they are sometimes unusually tired/fatigued  Pain experienced in the last 7 days has been some  Patient's pain rating has been 2/10  Patient states that she has experienced no weight loss or gain in last 6 months  Depression Screening:   PHQ-2 Score: 0      Fall Risk Screening: In the past year, patient has experienced: no history of falling in past year      Urinary Incontinence Screening:   Patient has not leaked urine accidently in the last six months  Home Safety:  Patient does not have trouble with stairs inside or outside of their home  Patient has working smoke alarms and has working carbon monoxide detector  Home safety hazards include: none  Nutrition:   Current diet is Regular  Medications:   Patient is currently taking over-the-counter supplements  OTC medications include: Collagen  Patient is able to manage medications  Activities of Daily Living (ADLs)/Instrumental Activities of Daily Living (IADLs):   Walk and transfer into and out of bed and chair?: Yes  Dress and groom yourself?: Yes    Bathe or shower yourself?: Yes    Feed yourself?  Yes  Do your laundry/housekeeping?: Yes  Manage your money, pay your bills and track your expenses?: Yes  Make your own meals?: Yes    Do your own shopping?: Yes    Previous Hospitalizations:   Any hospitalizations or ED visits within the last 12 months?: No      Advance Care Planning:   Living will: Yes    Durable POA for healthcare: No    Advanced directive: Yes    Advanced directive counseling given: Yes    Five wishes given: Yes      Cognitive Screening:   Provider or family/friend/caregiver concerned regarding cognition?: No    PREVENTIVE SCREENINGS      Cardiovascular Screening:    General: Screening Not Indicated and History Lipid Disorder      Diabetes Screening: General: Screening Current      Colorectal Cancer Screening:     General: Screening Current      Breast Cancer Screening:     General: Screening Current      Cervical Cancer Screening:    General: Screening Not Indicated      Abdominal Aortic Aneurysm (AAA) Screening:        General: Screening Not Indicated      Lung Cancer Screening:     General: Screening Not Indicated      Hepatitis C Screening:    General: Screening Current    Hep C Screening Accepted: Yes      Screening, Brief Intervention, and Referral to Treatment (SBIRT)    Screening  Typical number of drinks in a day: 1  Typical number of drinks in a week: 5  Interpretation: Low risk drinking behavior  AUDIT-C Screenin) How often did you have a drink containing alcohol in the past year? 4 or more times a week  2) How many drinks did you have on a typical day when you were drinking in the past year? 1 to 2  3) How often did you have 6 or more drinks on one occasion in the past year? never    AUDIT-C Score: 4  Interpretation: Score 3-12 (female): POSITIVE screen for alcohol misuse    AUDIT Screenin) How often during the last year have you found that you were not able to stop drinking once you had started? 0 - never  5) How often during the last year have you failed to do what was normally expected from you because of drinking? 0 - never  6) How often during the last year have you needed a first drink in the morning to get yourself going after a heavy drinking session?  0 - never  7) How often during the last year have you had a feeling of guilt or remorse after drinking? 0 - never  8) How often during the last year have you been unable to remember what happened the night before because you had been drinking? 0 - never  9) Have you or someone else been injured as a result of your drinking? 0 - no  10) Has a relative or friend or a doctor or another health worker been concerned about your drinking or suggested you cut down? 0 - no    AUDIT Score: 4  Interpretation: Low risk alcohol consumption    Single Item Drug Screening:  How often have you used an illegal drug (including marijuana) or a prescription medication for non-medical reasons in the past year? never    Single Item Drug Screen Score: 0  Interpretation: Negative screen for possible drug use disorder    Other Counseling Topics:   Car/seat belt/driving safety, skin self-exam, sunscreen and regular weightbearing exercise and calcium and vitamin D intake         Caroline Cooper for HPI/ROS submitted by the patient on 8/19/2021  How often during the last year have you found that you were not able to stop drinking once you had started?: 0 - never  How often during the last year have you failed to do what was normally expected from you because of drinking?: 0 - never  How often during the last year have you needed a first drink in the morning to get yourself going after a heavy drinking session?: 0 - never  How often during the last year have you had a feeling of guilt or remorse after drinking?: 0 - never  How often during the last year have you been unable to remember what happened the night before because you had been drinking?: 0 - never  Have you or someone else been injured as a result of your drinking?: 0 - no  Has a relative or friend or a doctor or another health worker been concerned about your drinking or suggested you cut down?: 0 - no  How would you rate your overall health?: very good  Compared to last year, how is your physical health?: same  In general, how satisfied are you with your life?: very satisfied  Compared to last year, how is your eyesight?: slightly worse  Compared to last year, how is your hearing?: same  Compared to last year, how is your emotional/mental health?: same  How often is anger a problem for you?: never, rarely  How often do you feel unusually tired/fatigued?: sometimes  In the past 7 days, how much pain have you experienced?: some  If you answered "some" or "a lot", please rate the severity of your pain on a scale of 1 to 10 (1 being the least severe pain and 10 being the most intense pain)  : 2/10  In the past 6 months, have you lost or gained 10 pounds without trying?: No  One or more falls in the last year: No  In the past 6 months, have you accidentally leaked urine?: No  Do you have trouble with the stairs inside or outside your home?: No  Does your home have working smoke alarms?: Yes  Does your home have a carbon monoxide monitor?: Yes  Which safety hazards (if any) have you experienced in your home? Please select all that apply : none  How would you describe your current diet?  Please select all that apply : Regular  In addition to prescription medications, are you taking any over-the-counter supplements?: Yes  If yes, what supplements are you taking?: Collagen  Can you manage your medications?: Yes  Are you currently taking any opioid medications?: No  Can you walk and transfer into and out of your bed and chair?: Yes  Can you dress and groom yourself?: Yes  Can you bathe or shower yourself?: Yes  Can you feed yourself?: Yes  Can you do your laundry/ housekeeping?: Yes  Can you manage your money, pay your bills, and track your expenses?: Yes  Can you make your own meals?: Yes  Can you do your own shopping?: Yes  Within the last 12 months, have you had any hospitalizations or Emergency Department visits?: No  Do you have a living will?: Yes  Do you have a Durable POA (Power of ) for healthcare decisions?: No  Do you have an Advanced Directive for end of life decisions?: Yes  How often have you used an illegal drug (including marijuana) or a prescription medication for non-medical reasons in the past year?: never  What is the typical number of drinks you consume in a day?: 1  What is the typical number of drinks you consume in a week?: 5  How often did you have a drink containing alcohol in the past year?: 4 or more times a week  How many drinks did you have on a typical day  when you were drinking in the past year?: 1 to 2  How often did you have 6 or more drinks on one occasion in the past year?: never

## 2021-08-22 NOTE — PROGRESS NOTES
Answers for HPI/ROS submitted by the patient on 8/19/2021  How often during the last year have you found that you were not able to stop drinking once you had started?: 0 - never  How often during the last year have you failed to do what was normally expected from you because of drinking?: 0 - never  How often during the last year have you needed a first drink in the morning to get yourself going after a heavy drinking session?: 0 - never  How often during the last year have you had a feeling of guilt or remorse after drinking?: 0 - never  How often during the last year have you been unable to remember what happened the night before because you had been drinking?: 0 - never  Have you or someone else been injured as a result of your drinking?: 0 - no  Has a relative or friend or a doctor or another health worker been concerned about your drinking or suggested you cut down?: 0 - no  How would you rate your overall health?: very good  Compared to last year, how is your physical health?: same  In general, how satisfied are you with your life?: very satisfied  Compared to last year, how is your eyesight?: slightly worse  Compared to last year, how is your hearing?: same  Compared to last year, how is your emotional/mental health?: same  How often is anger a problem for you?: never, rarely  How often do you feel unusually tired/fatigued?: sometimes  In the past 7 days, how much pain have you experienced?: some  If you answered "some" or "a lot", please rate the severity of your pain on a scale of 1 to 10 (1 being the least severe pain and 10 being the most intense pain)  : 2/10  In the past 6 months, have you lost or gained 10 pounds without trying?: No  One or more falls in the last year: No  In the past 6 months, have you accidentally leaked urine?: No  Do you have trouble with the stairs inside or outside your home?: No  Does your home have working smoke alarms?: Yes  Does your home have a carbon monoxide monitor?: Yes  Which safety hazards (if any) have you experienced in your home? Please select all that apply : none  How would you describe your current diet?  Please select all that apply : Regular  In addition to prescription medications, are you taking any over-the-counter supplements?: Yes  If yes, what supplements are you taking?: Collagen  Can you manage your medications?: Yes  Are you currently taking any opioid medications?: No  Can you walk and transfer into and out of your bed and chair?: Yes  Can you dress and groom yourself?: Yes  Can you bathe or shower yourself?: Yes  Can you feed yourself?: Yes  Can you do your laundry/ housekeeping?: Yes  Can you manage your money, pay your bills, and track your expenses?: Yes  Can you make your own meals?: Yes  Can you do your own shopping?: Yes  Within the last 12 months, have you had any hospitalizations or Emergency Department visits?: No  Do you have a living will?: Yes  Do you have a Durable POA (Power of ) for healthcare decisions?: No  Do you have an Advanced Directive for end of life decisions?: Yes  How often have you used an illegal drug (including marijuana) or a prescription medication for non-medical reasons in the past year?: never  What is the typical number of drinks you consume in a day?: 1  What is the typical number of drinks you consume in a week?: 5  How often did you have a drink containing alcohol in the past year?: 4 or more times a week  How many drinks did you have on a typical day  when you were drinking in the past year?: 1 to 2  How often did you have 6 or more drinks on one occasion in the past year?: never

## 2021-09-01 LAB
ALBUMIN SERPL-MCNC: 4.4 G/DL (ref 3.8–4.8)
ALBUMIN/GLOB SERPL: 2 {RATIO} (ref 1.2–2.2)
ALP SERPL-CCNC: 75 IU/L (ref 48–121)
ALT SERPL-CCNC: 19 IU/L (ref 0–32)
AMYLASE SERPL-CCNC: 98 U/L (ref 31–110)
APPEARANCE UR: CLEAR
AST SERPL-CCNC: 24 IU/L (ref 0–40)
BACTERIA URNS QL MICRO: ABNORMAL
BASOPHILS # BLD AUTO: 0 X10E3/UL (ref 0–0.2)
BASOPHILS NFR BLD AUTO: 1 %
BILIRUB SERPL-MCNC: 0.6 MG/DL (ref 0–1.2)
BILIRUB UR QL STRIP: NEGATIVE
BUN SERPL-MCNC: 19 MG/DL (ref 8–27)
BUN/CREAT SERPL: 24 (ref 12–28)
CALCIUM SERPL-MCNC: 9.8 MG/DL (ref 8.7–10.3)
CASTS URNS QL MICRO: ABNORMAL /LPF
CHLORIDE SERPL-SCNC: 103 MMOL/L (ref 96–106)
CHOLEST SERPL-MCNC: 185 MG/DL (ref 100–199)
CO2 SERPL-SCNC: 25 MMOL/L (ref 20–29)
COLOR UR: YELLOW
CREAT SERPL-MCNC: 0.79 MG/DL (ref 0.57–1)
CRYSTALS URNS MICRO: ABNORMAL
EOSINOPHIL # BLD AUTO: 0.2 X10E3/UL (ref 0–0.4)
EOSINOPHIL NFR BLD AUTO: 3 %
EPI CELLS #/AREA URNS HPF: ABNORMAL /HPF (ref 0–10)
ERYTHROCYTE [DISTWIDTH] IN BLOOD BY AUTOMATED COUNT: 12 % (ref 11.7–15.4)
GLOBULIN SER-MCNC: 2.2 G/DL (ref 1.5–4.5)
GLUCOSE SERPL-MCNC: 91 MG/DL (ref 65–99)
GLUCOSE UR QL: NEGATIVE
HCT VFR BLD AUTO: 42.1 % (ref 34–46.6)
HDLC SERPL-MCNC: 71 MG/DL
HGB BLD-MCNC: 14.1 G/DL (ref 11.1–15.9)
HGB UR QL STRIP: NEGATIVE
IMM GRANULOCYTES # BLD: 0 X10E3/UL (ref 0–0.1)
IMM GRANULOCYTES NFR BLD: 0 %
KETONES UR QL STRIP: NEGATIVE
LDLC SERPL CALC-MCNC: 95 MG/DL (ref 0–99)
LEUKOCYTE ESTERASE UR QL STRIP: NEGATIVE
LIPASE SERPL-CCNC: 45 U/L (ref 14–72)
LYMPHOCYTES # BLD AUTO: 2.4 X10E3/UL (ref 0.7–3.1)
LYMPHOCYTES NFR BLD AUTO: 40 %
MAGNESIUM SERPL-MCNC: 2.1 MG/DL (ref 1.6–2.3)
MCH RBC QN AUTO: 31.5 PG (ref 26.6–33)
MCHC RBC AUTO-ENTMCNC: 33.5 G/DL (ref 31.5–35.7)
MCV RBC AUTO: 94 FL (ref 79–97)
MICRO URNS: NORMAL
MICRO URNS: NORMAL
MICRODELETION SYND BLD/T FISH: NORMAL
MONOCYTES # BLD AUTO: 0.5 X10E3/UL (ref 0.1–0.9)
MONOCYTES NFR BLD AUTO: 8 %
NEUTROPHILS # BLD AUTO: 3 X10E3/UL (ref 1.4–7)
NEUTROPHILS NFR BLD AUTO: 48 %
NITRITE UR QL STRIP: NEGATIVE
PH UR STRIP: 6.5 [PH] (ref 5–7.5)
PLATELET # BLD AUTO: 216 X10E3/UL (ref 150–450)
POTASSIUM SERPL-SCNC: 4.2 MMOL/L (ref 3.5–5.2)
PROT SERPL-MCNC: 6.6 G/DL (ref 6–8.5)
PROT UR QL STRIP: NORMAL
RBC # BLD AUTO: 4.47 X10E6/UL (ref 3.77–5.28)
RBC #/AREA URNS HPF: ABNORMAL /HPF (ref 0–2)
SL AMB EGFR AFRICAN AMERICAN: 88 ML/MIN/1.73
SL AMB EGFR NON AFRICAN AMERICAN: 77 ML/MIN/1.73
SODIUM SERPL-SCNC: 142 MMOL/L (ref 134–144)
SP GR UR: 1.03 (ref 1–1.03)
TRIGL SERPL-MCNC: 106 MG/DL (ref 0–149)
TSH SERPL DL<=0.005 MIU/L-ACNC: 5.32 UIU/ML (ref 0.45–4.5)
UNIDENT CRYS URNS QL MICRO: PRESENT
UROBILINOGEN UR STRIP-ACNC: 0.2 MG/DL (ref 0.2–1)
WBC # BLD AUTO: 6.1 X10E3/UL (ref 3.4–10.8)
WBC #/AREA URNS HPF: ABNORMAL /HPF (ref 0–5)

## 2021-09-12 DIAGNOSIS — E78.5 HYPERLIPIDEMIA, UNSPECIFIED HYPERLIPIDEMIA TYPE: ICD-10-CM

## 2021-09-13 RX ORDER — ROSUVASTATIN CALCIUM 5 MG/1
TABLET, COATED ORAL
Qty: 30 TABLET | Refills: 0 | Status: SHIPPED | OUTPATIENT
Start: 2021-09-13 | End: 2021-09-24 | Stop reason: SDUPTHER

## 2021-10-29 DIAGNOSIS — B00.1 RECURRENT COLD SORES: ICD-10-CM

## 2021-10-29 RX ORDER — VALACYCLOVIR HYDROCHLORIDE 500 MG/1
TABLET, FILM COATED ORAL
Qty: 30 TABLET | Refills: 3 | Status: SHIPPED | OUTPATIENT
Start: 2021-10-29 | End: 2021-10-29

## 2021-12-13 ENCOUNTER — VBI (OUTPATIENT)
Dept: ADMINISTRATIVE | Facility: OTHER | Age: 71
End: 2021-12-13

## 2022-01-09 NOTE — PROGRESS NOTES
Assessment/Plan:    1  Hypothyroidism, unspecified type  -     CBC; Future  -     TSH, 3rd generation; Future    2  Encounter for screening mammogram for malignant neoplasm of breast  -     Mammo screening bilateral w 3d & cad; Future; Expected date: 08/20/2021    3  Osteopenia, unspecified location  -     DXA bone density spine hip and pelvis; Future; Expected date: 08/20/2021  -     CBC; Future    4  Postmenopausal  -     DXA bone density spine hip and pelvis; Future; Expected date: 08/20/2021    5  Mixed hyperlipidemia  -     Amylase; Future  -     Lipase; Future  -     Lipid Panel with Direct LDL reflex; Future  -     Comprehensive metabolic panel; Future    6  Palpitations  -     CBC; Future  -     Comprehensive metabolic panel; Future  -     Magnesium; Future  -     TSH, 3rd generation; Future  -     UA (URINE) with reflex to Scope    7  Thyroid cyst    8  Medicare annual wellness visit, subsequent          Subjective:      Patient ID: Tr Mederos is a 73 y o  female  Chief Complaint   Patient presents with   [facility] Wellness Visit     AWV pt filled out on line    Follow-up       HPI  Follow-up  Overall feeling well  Active  No change in appetite/b/b  BP wnl  Due for lab studies/mammo/DEXA scan  Eye/dental care utd  Gyn--Dr Keith Altamirano  Colonoscopy done 2017-Dr Villela--+benign polyps/diverticulosis/hemorrhoids    The following portions of the patient's history were reviewed and updated as appropriate: allergies, current medications, past family history, past medical history, past social history, past surgical history and problem list     Review of Systems   Constitutional: Negative for fatigue and fever  Cardiovascular: Positive for palpitations  Gastrointestinal: Negative  Endocrine:        Hx thyroid d/o   Musculoskeletal: Positive for arthralgias  Skin: Negative  Allergic/Immunologic: Positive for environmental allergies  Neurological: Negative      Psychiatric/Behavioral: Positive for sleep disturbance  Current Outpatient Medications   Medication Sig Dispense Refill    aspirin 81 mg chewable tablet Chew daily      calcium carbonate 1250 MG capsule Take 1,250 mg by mouth 2 (two) times a day with meals      estradiol (VAGIFEM, YUVAFEM) 10 MCG TABS vaginal tablet Insert 1 tablet into the vagina      levothyroxine 25 mcg tablet TAKE 1 TABLET DAILY 90 tablet 1    metoprolol succinate (TOPROL-XL) 25 mg 24 hr tablet Take 25 mg by mouth daily      Multiple Vitamin (MULTIVITAMIN ADULT PO) Take 1 capsule by mouth      omega-3-acid ethyl esters (Lovaza) 1 g capsule Take 2 g by mouth      rosuvastatin (CRESTOR) 5 mg tablet TAKE 1 TABLET DAILY 30 tablet 0    tretinoin (RETIN-A) 0 025 % cream APPLY AT NIGHT AS DIRECTED  1    calcium carbonate (OS-TWAN) 600 MG tablet Take 1 tablet by mouth (Patient not taking: Reported on 8/20/2021)      estradiol (VAGIFEM) 10 MCG TABS vaginal tablet Insert into the vagina      valACYclovir (VALTREX) 500 mg tablet TAKE AS NEEDED AS DIRECTED 30 tablet 3     No current facility-administered medications for this visit  Objective:    /76 (BP Location: Left arm, Patient Position: Sitting, Cuff Size: Standard)   Temp 98 3 °F (36 8 °C) (Temporal)   Resp 14   Ht 5' 6" (1 676 m)   Wt 68 9 kg (152 lb)   BMI 24 53 kg/m²        Physical Exam  Vitals and nursing note reviewed  Constitutional:       General: She is not in acute distress  Appearance: Normal appearance  She is normal weight  Eyes:      General: No scleral icterus  Conjunctiva/sclera: Conjunctivae normal    Cardiovascular:      Rate and Rhythm: Normal rate and regular rhythm  Pulses: Normal pulses  Pulmonary:      Effort: Pulmonary effort is normal  No respiratory distress  Breath sounds: Normal breath sounds  Abdominal:      General: Bowel sounds are normal       Palpations: Abdomen is soft  Tenderness: There is no abdominal tenderness     Musculoskeletal: General: Normal range of motion  Cervical back: Neck supple  Skin:     General: Skin is warm and dry  Coloration: Skin is not jaundiced  Findings: No rash  Neurological:      General: No focal deficit present  Mental Status: She is alert and oriented to person, place, and time  Cranial Nerves: No cranial nerve deficit     Psychiatric:         Mood and Affect: Mood normal            Donaldo Anderson MD 5

## 2022-06-14 ENCOUNTER — OFFICE VISIT (OUTPATIENT)
Dept: FAMILY MEDICINE CLINIC | Facility: CLINIC | Age: 72
End: 2022-06-14
Payer: COMMERCIAL

## 2022-06-14 VITALS
BODY MASS INDEX: 25.01 KG/M2 | SYSTOLIC BLOOD PRESSURE: 120 MMHG | DIASTOLIC BLOOD PRESSURE: 80 MMHG | TEMPERATURE: 96.6 F | HEART RATE: 78 BPM | WEIGHT: 155.6 LBS | RESPIRATION RATE: 14 BRPM | HEIGHT: 66 IN

## 2022-06-14 DIAGNOSIS — N63.22 MASS OF UPPER INNER QUADRANT OF LEFT BREAST: Primary | ICD-10-CM

## 2022-06-14 PROCEDURE — 99213 OFFICE O/P EST LOW 20 MIN: CPT | Performed by: FAMILY MEDICINE

## 2022-06-17 DIAGNOSIS — N63.22 MASS OF UPPER INNER QUADRANT OF LEFT BREAST: ICD-10-CM

## 2022-06-21 DIAGNOSIS — N63.22 MASS OF UPPER INNER QUADRANT OF LEFT BREAST: Primary | ICD-10-CM

## 2022-07-01 NOTE — PROGRESS NOTES
Assessment/Plan:    1  Mass of upper inner quadrant of left breast  -     Mammo diagnostic bilateral w cad; Future; Expected date: 06/14/2022  -     US breast left limited (diagnostic); Future; Expected date: 06/14/2022  -     US breast right limited (diagnostic); Future; Expected date: 06/14/2022          Subjective:      Patient ID: Aamir Nance is a 81 y o  female  Chief Complaint   Patient presents with    Breast Mass     Left side of breast pt found this week        HPI  C/o left breast mass  Found on self breast exam   Denies trauma, rash or fever  No skin change or nipple d/c  No sig pain or pruritus      The following portions of the patient's history were reviewed and updated as appropriate: allergies, current medications, past family history, past medical history, past social history, past surgical history and problem list     Review of Systems    Per hpi  Current Outpatient Medications   Medication Sig Dispense Refill    aspirin 81 mg chewable tablet Chew daily      calcium carbonate 1250 MG capsule Take 1,250 mg by mouth 2 (two) times a day with meals      estradiol (VAGIFEM, YUVAFEM) 10 MCG TABS vaginal tablet Insert 1 tablet into the vagina      levothyroxine 50 mcg tablet TAKE 1 TABLET DAILY IN THE EARLY MORNING 90 tablet 0    Multiple Vitamin (MULTIVITAMIN ADULT PO) Take 1 capsule by mouth      omega-3-acid ethyl esters (LOVAZA) 1 g capsule Take 2 g by mouth      rosuvastatin (CRESTOR) 5 mg tablet Take 1 tablet (5 mg total) by mouth daily 90 tablet 3    metoprolol succinate (TOPROL-XL) 25 mg 24 hr tablet Take 25 mg by mouth daily      tretinoin (RETIN-A) 0 025 % cream APPLY AT NIGHT AS DIRECTED (Patient not taking: Reported on 6/14/2022)  1    valACYclovir (VALTREX) 500 mg tablet TAKE AS NEEDED AS DIRECTED 30 tablet 3     No current facility-administered medications for this visit         Objective:    /80 (BP Location: Left arm, Patient Position: Sitting, Cuff Size: Large) Pulse 78   Temp (!) 96 6 °F (35 9 °C)   Resp 14   Ht 5' 6" (1 676 m)   Wt 70 6 kg (155 lb 9 6 oz)   BMI 25 11 kg/m²        Physical Exam  Vitals and nursing note reviewed  Constitutional:       General: She is not in acute distress  Appearance: Normal appearance  Cardiovascular:      Rate and Rhythm: Normal rate and regular rhythm  Pulmonary:      Effort: Pulmonary effort is normal  No respiratory distress  Breath sounds: Normal breath sounds  Genitourinary:     Comments: Breast exam:  No SC or axillary LAD  Palpable lesion left upper inner quad   Not fixed, no sig tenderness  No skin dimpling or nipple d/c  Skin:     General: Skin is warm and dry  Coloration: Skin is not jaundiced  Findings: No erythema or rash  Neurological:      General: No focal deficit present  Mental Status: She is alert and oriented to person, place, and time  Cranial Nerves: No cranial nerve deficit     Psychiatric:         Mood and Affect: Mood normal              João Gong MD

## 2022-07-01 NOTE — PROGRESS NOTES
Assessment/Plan:    1  Mass of upper inner quadrant of left breast  -     Mammo diagnostic bilateral w cad; Future; Expected date: 06/14/2022  -     US breast left limited (diagnostic); Future; Expected date: 06/14/2022  -     US breast right limited (diagnostic); Future; Expected date: 06/14/2022      BMI Counseling: Body mass index is 25 11 kg/m²  The BMI is above normal  Nutrition recommendations include encouraging healthy choices of fruits and vegetables, consuming healthier snacks, moderation in carbohydrate intake, increasing intake of lean protein, reducing intake of saturated and trans fat and reducing intake of cholesterol  Exercise recommendations include exercising 3-5 times per week and strength training exercises  No pharmacotherapy was ordered  Subjective:      Patient ID: Israel Fuller is a 81 y o  female      Chief Complaint   Patient presents with    Breast Mass     Left side of breast pt found this week        HPI  C/o left breast mass  Found on self breast exam   Denies trauma, rash or fever  No skin change or nipple d/c  No sig pain or pruritus      The following portions of the patient's history were reviewed and updated as appropriate: allergies, current medications, past family history, past medical history, past social history, past surgical history and problem list     Review of Systems    Per hpi  Current Outpatient Medications   Medication Sig Dispense Refill    aspirin 81 mg chewable tablet Chew daily      calcium carbonate 1250 MG capsule Take 1,250 mg by mouth 2 (two) times a day with meals      estradiol (VAGIFEM, YUVAFEM) 10 MCG TABS vaginal tablet Insert 1 tablet into the vagina      levothyroxine 50 mcg tablet TAKE 1 TABLET DAILY IN THE EARLY MORNING 90 tablet 0    Multiple Vitamin (MULTIVITAMIN ADULT PO) Take 1 capsule by mouth      omega-3-acid ethyl esters (LOVAZA) 1 g capsule Take 2 g by mouth      rosuvastatin (CRESTOR) 5 mg tablet Take 1 tablet (5 mg total) by mouth daily 90 tablet 3    metoprolol succinate (TOPROL-XL) 25 mg 24 hr tablet Take 25 mg by mouth daily      tretinoin (RETIN-A) 0 025 % cream APPLY AT NIGHT AS DIRECTED (Patient not taking: Reported on 6/14/2022)  1    valACYclovir (VALTREX) 500 mg tablet TAKE AS NEEDED AS DIRECTED 30 tablet 3     No current facility-administered medications for this visit  Objective:    /80 (BP Location: Left arm, Patient Position: Sitting, Cuff Size: Large)   Pulse 78   Temp (!) 96 6 °F (35 9 °C)   Resp 14   Ht 5' 6" (1 676 m)   Wt 70 6 kg (155 lb 9 6 oz)   BMI 25 11 kg/m²        Physical Exam  Vitals and nursing note reviewed  Constitutional:       General: She is not in acute distress  Appearance: Normal appearance  Cardiovascular:      Rate and Rhythm: Normal rate and regular rhythm  Pulmonary:      Effort: Pulmonary effort is normal  No respiratory distress  Breath sounds: Normal breath sounds  Genitourinary:     Comments: Breast exam:  No SC or axillary LAD  Palpable lesion left upper inner quad   Not fixed, no sig tenderness  No skin dimpling or nipple d/c  Skin:     General: Skin is warm and dry  Coloration: Skin is not jaundiced  Findings: No erythema or rash  Neurological:      General: No focal deficit present  Mental Status: She is alert and oriented to person, place, and time  Cranial Nerves: No cranial nerve deficit     Psychiatric:         Mood and Affect: Mood normal              Tennille Michelle MD

## 2022-07-01 NOTE — PROGRESS NOTES
Assessment/Plan:    1  Mass of upper inner quadrant of left breast  -     Mammo diagnostic bilateral w cad; Future; Expected date: 06/14/2022  -     US breast left limited (diagnostic); Future; Expected date: 06/14/2022  -     US breast right limited (diagnostic); Future; Expected date: 06/14/2022      BMI Counseling: Body mass index is 25 11 kg/m²  The BMI is above normal  Nutrition recommendations include encouraging healthy choices of fruits and vegetables, consuming healthier snacks, moderation in carbohydrate intake, increasing intake of lean protein, reducing intake of saturated and trans fat and reducing intake of cholesterol  Exercise recommendations include exercising 3-5 times per week and strength training exercises  No pharmacotherapy was ordered  Subjective:      Patient ID: Kelly Sheppard is a 81 y o  female      Chief Complaint   Patient presents with    Breast Mass     Left side of breast pt found this week        HPI  C/o left breast mass  Found on self breast exam   Denies trauma, rash or fever  No skin change or nipple d/c  No sig pain or pruritus      The following portions of the patient's history were reviewed and updated as appropriate: allergies, current medications, past family history, past medical history, past social history, past surgical history and problem list     Review of Systems    Per hpi  Current Outpatient Medications   Medication Sig Dispense Refill    aspirin 81 mg chewable tablet Chew daily      calcium carbonate 1250 MG capsule Take 1,250 mg by mouth 2 (two) times a day with meals      estradiol (VAGIFEM, YUVAFEM) 10 MCG TABS vaginal tablet Insert 1 tablet into the vagina      levothyroxine 50 mcg tablet TAKE 1 TABLET DAILY IN THE EARLY MORNING 90 tablet 0    Multiple Vitamin (MULTIVITAMIN ADULT PO) Take 1 capsule by mouth      omega-3-acid ethyl esters (LOVAZA) 1 g capsule Take 2 g by mouth      rosuvastatin (CRESTOR) 5 mg tablet Take 1 tablet (5 mg total) by mouth daily 90 tablet 3    metoprolol succinate (TOPROL-XL) 25 mg 24 hr tablet Take 25 mg by mouth daily      tretinoin (RETIN-A) 0 025 % cream APPLY AT NIGHT AS DIRECTED (Patient not taking: Reported on 6/14/2022)  1    valACYclovir (VALTREX) 500 mg tablet TAKE AS NEEDED AS DIRECTED 30 tablet 3     No current facility-administered medications for this visit  Objective:    /80 (BP Location: Left arm, Patient Position: Sitting, Cuff Size: Large)   Pulse 78   Temp (!) 96 6 °F (35 9 °C)   Resp 14   Ht 5' 6" (1 676 m)   Wt 70 6 kg (155 lb 9 6 oz)   BMI 25 11 kg/m²        Physical Exam  Vitals and nursing note reviewed  Constitutional:       General: She is not in acute distress  Appearance: Normal appearance  Cardiovascular:      Rate and Rhythm: Normal rate and regular rhythm  Pulmonary:      Effort: Pulmonary effort is normal  No respiratory distress  Breath sounds: Normal breath sounds  Genitourinary:     Comments: Breast exam:  No SC or axillary LAD  Palpable lesion left upper inner quad   Not fixed, no sig tenderness  No skin dimpling or nipple d/c  Skin:     General: Skin is warm and dry  Coloration: Skin is not jaundiced  Findings: No erythema or rash  Neurological:      General: No focal deficit present  Mental Status: She is alert and oriented to person, place, and time  Cranial Nerves: No cranial nerve deficit     Psychiatric:         Mood and Affect: Mood normal              Vini Tellez MD

## 2022-08-19 ENCOUNTER — RA CDI HCC (OUTPATIENT)
Dept: OTHER | Facility: HOSPITAL | Age: 72
End: 2022-08-19

## 2022-08-19 NOTE — PROGRESS NOTES
[facility]  coding opportunities       Chart reviewed, no opportunity found:   [address]        Patients Insurance     Medicare Insurance: [facility]

## 2022-08-26 ENCOUNTER — OFFICE VISIT (OUTPATIENT)
Dept: FAMILY MEDICINE CLINIC | Facility: CLINIC | Age: 72
End: 2022-08-26
Payer: COMMERCIAL

## 2022-08-26 VITALS
HEIGHT: 66 IN | SYSTOLIC BLOOD PRESSURE: 122 MMHG | TEMPERATURE: 97.4 F | BODY MASS INDEX: 24.72 KG/M2 | HEART RATE: 64 BPM | WEIGHT: 153.8 LBS | DIASTOLIC BLOOD PRESSURE: 80 MMHG | RESPIRATION RATE: 14 BRPM

## 2022-08-26 DIAGNOSIS — D17.1 BREAST LIPOMA: ICD-10-CM

## 2022-08-26 DIAGNOSIS — Z78.0 POSTMENOPAUSAL: ICD-10-CM

## 2022-08-26 DIAGNOSIS — Z00.00 MEDICARE ANNUAL WELLNESS VISIT, SUBSEQUENT: Primary | ICD-10-CM

## 2022-08-26 DIAGNOSIS — R30.0 DYSURIA: ICD-10-CM

## 2022-08-26 DIAGNOSIS — M17.0 OSTEOARTHRITIS OF BOTH KNEES, UNSPECIFIED OSTEOARTHRITIS TYPE: ICD-10-CM

## 2022-08-26 DIAGNOSIS — E03.9 HYPOTHYROIDISM, UNSPECIFIED TYPE: ICD-10-CM

## 2022-08-26 DIAGNOSIS — E78.5 HYPERLIPIDEMIA, UNSPECIFIED HYPERLIPIDEMIA TYPE: ICD-10-CM

## 2022-08-26 PROBLEM — Z23 NEED FOR PNEUMOCOCCAL VACCINATION: Status: RESOLVED | Noted: 2020-09-07 | Resolved: 2022-08-26

## 2022-08-26 LAB
SL AMB  POCT GLUCOSE, UA: ABNORMAL
SL AMB LEUKOCYTE ESTERASE,UA: ABNORMAL
SL AMB POCT BILIRUBIN,UA: ABNORMAL
SL AMB POCT BLOOD,UA: ABNORMAL
SL AMB POCT CLARITY,UA: ABNORMAL
SL AMB POCT COLOR,UA: YELLOW
SL AMB POCT KETONES,UA: 15
SL AMB POCT NITRITE,UA: ABNORMAL
SL AMB POCT PH,UA: ABNORMAL
SL AMB POCT SPECIFIC GRAVITY,UA: 5
SL AMB POCT URINE PROTEIN: ABNORMAL
SL AMB POCT UROBILINOGEN: ABNORMAL

## 2022-08-26 PROCEDURE — 81003 URINALYSIS AUTO W/O SCOPE: CPT | Performed by: FAMILY MEDICINE

## 2022-08-26 PROCEDURE — 99214 OFFICE O/P EST MOD 30 MIN: CPT | Performed by: FAMILY MEDICINE

## 2022-08-26 PROCEDURE — 1090F PRES/ABSN URINE INCON ASSESS: CPT | Performed by: FAMILY MEDICINE

## 2022-08-26 PROCEDURE — G0439 PPPS, SUBSEQ VISIT: HCPCS | Performed by: FAMILY MEDICINE

## 2022-08-26 NOTE — PATIENT INSTRUCTIONS
Medicare Preventive Visit Patient Instructions  Thank you for completing your Welcome to Medicare Visit or Medicare Annual Wellness Visit today  Your next wellness visit will be due in one year (8/27/2023)  The screening/preventive services that you may require over the next 5-10 years are detailed below  Some tests may not apply to you based off risk factors and/or age  Screening tests ordered at today's visit but not completed yet may show as past due  Also, please note that scanned in results may not display below  Preventive Screenings:  Service Recommendations Previous Testing/Comments   Colorectal Cancer Screening  * Colonoscopy    * Fecal Occult Blood Test (FOBT)/Fecal Immunochemical Test (FIT)  * Fecal DNA/Cologuard Test  * Flexible Sigmoidoscopy Age: 39-70 years old   Colonoscopy: every 10 years (may be performed more frequently if at higher risk)  OR  FOBT/FIT: every 1 year  OR  Cologuard: every 3 years  OR  Sigmoidoscopy: every 5 years  Screening may be recommended earlier than age 39 if at higher risk for colorectal cancer  Also, an individualized decision between you and your healthcare provider will decide whether screening between the ages of 74-80 would be appropriate  Colonoscopy: 11/27/2017  FOBT/FIT: Not on file  Cologuard: Not on file  Sigmoidoscopy: Not on file    Screening Current     Breast Cancer Screening Age: 36 years old  Frequency: every 1-2 years  Not required if history of left and right mastectomy Mammogram: 06/16/2022    Screening Current   Cervical Cancer Screening Between the ages of 21-29, pap smear recommended once every 3 years  Between the ages of 33-67, can perform pap smear with HPV co-testing every 5 years     Recommendations may differ for women with a history of total hysterectomy, cervical cancer, or abnormal pap smears in past  Pap Smear: Not on file    Screening Not Indicated   Hepatitis C Screening Once for adults born between 1945 and 1965  More frequently in patients at high risk for Hepatitis C Hep C Antibody: 07/23/2018    Screening Current   Diabetes Screening 1-2 times per year if you're at risk for diabetes or have pre-diabetes Fasting glucose: No results in last 5 years (No results in last 5 years)  A1C: 5 4 % (7/23/2018)  Screening Current   Cholesterol Screening Once every 5 years if you don't have a lipid disorder  May order more often based on risk factors  Lipid panel: 08/31/2021    Screening Not Indicated  History Lipid Disorder     Other Preventive Screenings Covered by Medicare:  1  Abdominal Aortic Aneurysm (AAA) Screening: covered once if your at risk  You're considered to be at risk if you have a family history of AAA  2  Lung Cancer Screening: covers low dose CT scan once per year if you meet all of the following conditions: (1) Age 50-69; (2) No signs or symptoms of lung cancer; (3) Current smoker or have quit smoking within the last 15 years; (4) You have a tobacco smoking history of at least 20 pack years (packs per day multiplied by number of years you smoked); (5) You get a written order from a healthcare provider  3  Glaucoma Screening: covered annually if you're considered high risk: (1) You have diabetes OR (2) Family history of glaucoma OR (3)  aged 48 and older OR (3)  American aged 72 and older  3  Osteoporosis Screening: covered every 2 years if you meet one of the following conditions: (1) You're estrogen deficient and at risk for osteoporosis based off medical history and other findings; (2) Have a vertebral abnormality; (3) On glucocorticoid therapy for more than 3 months; (4) Have primary hyperparathyroidism; (5) On osteoporosis medications and need to assess response to drug therapy  · Last bone density test (DXA Scan): 09/13/2021   5  HIV Screening: covered annually if you're between the age of 15-65  Also covered annually if you are younger than 13 and older than 72 with risk factors for HIV infection   For pregnant patients, it is covered up to 3 times per pregnancy  Immunizations:  Immunization Recommendations   Influenza Vaccine Annual influenza vaccination during flu season is recommended for all persons aged >= 6 months who do not have contraindications   Pneumococcal Vaccine   * Pneumococcal conjugate vaccine = PCV13 (Prevnar 13), PCV15 (Vaxneuvance), PCV20 (Prevnar 20)  * Pneumococcal polysaccharide vaccine = PPSV23 (Pneumovax) Adults 25-60 years old: 1-3 doses may be recommended based on certain risk factors  Adults 72 years old: 1-2 doses may be recommended based off what pneumonia vaccine you previously received   Hepatitis B Vaccine 3 dose series if at intermediate or high risk (ex: diabetes, end stage renal disease, liver disease)   Tetanus (Td) Vaccine - COST NOT COVERED BY MEDICARE PART B Following completion of primary series, a booster dose should be given every 10 years to maintain immunity against tetanus  Td may also be given as tetanus wound prophylaxis  Tdap Vaccine - COST NOT COVERED BY MEDICARE PART B Recommended at least once for all adults  For pregnant patients, recommended with each pregnancy  Shingles Vaccine (Shingrix) - COST NOT COVERED BY MEDICARE PART B  2 shot series recommended in those aged 48 and above     Health Maintenance Due:      Topic Date Due    Breast Cancer Screening: Mammogram  06/16/2023    DXA SCAN  09/13/2023    Colorectal Cancer Screening  11/27/2027    Hepatitis C Screening  Completed     Immunizations Due:      Topic Date Due    Influenza Vaccine (1) 09/01/2022     Advance Directives   What are advance directives? Advance directives are legal documents that state your wishes and plans for medical care  These plans are made ahead of time in case you lose your ability to make decisions for yourself  Advance directives can apply to any medical decision, such as the treatments you want, and if you want to donate organs     What are the types of advance directives? There are many types of advance directives, and each state has rules about how to use them  You may choose a combination of any of the following:  · Living will: This is a written record of the treatment you want  You can also choose which treatments you do not want, which to limit, and which to stop at a certain time  This includes surgery, medicine, IV fluid, and tube feedings  · Durable power of  for healthcare [facility]): This is a written record that states who you want to make healthcare choices for you when you are unable to make them for yourself  This person, called a proxy, is usually a family member or a friend  You may choose more than 1 proxy  · Do not resuscitate (DNR) order:  A DNR order is used in case your heart stops beating or you stop breathing  It is a request not to have certain forms of treatment, such as CPR  A DNR order may be included in other types of advance directives  · Medical directive: This covers the care that you want if you are in a coma, near death, or unable to make decisions for yourself  You can list the treatments you want for each condition  Treatment may include pain medicine, surgery, blood transfusions, dialysis, IV or tube feedings, and a ventilator (breathing machine)  · Values history: This document has questions about your views, beliefs, and how you feel and think about life  This information can help others choose the care that you would choose  Why are advance directives important? An advance directive helps you control your care  Although spoken wishes may be used, it is better to have your wishes written down  Spoken wishes can be misunderstood, or not followed  Treatments may be given even if you do not want them  An advance directive may make it easier for your family to make difficult choices about your care  Alcohol Use and Your Health    Drinking too much can harm your health    Excessive alcohol use leads to about 88,000 death in the [place] each year, and shortens the life of those who diet by almost 30 years  Further, excessive drinking cost the economy $249 billion in 2010  Most excessive drinkers are not alcohol dependent  Excessive alcohol use has immediate effects that increase the risk of many harmful health conditions  These are most often the result of binge drinking  Over time, excessive alcohol use can lead to the development of chronic diseases and other series health problems  What is considered a "drink"? Excessive alcohol use includes:  · Binge Drinking: For women, 4 or more drinks consumed on one occasion  For men, 5 or more drinks consumed on one occasion  · Heavy Drinking: For women, 8 or more drinks per week  For men, 15 or more drinks per week  · Any alcohol used by pregnant women  · Any alcohol used by those under the age of 21 years    If you choose to drink, do so in moderation:  · Do not drink at all if you are under the age of 24, or if you are or may be pregnant, or have health problems that could be made worse by drinking    · For women, up to 1 drink per day  · For men, up to 2 drinks a day    No one should begin drinking or drink more frequently based on potential health benefits    Short-Term Health Risks:  · Injuries: motor vehicle crashes, falls, drownings, burns  · Violence: homicide, suicide, sexual assault, intimate partner violence  · Alcohol poisoning  · Reproductive health: risky sexual behaviors, unintended prengnacy, sexually transmitted diseases, miscarriage, stillbirth, fetal alcohol syndrome    Long-Term Health Risks:  · Chronic diseases: high blood pressure, heart disease, stroke, liver disease, digestive problems  · Cancers: breast, mouth and throat, liver, colon  · Learning and memory problems: dementia, poor school performance  · Mental health: depression, anxiety, insomnia  · Social problems: lost productivity, family problems, unemployment  · Alcohol dependence    For support and more information:  · Substance Abuse and [address] , [address] [place]  Web Address: https://logolineup/    · Alcoholics Anonymous        Web Address: http://www dan info/    https://www cdc gov/alcohol/fact-sheets/alcohol-use htm     © [address] 2018 Information is for End User's use only and may not be sold, redistributed or otherwise used for commercial purposes   All illustrations and images included in CareNotes® are the copyrighted property of A D A M , [employer]  or [address] [place] [address]

## 2022-08-26 NOTE — PROGRESS NOTES
Assessment and Plan:     Problem List Items Addressed This Visit     Hypothyroidism    Relevant Orders    Lipase (Completed)    CBC (Completed)    Comprehensive metabolic panel (Completed)    Magnesium (Completed)    TSH, 3rd generation (Completed)    Hyperlipidemia    Relevant Orders    Amylase (Completed)    Lipase (Completed)    CBC (Completed)    Comprehensive metabolic panel (Completed)    Lipid Panel with Direct LDL reflex (Completed)    Magnesium (Completed)    Medicare annual wellness visit, subsequent - Primary    Postmenopausal    Osteoarthritis of both knees    BMI 24 0-24 9, adult    Breast lipoma    Dysuria    Relevant Orders    POCT urine dip auto non-scope (Completed)           Preventive health issues were discussed with patient, and age appropriate screening tests were ordered as noted in patient's After Visit Summary  Personalized health advice and appropriate referrals for health education or preventive services given if needed, as noted in patient's After Visit Summary  History of Present Illness:     Patient presents for a Medicare Wellness Visit and follow-up    HPI   Interval hx reviewed  C/o intermittent left knee discomfort--mainly w stairs  No rash or feverBP in range  Due for labs  Eye/dental utd  Follow-up breast imaging after last ov--c/w probable lipoma--rpt imaging in 6 mths  Patient Care Team:  Marge Seaman MD as PCP - Ranjan Cordoba MD     Review of Systems:     Review of Systems   Constitutional: Negative  Respiratory: Negative  Cardiovascular: Negative  Musculoskeletal: Positive for arthralgias  Skin: Negative  Neurological: Negative           Problem List:     Patient Active Problem List   Diagnosis    Arthritis    Heart murmur    Hypothyroidism    Mitral valve prolapse    Thyroid cyst    Osteopenia    Hyperlipidemia    Palpitations    Medicare annual wellness visit, subsequent    Postmenopausal    Osteoarthritis of both knees    BMI 24 0-24 9, adult    Breast lipoma    Dysuria      Past Medical and Surgical History:     Past Medical History:   Diagnosis Date    Endometriosis     last assessed 07/17/2017    MVP (mitral valve prolapse) 1980     Past Surgical History:   Procedure Laterality Date    HYSTERECTOMY  01/17/1993      Family History:     Family History   Problem Relation Age of Onset    Stroke Mother         CVA    Other Mother         cardiac disorder    Other Father         cardiac disorder      Social History:     Social History     Socioeconomic History    Marital status: /Civil Union     Spouse name: None    Number of children: None    Years of education: None    Highest education level: None   Occupational History    None   Tobacco Use    Smoking status: Former Smoker    Smokeless tobacco: Never Used   Vaping Use    Vaping Use: Never used   Substance and Sexual Activity    Alcohol use: Yes     Comment: 4x weekly glass of wine    Drug use: No    Sexual activity: Never   Other Topics Concern    None   Social History Narrative    None     Social Determinants of Health     Financial Resource Strain: Low Risk     Difficulty of Paying Living Expenses: Not hard at all   Food Insecurity: Not on file   Transportation Needs: No Transportation Needs    Lack of Transportation (Medical): No    Lack of Transportation (Non-Medical):  No   Physical Activity: Not on file   Stress: Not on file   Social Connections: Not on file   Intimate Partner Violence: Not on file   Housing Stability: Not on file      Medications and Allergies:     Current Outpatient Medications   Medication Sig Dispense Refill    aspirin 81 mg chewable tablet Chew daily      calcium carbonate 1250 MG capsule Take 1,250 mg by mouth 2 (two) times a day with meals      estradiol (VAGIFEM, YUVAFEM) 10 MCG TABS vaginal tablet Insert 1 tablet into the vagina      levothyroxine 50 mcg tablet TAKE 1 TABLET DAILY IN THE EARLY MORNING 90 tablet 1    Multiple Vitamin (MULTIVITAMIN ADULT PO) Take 1 capsule by mouth      omega-3-acid ethyl esters (LOVAZA) 1 g capsule Take 2 g by mouth      rosuvastatin (CRESTOR) 5 mg tablet TAKE 1 TABLET DAILY 90 tablet 1    metoprolol succinate (TOPROL-XL) 25 mg 24 hr tablet Take 25 mg by mouth daily      valACYclovir (VALTREX) 500 mg tablet TAKE AS NEEDED AS DIRECTED 30 tablet 3     No current facility-administered medications for this visit  No Known Allergies   Immunizations:     Immunization History   Administered Date(s) Administered    COVID-19 MODERNA VACC 0 25 ML IM BOOSTER 01/21/2021    COVID-19 MODERNA VACC 0 5 ML IM 01/21/2021, 03/02/2021, 09/20/2021, 03/30/2022    INFLUENZA 10/28/2021    Influenza, high dose seasonal 0 7 mL 09/16/2020    Influenza, seasonal, injectable 09/21/2010, 11/12/2012, 10/17/2016, 08/30/2017    Pneumococcal Conjugate 13-Valent 07/17/2017    Pneumococcal Polysaccharide PPV23 08/17/2020    Tdap 12/14/2017    Tuberculin Skin Test-PPD Intradermal 09/21/2010      Health Maintenance:         Topic Date Due    Breast Cancer Screening: Mammogram  06/16/2023    DXA SCAN  09/13/2023    Colorectal Cancer Screening  11/27/2027    Hepatitis C Screening  Completed         Topic Date Due    Influenza Vaccine (1) 09/01/2022      Medicare Screening Tests and Risk Assessments:     Timothy Guzman is here for her Subsequent Wellness visit  Last Medicare Wellness visit information reviewed, patient interviewed and updates made to the record today  Health Risk Assessment:   Patient rates overall health as very good  Patient feels that their physical health rating is same  Patient is very satisfied with their life  Eyesight was rated as same  Hearing was rated as same  Patient feels that their emotional and mental health rating is same  Patients states they are never, rarely angry  Patient states they are sometimes unusually tired/fatigued  Pain experienced in the last 7 days has been some  Patient's pain rating has been 4/10  Patient states that she has experienced no weight loss or gain in last 6 months  Fall Risk Screening: In the past year, patient has experienced: no history of falling in past year      Urinary Incontinence Screening:   Patient has not leaked urine accidently in the last six months  Home Safety:  Patient has trouble with stairs inside or outside of their home  Patient has working smoke alarms and has working carbon monoxide detector  Home safety hazards include: none  Nutrition:   Current diet is Regular  Medications:   Patient is currently taking over-the-counter supplements  OTC medications include: Calcium  Patient is able to manage medications  Activities of Daily Living (ADLs)/Instrumental Activities of Daily Living (IADLs):   Walk and transfer into and out of bed and chair?: Yes  Dress and groom yourself?: Yes    Bathe or shower yourself?: Yes    Feed yourself? Yes  Do your laundry/housekeeping?: Yes  Manage your money, pay your bills and track your expenses?: Yes  Make your own meals?: Yes    Do your own shopping?: Yes    Previous Hospitalizations:   Any hospitalizations or ED visits within the last 12 months?: No      Advance Care Planning:   Living will: Yes    Durable POA for healthcare:  Yes    Advanced directive: Yes    Advanced directive counseling given: Yes    Five wishes given: Yes      Cognitive Screening:   Provider or family/friend/caregiver concerned regarding cognition?: No    PREVENTIVE SCREENINGS      Cardiovascular Screening:    General: Screening Not Indicated and History Lipid Disorder      Diabetes Screening:     General: Screening Current      Colorectal Cancer Screening:     General: Screening Current      Breast Cancer Screening:     General: Screening Current      Cervical Cancer Screening:    General: Screening Not Indicated      Osteoporosis Screening:    General: Screening Current      Abdominal Aortic Aneurysm (AAA) Screening:        General: Screening Not Indicated      Lung Cancer Screening:     General: Screening Not Indicated      Hepatitis C Screening:    General: Screening Current    Hep C Screening Accepted: Yes      Screening, Brief Intervention, and Referral to Treatment (SBIRT)    Screening  Typical number of drinks in a day: 1  Typical number of drinks in a week: 5  Interpretation: Low risk drinking behavior  AUDIT-C Screenin) How often did you have a drink containing alcohol in the past year? 4 or more times a week  2) How many drinks did you have on a typical day when you were drinking in the past year? 1 to 2  3) How often did you have 6 or more drinks on one occasion in the past year? never    AUDIT-C Score: 4  Interpretation: Score 3-12 (female): POSITIVE screen for alcohol misuse    AUDIT Screenin) How often during the last year have you found that you were not able to stop drinking once you had started? 0 - never  5) How often during the last year have you failed to do what was normally expected from you because of drinking? 0 - never  6) How often during the last year have you needed a first drink in the morning to get yourself going after a heavy drinking session?  0 - never  7) How often during the last year have you had a feeling of guilt or remorse after drinking? 0 - never  8) How often during the last year have you been unable to remember what happened the night before because you had been drinking? 0 - never  9) Have you or someone else been injured as a result of your drinking? 0 - no  10) Has a relative or friend or a doctor or another health worker been concerned about your drinking or suggested you cut down? 0 - no    AUDIT Score: 4  Interpretation: Low risk alcohol consumption    Single Item Drug Screening:  How often have you used an illegal drug (including marijuana) or a prescription medication for non-medical reasons in the past year? never    Single Item Drug Screen Score: 0  Interpretation: Negative screen for possible drug use disorder    Other Counseling Topics:   Skin self-exam, sunscreen and regular weightbearing exercise and calcium and vitamin D intake  Visual Acuity Screening    Right eye Left eye Both eyes   Without correction:      With correction: 20/25 20/70 20/25        Physical Exam:     /80 (BP Location: Left arm, Patient Position: Sitting, Cuff Size: Large)   Pulse 64   Temp (!) 97 4 °F (36 3 °C)   Resp 14   Ht 5' 6" (1 676 m)   Wt 69 8 kg (153 lb 12 8 oz)   BMI 24 82 kg/m²     Physical Exam  Vitals and nursing note reviewed  Constitutional:       General: She is not in acute distress  Appearance: Normal appearance  Cardiovascular:      Rate and Rhythm: Normal rate and regular rhythm  Pulmonary:      Effort: Pulmonary effort is normal  No respiratory distress  Breath sounds: Normal breath sounds  Musculoskeletal:         General: Tenderness (left medial knee-min swelling/crepitance, no palpable Baker's cyst, no calf erythema) present  Normal range of motion  Cervical back: Neck supple  Right lower leg: No edema  Left lower leg: No edema  Skin:     General: Skin is warm and dry  Coloration: Skin is not jaundiced  Neurological:      General: No focal deficit present  Mental Status: She is alert and oriented to person, place, and time  Cranial Nerves: No cranial nerve deficit     Psychiatric:         Mood and Affect: Mood normal           João Gong MD

## 2022-08-30 ENCOUNTER — APPOINTMENT (OUTPATIENT)
Dept: LAB | Facility: CLINIC | Age: 72
End: 2022-08-30
Payer: COMMERCIAL

## 2022-08-30 DIAGNOSIS — E03.9 HYPOTHYROIDISM, UNSPECIFIED TYPE: ICD-10-CM

## 2022-08-30 DIAGNOSIS — E78.5 HYPERLIPIDEMIA, UNSPECIFIED HYPERLIPIDEMIA TYPE: ICD-10-CM

## 2022-08-30 LAB
ALBUMIN SERPL BCP-MCNC: 3.6 G/DL (ref 3.5–5)
ALP SERPL-CCNC: 67 U/L (ref 46–116)
ALT SERPL W P-5'-P-CCNC: 29 U/L (ref 12–78)
AMYLASE SERPL-CCNC: 76 IU/L (ref 25–115)
ANION GAP SERPL CALCULATED.3IONS-SCNC: 3 MMOL/L (ref 4–13)
AST SERPL W P-5'-P-CCNC: 22 U/L (ref 5–45)
BILIRUB SERPL-MCNC: 0.77 MG/DL (ref 0.2–1)
BUN SERPL-MCNC: 25 MG/DL (ref 5–25)
CALCIUM SERPL-MCNC: 9.8 MG/DL (ref 8.3–10.1)
CHLORIDE SERPL-SCNC: 108 MMOL/L (ref 96–108)
CHOLEST SERPL-MCNC: 166 MG/DL
CO2 SERPL-SCNC: 27 MMOL/L (ref 21–32)
CREAT SERPL-MCNC: 0.82 MG/DL (ref 0.6–1.3)
ERYTHROCYTE [DISTWIDTH] IN BLOOD BY AUTOMATED COUNT: 12.9 % (ref 11.6–15.1)
GFR SERPL CREATININE-BSD FRML MDRD: 72 ML/MIN/1.73SQ M
GLUCOSE P FAST SERPL-MCNC: 95 MG/DL (ref 65–99)
HCT VFR BLD AUTO: 42.9 % (ref 34.8–46.1)
HDLC SERPL-MCNC: 70 MG/DL
HGB BLD-MCNC: 13.9 G/DL (ref 11.5–15.4)
LDLC SERPL CALC-MCNC: 80 MG/DL (ref 0–100)
LIPASE SERPL-CCNC: 162 U/L (ref 73–393)
MAGNESIUM SERPL-MCNC: 2.1 MG/DL (ref 1.6–2.6)
MCH RBC QN AUTO: 31.7 PG (ref 26.8–34.3)
MCHC RBC AUTO-ENTMCNC: 32.4 G/DL (ref 31.4–37.4)
MCV RBC AUTO: 98 FL (ref 82–98)
PLATELET # BLD AUTO: 218 THOUSANDS/UL (ref 149–390)
PMV BLD AUTO: 13.8 FL (ref 8.9–12.7)
POTASSIUM SERPL-SCNC: 4.5 MMOL/L (ref 3.5–5.3)
PROT SERPL-MCNC: 6.8 G/DL (ref 6.4–8.4)
RBC # BLD AUTO: 4.39 MILLION/UL (ref 3.81–5.12)
SODIUM SERPL-SCNC: 138 MMOL/L (ref 135–147)
TRIGL SERPL-MCNC: 80 MG/DL
TSH SERPL DL<=0.05 MIU/L-ACNC: 2.18 UIU/ML (ref 0.45–4.5)
WBC # BLD AUTO: 6.23 THOUSAND/UL (ref 4.31–10.16)

## 2022-08-30 PROCEDURE — 80061 LIPID PANEL: CPT

## 2022-08-30 PROCEDURE — 83735 ASSAY OF MAGNESIUM: CPT

## 2022-08-30 PROCEDURE — 36415 COLL VENOUS BLD VENIPUNCTURE: CPT

## 2022-08-30 PROCEDURE — 82150 ASSAY OF AMYLASE: CPT

## 2022-08-30 PROCEDURE — 83690 ASSAY OF LIPASE: CPT

## 2022-08-30 PROCEDURE — 85027 COMPLETE CBC AUTOMATED: CPT

## 2022-08-30 PROCEDURE — 80053 COMPREHEN METABOLIC PANEL: CPT

## 2022-08-30 PROCEDURE — 84443 ASSAY THYROID STIM HORMONE: CPT

## 2022-09-01 PROBLEM — R30.0 DYSURIA: Status: ACTIVE | Noted: 2022-09-01

## 2022-09-01 PROBLEM — D17.1 BREAST LIPOMA: Status: ACTIVE | Noted: 2022-09-01

## 2022-09-08 NOTE — PROGRESS NOTES
History     Chief Complaint   Patient presents with     Rash     Patient presents today with rash, itchy . Symptoms started two weeks ago. Pt thinks she has poison oak. Arrived to urgent care ambulatory.       HPI  Kari Velasquez is a 18 year old female who presents to urgent care with concern over pruritic rash on her feet which began approximately 2 weeks ago.  Patient believes it is secondary to contact dermatitis from plant exposure after it developed immediately after she was in the [place] and walking through the woods barefoot.  It was initially a spot on dorsal surface of right foot, however has subsequently developed lesions on plantar surface of left foot and on her toes. She describes rash as pruritic.  No significant pain or burning.  No other symptoms.  She denies any fever, chills, allergies, cough, chest pain, dyspnea, wheezing, nausea, vomiting, diarrhea or abdominal complaints.  No other new exposures.  No recent changes in soaps, detergents, lotions, foods, medications, insect bites or stings.  She did attempt to treat with single dose of topical antifungal agent without relief.      Allergies:  No Known Allergies    Problem List:    There are no problems to display for this patient.     Past Medical History:    History reviewed. No pertinent past medical history.    Past Surgical History:    History reviewed. No pertinent surgical history.    Family History:    History reviewed. No pertinent family history.    Social History:  Marital Status:  Single [1]  Social History     Tobacco Use     Smoking status: Never Smoker     Smokeless tobacco: Never Used        Medications:    triamcinolone (KENALOG) 0.1 % external cream  EPINEPHrine (EPIPEN 2-RITA) 0.3 MG/0.3ML injection 2-pack  norgestim-eth estrad triphasic (ORTHO TRI-CYCLEN) 0.18/0.215/0.25 MG-35 MCG tablet      Review of Systems  CONSTITUTIONAL:NEGATIVE for fever, chills, change in weight  INTEGUMENTARY/SKIN: POSITIVE for rash on feet  Virtual Regular Visit    Assessment/Plan:    Problem List Items Addressed This Visit     Hypothyroidism     Cont l-thyroxine at current dose  Cont to monitor periodically and adjust med as needed         Hyperlipidemia - Primary     Maintain euthyroid  Cont statin and low chol diet  Relevant Medications    rosuvastatin (CRESTOR) 5 mg tablet               Reason for visit is   Chief Complaint   Patient presents with    Results     lab    Virtual Regular Visit        Encounter provider Ramakrishna Ibrahim MD    Provider located at [address] [place] [id number]      Recent Visits  No visits were found meeting these conditions  Showing recent visits within past 7 days and meeting all other requirements     Future Appointments  No visits were found meeting these conditions  Showing future appointments within next 150 days and meeting all other requirements        The patient was identified by name and date of birth  Soumya Doss was informed that this is a telemedicine visit and that the visit is being conducted through [facility] and patient was informed that this is a secure, HIPAA-compliant platform  She agrees to proceed     My office door was closed  No one else was in the room  She acknowledged consent and understanding of privacy and security of the video platform  The patient has agreed to participate and understands they can discontinue the visit at any time  Patient is aware this is a billable service  Subjective  Soumya Doss is a 78 y o  female    HPI   Follow-up to review labs  Lipids elevated  TSH w/i range but towards underactive side  CMP/CBC/A/L wnl  Has upcoming cardio appt  --[facility]      Past Medical History:   Diagnosis Date    Endometriosis     last assessed 07/17/2017    MVP (mitral valve prolapse) 1980       Past Surgical History:   Procedure Laterality Date    HYSTERECTOMY  01/17/1993       Current Outpatient Medications   Medication Sig Dispense Refill    aspirin 81 mg chewable tablet Chew daily      calcium carbonate 1250 MG capsule Take 1,250 mg by mouth 2 (two) times a day with meals      estradiol (VAGIFEM) 10 MCG TABS vaginal tablet Insert into the vagina      levothyroxine 25 mcg tablet TAKE 1 TABLET DAILY 90 tablet 1    tretinoin (RETIN-A) 0 025 % cream APPLY AT NIGHT AS DIRECTED  1    valACYclovir (VALTREX) 500 mg tablet Use as directed prn 30 tablet 3    rosuvastatin (CRESTOR) 5 mg tablet Take 1 tablet (5 mg total) by mouth daily 90 tablet 1     No current facility-administered medications for this visit  No Known Allergies    Review of Systems   Constitutional: Negative  Respiratory: Negative  Cardiovascular: Positive for palpitations  Endocrine:        Thyroid d/o   Musculoskeletal: Positive for arthralgias  Allergic/Immunologic: Positive for environmental allergies  Neurological: Negative  Psychiatric/Behavioral: Positive for sleep disturbance  Video Exam    Vitals:    08/31/20 0842   Weight: 69 7 kg (153 lb 9 6 oz)   Height: 5' 6 25" (1 683 m)       Physical Exam   AAOx3  NAD  I spent 15 minutes directly with the patient during this visit      [address] [place] [address] acknowledges that she has consented to an online visit or consultation  She understands that the online visit is based solely on information provided by her, and that, in the absence of a face-to-face physical evaluation by the physician, the diagnosis she receives is both limited and provisional in terms of accuracy and completeness  This is not intended to replace a full medical face-to-face evaluation by the physician  Aliyah Jauregui understands and accepts these terms  bilaterally   EYES: NEGATIVE for vision changes or irritation  ENT/MOUTH: NEGATIVE for ear, mouth and throat problems  RESP:NEGATIVE for significant cough or SOB  GI: NEGATIVE for nausea, abdominal pain, heartburn, or change in bowel habits  Physical Exam   BP: 113/74  Pulse: 95  Temp: 97.7  F (36.5  C)  SpO2: 100 %  Physical Exam  GENERAL APPEARANCE: healthy, alert and no distress  EYES: EOMI,  PERRL, conjunctiva clear  RESP: lungs clear to auscultation - no rales, rhonchi or wheezes  CV: regular rates and rhythm, normal S1 S2, no murmur noted  SKIN: 1-2 cm vesicular plaques on the plantar surface of her left foot, dorsal surface of the right foot as well as erythema, vesicles and skin peeling between her toes  ED Course           Procedures       Critical Care time:  none        No results found for this or any previous visit (from the past 24 hour(s)).  Medications - No data to display    Assessments & Plan (with Medical Decision Making)     I have reviewed the nursing notes.  I have reviewed the findings, diagnosis, plan and need for follow up with the patient.     New Prescriptions    TRIAMCINOLONE (KENALOG) 0.1 % EXTERNAL CREAM    Apply topically 2 times daily       Final diagnoses:   Contact dermatitis due to plant     18-year-old female presents to urgent care with concern of a rash on her feet bilaterally that initially developed after she was walking through the woods barefoot on the dorsal surface of her right foot however subsequently did spread to plantar surface and in between toes of feet bilaterally.  She had stable vital signs upon arrival.  Physical exam findings were significant for vesicular plaques and vesicular lesions on her feet bilaterally.  Given risk factor would consider contact dermatitis due to plant differential would also highly favor athlete's foot.  She was discharged home stable with prescription for triamcinolone cream apply topically 2-3 times daily.  If symptoms fail to improve  recommend initiating OTC antifungal treatment.  Follow-up if no improvement within the next week.  Worrisome reasons to return to the ER/UC sooner discussed.    Disclaimer: This note consists of symbols derived from keyboarding, dictation, and/or voice recognition software. As a result, there may be errors in the script that have gone undetected.  Please consider this when interpreting information found in the chart.      9/7/2022   [facility] EMERGENCY DEPT     Annamarie Castro PA-C  09/09/22 1138

## 2022-09-12 ENCOUNTER — TELEPHONE (OUTPATIENT)
Dept: FAMILY MEDICINE CLINIC | Facility: CLINIC | Age: 72
End: 2022-09-12

## 2022-09-12 ENCOUNTER — PATIENT MESSAGE (OUTPATIENT)
Dept: FAMILY MEDICINE CLINIC | Facility: CLINIC | Age: 72
End: 2022-09-12

## 2022-09-12 NOTE — TELEPHONE ENCOUNTER
----- Message from Stacey Coelho sent at 9/12/2022  8:34 AM EDT -----  Regarding: Omicron booster  Good morning! Will the omicron boosters be available soon? Id like to get on before traveling October 1   Thanks, [employer]

## 2022-09-12 NOTE — TELEPHONE ENCOUNTER
I called and spoke with pt to let her know the booster should be out this fall not sure exactly when

## 2022-10-25 PROBLEM — Z00.00 MEDICARE ANNUAL WELLNESS VISIT, SUBSEQUENT: Status: RESOLVED | Noted: 2020-09-07 | Resolved: 2022-10-25

## 2022-12-14 DIAGNOSIS — E78.5 HYPERLIPIDEMIA, UNSPECIFIED HYPERLIPIDEMIA TYPE: ICD-10-CM

## 2022-12-14 DIAGNOSIS — E03.9 HYPOTHYROIDISM, UNSPECIFIED TYPE: ICD-10-CM

## 2022-12-14 RX ORDER — ROSUVASTATIN CALCIUM 5 MG/1
TABLET, COATED ORAL
Qty: 90 TABLET | Refills: 1 | Status: SHIPPED | OUTPATIENT
Start: 2022-12-14

## 2022-12-14 RX ORDER — LEVOTHYROXINE SODIUM 0.05 MG/1
TABLET ORAL
Qty: 90 TABLET | Refills: 1 | Status: SHIPPED | OUTPATIENT
Start: 2022-12-14

## 2023-05-02 DIAGNOSIS — E78.5 HYPERLIPIDEMIA, UNSPECIFIED HYPERLIPIDEMIA TYPE: ICD-10-CM

## 2023-05-02 DIAGNOSIS — E03.9 HYPOTHYROIDISM, UNSPECIFIED TYPE: ICD-10-CM

## 2023-05-02 RX ORDER — ROSUVASTATIN CALCIUM 5 MG/1
TABLET, COATED ORAL
Qty: 90 TABLET | Refills: 1 | Status: SHIPPED | OUTPATIENT
Start: 2023-05-02

## 2023-05-02 RX ORDER — LEVOTHYROXINE SODIUM 0.05 MG/1
TABLET ORAL
Qty: 90 TABLET | Refills: 1 | Status: SHIPPED | OUTPATIENT
Start: 2023-05-02

## 2023-06-03 ENCOUNTER — TELEPHONE (OUTPATIENT)
Dept: FAMILY MEDICINE CLINIC | Facility: CLINIC | Age: 73
End: 2023-06-03

## 2023-06-03 DIAGNOSIS — Z12.31 ENCOUNTER FOR SCREENING MAMMOGRAM FOR MALIGNANT NEOPLASM OF BREAST: Primary | ICD-10-CM

## 2023-06-03 NOTE — TELEPHONE ENCOUNTER
----- Message from Brandy Coelho sent at 6/3/2023  9:36 AM EDT -----  Regardin month mammogram   Contact: [phone]  Me again  I’m not able to print the script  the only option given is to schedule with [facility]   [facility] is not part of [facility]     Thanks

## 2023-06-14 DIAGNOSIS — Z12.31 ENCOUNTER FOR SCREENING MAMMOGRAM FOR MALIGNANT NEOPLASM OF BREAST: ICD-10-CM

## 2023-08-22 ENCOUNTER — RA CDI HCC (OUTPATIENT)
Dept: OTHER | Facility: HOSPITAL | Age: 73
End: 2023-08-22

## 2023-08-22 NOTE — PROGRESS NOTES
[facility] coding opportunities       Chart reviewed, no opportunity found: [address]        Patients Insurance     Medicare Insurance: [employer] Advantage

## 2023-08-28 ENCOUNTER — OFFICE VISIT (OUTPATIENT)
Dept: FAMILY MEDICINE CLINIC | Facility: CLINIC | Age: 73
End: 2023-08-28
Payer: COMMERCIAL

## 2023-08-28 ENCOUNTER — TELEPHONE (OUTPATIENT)
Dept: ADMINISTRATIVE | Facility: OTHER | Age: 73
End: 2023-08-28

## 2023-08-28 VITALS
RESPIRATION RATE: 16 BRPM | OXYGEN SATURATION: 96 % | DIASTOLIC BLOOD PRESSURE: 88 MMHG | WEIGHT: 153.6 LBS | SYSTOLIC BLOOD PRESSURE: 138 MMHG | HEART RATE: 64 BPM | BODY MASS INDEX: 24.68 KG/M2 | HEIGHT: 66 IN | TEMPERATURE: 98.4 F

## 2023-08-28 DIAGNOSIS — Z78.0 POSTMENOPAUSE: ICD-10-CM

## 2023-08-28 DIAGNOSIS — Z00.00 MEDICARE ANNUAL WELLNESS VISIT, SUBSEQUENT: Primary | ICD-10-CM

## 2023-08-28 DIAGNOSIS — E78.5 HYPERLIPIDEMIA, UNSPECIFIED HYPERLIPIDEMIA TYPE: ICD-10-CM

## 2023-08-28 DIAGNOSIS — E03.9 HYPOTHYROIDISM, UNSPECIFIED TYPE: ICD-10-CM

## 2023-08-28 DIAGNOSIS — Z23 NEED FOR PROPHYLACTIC VACCINATION AGAINST POLIOMYELITIS USING INACTIVATED POLIOVIRUS VACCINE (IPV): ICD-10-CM

## 2023-08-28 DIAGNOSIS — Z13.0 SCREENING FOR DEFICIENCY ANEMIA: ICD-10-CM

## 2023-08-28 DIAGNOSIS — G47.00 INSOMNIA, UNSPECIFIED TYPE: ICD-10-CM

## 2023-08-28 PROBLEM — Z78.9 RECENT FOREIGN TRAVEL: Status: ACTIVE | Noted: 2023-08-28

## 2023-08-28 PROCEDURE — 90713 POLIOVIRUS IPV SC/IM: CPT | Performed by: FAMILY MEDICINE

## 2023-08-28 PROCEDURE — 90471 IMMUNIZATION ADMIN: CPT | Performed by: FAMILY MEDICINE

## 2023-08-28 PROCEDURE — 99214 OFFICE O/P EST MOD 30 MIN: CPT | Performed by: FAMILY MEDICINE

## 2023-08-28 PROCEDURE — G0439 PPPS, SUBSEQ VISIT: HCPCS | Performed by: FAMILY MEDICINE

## 2023-08-28 RX ORDER — HYALURONATE SODIUM 10 MG/ML
SYRINGE (ML) INTRAARTICULAR
COMMUNITY
Start: 2023-08-02

## 2023-08-28 RX ORDER — ZOLPIDEM TARTRATE 10 MG/1
10 TABLET ORAL
Qty: 10 TABLET | Refills: 0 | Status: SHIPPED | OUTPATIENT
Start: 2023-08-28

## 2023-08-28 NOTE — TELEPHONE ENCOUNTER
----- Message from Kiet Arteaga sent at 8/28/2023 10:17 AM EDT -----  Regarding: mammo  08/28/23 10:17 AM    Hello, our patient attached above has had mammo completed/performed.  Please assist in updating the patient chart by juno in [place]  The date of service is 2024    Thank you,  Verónica Parks  [facility]

## 2023-08-28 NOTE — PATIENT INSTRUCTIONS
Medicare Preventive Visit Patient Instructions  Thank you for completing your Welcome to Medicare Visit or Medicare Annual Wellness Visit today. Your next wellness visit will be due in one year (8/28/2024). The screening/preventive services that you may require over the next 5-10 years are detailed below. Some tests may not apply to you based off risk factors and/or age. Screening tests ordered at today's visit but not completed yet may show as past due. Also, please note that scanned in results may not display below. Preventive Screenings:  Service Recommendations Previous Testing/Comments   Colorectal Cancer Screening  * Colonoscopy    * Fecal Occult Blood Test (FOBT)/Fecal Immunochemical Test (FIT)  * Fecal DNA/Cologuard Test  * Flexible Sigmoidoscopy Age: 43-73 years old   Colonoscopy: every 10 years (may be performed more frequently if at higher risk)  OR  FOBT/FIT: every 1 year  OR  Cologuard: every 3 years  OR  Sigmoidoscopy: every 5 years  Screening may be recommended earlier than age 39 if at higher risk for colorectal cancer. Also, an individualized decision between you and your healthcare provider will decide whether screening between the ages of 77-80 would be appropriate. Colonoscopy: 11/27/2017  FOBT/FIT: Not on file  Cologuard: Not on file  Sigmoidoscopy: Not on file    Screening Current     Breast Cancer Screening Age: 36 years old  Frequency: every 1-2 years  Not required if history of left and right mastectomy Mammogram: 06/12/2023    Screening Current   Cervical Cancer Screening Between the ages of 21-29, pap smear recommended once every 3 years. Between the ages of 32-69, can perform pap smear with HPV co-testing every 5 years.    Recommendations may differ for women with a history of total hysterectomy, cervical cancer, or abnormal pap smears in past. Pap Smear: Not on file    Screening Not Indicated   Hepatitis C Screening Once for adults born between 1945 and 1965  More frequently in patients at high risk for Hepatitis C Hep C Antibody: 07/23/2018    Screening Current   Diabetes Screening 1-2 times per year if you're at risk for diabetes or have pre-diabetes Fasting glucose: 95 mg/dL (8/30/2022)  A1C: No results in last 5 years (No results in last 5 years)  Screening Current   Cholesterol Screening Once every 5 years if you don't have a lipid disorder. May order more often based on risk factors. Lipid panel: 08/30/2022    Screening Not Indicated  History Lipid Disorder     Other Preventive Screenings Covered by Medicare:  1. Abdominal Aortic Aneurysm (AAA) Screening: covered once if your at risk. You're considered to be at risk if you have a family history of AAA. 2. Lung Cancer Screening: covers low dose CT scan once per year if you meet all of the following conditions: (1) Age 48-67; (2) No signs or symptoms of lung cancer; (3) Current smoker or have quit smoking within the last 15 years; (4) You have a tobacco smoking history of at least 20 pack years (packs per day multiplied by number of years you smoked); (5) You get a written order from a healthcare provider. 3. Glaucoma Screening: covered annually if you're considered high risk: (1) You have diabetes OR (2) Family history of glaucoma OR (3)  aged 48 and older OR (3)  American aged 72 and older  3. Osteoporosis Screening: covered every 2 years if you meet one of the following conditions: (1) You're estrogen deficient and at risk for osteoporosis based off medical history and other findings; (2) Have a vertebral abnormality; (3) On glucocorticoid therapy for more than 3 months; (4) Have primary hyperparathyroidism; (5) On osteoporosis medications and need to assess response to drug therapy. · Last bone density test (DXA Scan): 09/13/2021.  5. HIV Screening: covered annually if you're between the age of 15-65. Also covered annually if you are younger than 13 and older than 72 with risk factors for HIV infection. For pregnant patients, it is covered up to 3 times per pregnancy. Immunizations:  Immunization Recommendations   Influenza Vaccine Annual influenza vaccination during flu season is recommended for all persons aged >= 6 months who do not have contraindications   Pneumococcal Vaccine   * Pneumococcal conjugate vaccine = PCV13 (Prevnar 13), PCV15 (Vaxneuvance), PCV20 (Prevnar 20)  * Pneumococcal polysaccharide vaccine = PPSV23 (Pneumovax) Adults 20-63 years old: 1-3 doses may be recommended based on certain risk factors  Adults 72 years old: 1-2 doses may be recommended based off what pneumonia vaccine you previously received   Hepatitis B Vaccine 3 dose series if at intermediate or high risk (ex: diabetes, end stage renal disease, liver disease)   Tetanus (Td) Vaccine - COST NOT COVERED BY MEDICARE PART B Following completion of primary series, a booster dose should be given every 10 years to maintain immunity against tetanus. Td may also be given as tetanus wound prophylaxis. Tdap Vaccine - COST NOT COVERED BY MEDICARE PART B Recommended at least once for all adults. For pregnant patients, recommended with each pregnancy. Shingles Vaccine (Shingrix) - COST NOT COVERED BY MEDICARE PART B  2 shot series recommended in those aged 48 and above     Health Maintenance Due:      Topic Date Due   • DXA SCAN  09/13/2023   • Breast Cancer Screening: Mammogram  06/12/2024   • Colorectal Cancer Screening  11/27/2027   • Hepatitis C Screening  Completed     Immunizations Due:      Topic Date Due   • COVID-19 Vaccine (7 - Moderna series) 01/14/2023   • Influenza Vaccine (1) 09/01/2023     Advance Directives   What are advance directives? Advance directives are legal documents that state your wishes and plans for medical care. These plans are made ahead of time in case you lose your ability to make decisions for yourself.  Advance directives can apply to any medical decision, such as the treatments you want, and if you want to donate organs. What are the types of advance directives? There are many types of advance directives, and each state has rules about how to use them. You may choose a combination of any of the following:  · Living will: This is a written record of the treatment you want. You can also choose which treatments you do not want, which to limit, and which to stop at a certain time. This includes surgery, medicine, IV fluid, and tube feedings. · Durable power of  for healthcare [facility] [place]): This is a written record that states who you want to make healthcare choices for you when you are unable to make them for yourself. This person, called a proxy, is usually a family member or a friend. You may choose more than 1 proxy. · Do not resuscitate (DNR) order:  A DNR order is used in case your heart stops beating or you stop breathing. It is a request not to have certain forms of treatment, such as CPR. A DNR order may be included in other types of advance directives. · Medical directive: This covers the care that you want if you are in a coma, near death, or unable to make decisions for yourself. You can list the treatments you want for each condition. Treatment may include pain medicine, surgery, blood transfusions, dialysis, IV or tube feedings, and a ventilator (breathing machine). · Values history: This document has questions about your views, beliefs, and how you feel and think about life. This information can help others choose the care that you would choose. Why are advance directives important? An advance directive helps you control your care. Although spoken wishes may be used, it is better to have your wishes written down. Spoken wishes can be misunderstood, or not followed. Treatments may be given even if you do not want them. An advance directive may make it easier for your family to make difficult choices about your care. Alcohol Use and Your Health    Drinking too much can harm your health. Excessive alcohol use leads to about 88,000 death in [facility] each year, and shortens the life of those who diet by almost 30 years. Further, excessive drinking cost the economy $249 billion in 2010. Most excessive drinkers are not alcohol dependent. Excessive alcohol use has immediate effects that increase the risk of many harmful health conditions. These are most often the result of binge drinking. Over time, excessive alcohol use can lead to the development of chronic diseases and other series health problems. What is considered a "drink"? Excessive alcohol use includes:  · Binge Drinking: For women, 4 or more drinks consumed on one occasion. For men, 5 or more drinks consumed on one occasion. · Heavy Drinking: For women, 8 or more drinks per week. For men, 15 or more drinks per week  · Any alcohol used by pregnant women  · Any alcohol used by those under the age of 21 years    If you choose to drink, do so in moderation:  · Do not drink at all if you are under the age of 24, or if you are or may be pregnant, or have health problems that could be made worse by drinking.   · For women, up to 1 drink per day  · For men, up to 2 drinks a day    No one should begin drinking or drink more frequently based on potential health benefits    Short-Term Health Risks:  · Injuries: motor vehicle crashes, falls, drownings, burns  · Violence: homicide, suicide, sexual assault, intimate partner violence  · Alcohol poisoning  · Reproductive health: risky sexual behaviors, unintended prengnacy, sexually transmitted diseases, miscarriage, stillbirth, fetal alcohol syndrome    Long-Term Health Risks:  · Chronic diseases: high blood pressure, heart disease, stroke, liver disease, digestive problems  · Cancers: breast, mouth and throat, liver, colon  · Learning and memory problems: dementia, poor school performance  · Mental health: depression, anxiety, insomnia  · Social problems: lost productivity, family problems, unemployment  · Alcohol dependence    For support and more information:  · Substance Abuse and [facility] , [address] [place] [address]  Web Address: https://[employer]/    · Alcoholics Anonymous        Web Address: http://www.dan.info/    https://www.cdc.gov/alcohol/fact-sheets/alcohol-use.htm     © [employer] 2018 Information is for End User's use only and may not be sold, redistributed or otherwise used for commercial purposes.  All illustrations and images included in CareNotes® are the copyrighted property of [employer]D.A[employer] Inc. or [address] [place] [address]

## 2023-08-28 NOTE — PROGRESS NOTES
Assessment and Plan:     Problem List Items Addressed This Visit     BMI 24.0-24.9, adult    Hyperlipidemia    Relevant Orders    Lipase    Comprehensive metabolic panel    Lipid Panel with Direct LDL reflex    Hypothyroidism    Relevant Orders    TSH, 3rd generation    Insomnia    Relevant Medications    zolpidem (AMBIEN) 10 mg tablet    Need for polio vaccination    Postmenopause    Relevant Orders    Ambulatory Referral to Gynecology    Screening for deficiency anemia - Primary    Relevant Orders    CBC        Preventive health issues were discussed with patient, and age appropriate screening tests were ordered as noted in patient's After Visit Summary. Personalized health advice and appropriate referrals for health education or preventive services given if needed, as noted in patient's After Visit Summary. History of Present Illness:     Patient presents for a Medicare Wellness Visit and follow-up    HPI   Patient Care Team:  Mercedes Garcia MD as PCP - General  Ruslan Ko MD    Follow-up  Interval hx reviewed  Will be traveling to [place] [facility] this Fall--requests poilo vacc(IPV)--[facility]  Requests rx for small supply of sleep medicine to help w lengthy flight to [place] [facility]  Overall feels well  BP top normal  Had b/l knee injections completed thru ortho-Dr. Patricia Skinner good response  Due for labs     Review of Systems:     Review of Systems   Constitutional: Negative. Respiratory: Negative. Cardiovascular: Negative. Skin: Negative. Neurological: Negative.          Problem List:     Patient Active Problem List   Diagnosis   • Arthritis   • Heart murmur   • Hypothyroidism   • Mitral valve prolapse   • Thyroid cyst   • Osteopenia   • Hyperlipidemia   • Palpitations   • Screening for deficiency anemia   • Postmenopause   • Osteoarthritis of both knees   • BMI 24.0-24.9, adult   • Breast lipoma   • Dysuria   • Insomnia   • Need for polio vaccination      Past Medical and Surgical History:     Past Medical History:   Diagnosis Date   • Endometriosis     last assessed 07/17/2017   • MVP (mitral valve prolapse) 1980     Past Surgical History:   Procedure Laterality Date   • HYSTERECTOMY  01/17/1993      Family History:     Family History   Problem Relation Age of Onset   • Stroke Mother         CVA   • Other Mother         cardiac disorder   • Other Father         cardiac disorder      Social History:     Social History     Socioeconomic History   • Marital status: /Civil Union     Spouse name: None   • Number of children: None   • Years of education: None   • Highest education level: None   Occupational History   • None   Tobacco Use   • Smoking status: Former   • Smokeless tobacco: Never   Vaping Use   • Vaping Use: Never used   Substance and Sexual Activity   • Alcohol use: Yes     Comment: 4x weekly glass of wine   • Drug use: No   • Sexual activity: Never   Other Topics Concern   • None   Social History Narrative   • None     Social Determinants of Health     Financial Resource Strain: Low Risk  (8/21/2023)    Overall Financial Resource Strain (CARDIA)    • Difficulty of Paying Living Expenses: Not hard at all   Food Insecurity: Not on file   Transportation Needs: No Transportation Needs (8/21/2023)    PRAPARE - Transportation    • Lack of Transportation (Medical): No    • Lack of Transportation (Non-Medical):  No   Physical Activity: Not on file   Stress: Not on file   Social Connections: Not on file   Intimate Partner Violence: Not on file   Housing Stability: Not on file      Medications and Allergies:     Current Outpatient Medications   Medication Sig Dispense Refill   • aspirin 81 mg chewable tablet Chew daily     • calcium carbonate 1250 MG capsule Take 1,250 mg by mouth 2 (two) times a day with meals     • estradiol (VAGIFEM, YUVAFEM) 10 MCG TABS vaginal tablet Insert 1 tablet into the vagina     • levothyroxine 50 mcg tablet TAKE 1 TABLET DAILY IN THE EARLY MORNING 90 tablet 1   • metoprolol succinate (TOPROL-XL) 25 mg 24 hr tablet Take 25 mg by mouth daily     • Multiple Vitamin (MULTIVITAMIN ADULT PO) Take 1 capsule by mouth     • omega-3-acid ethyl esters (LOVAZA) 1 g capsule Take 2 g by mouth     • rosuvastatin (CRESTOR) 5 mg tablet TAKE 1 TABLET DAILY 90 tablet 1   • zolpidem (AMBIEN) 10 mg tablet Take 1 tablet (10 mg total) by mouth daily at bedtime as needed for sleep 10 tablet 0   • Hepatitis A-Hep B Recomb Vac (TWINRIX IM)  (Patient not taking: Reported on 8/28/2023)     • measles-mumps-rubella (M-M-R II)  (Patient not taking: Reported on 8/28/2023)     • Meningococcal B Vac, Recomb, (TRUMENBA IM)  (Patient not taking: Reported on 8/28/2023)     • TriVisc injection INJECT 1 SYRINGE WEEKLY INTO THE KNEE (Patient not taking: Reported on 8/28/2023)     • valACYclovir (VALTREX) 500 mg tablet TAKE AS NEEDED AS DIRECTED 30 tablet 3     No current facility-administered medications for this visit.      No Known Allergies   Immunizations:     Immunization History   Administered Date(s) Administered   • COVID-19 MODERNA VACC 0.25 ML IM BOOSTER 01/21/2021   • COVID-19 MODERNA VACC 0.5 ML IM 01/21/2021, 03/02/2021, 03/02/2021, 09/20/2021, 03/30/2022, 03/30/2022, 09/14/2022   • Hepatitis A 07/03/2023   • Hepatitis B 07/03/2023   • INFLUENZA 09/16/2020, 10/28/2021, 10/28/2021   • IPV 08/28/2023   • Influenza, high dose seasonal 0.7 mL 09/16/2020   • Influenza, seasonal, injectable 09/21/2010, 11/12/2012, 10/17/2016, 08/30/2017   • MMR 07/03/2023   • Meningococcal ACWY, unspecified 07/03/2023   • Pneumococcal Conjugate 13-Valent 07/17/2017   • Pneumococcal Polysaccharide PPV23 08/17/2020, 08/17/2020   • Tdap 12/14/2017   • Tuberculin Skin Test-PPD Intradermal 09/21/2010      Health Maintenance:         Topic Date Due   • DXA SCAN  09/13/2023   • Breast Cancer Screening: Mammogram  06/12/2024   • Colorectal Cancer Screening  11/27/2027   • Hepatitis C Screening  Completed Topic Date Due   • COVID-19 Vaccine (7 - Moderna series) 01/14/2023   • Influenza Vaccine (1) 09/01/2023      Medicare Screening Tests and Risk Assessments:     Stephany Schilling is here for her Subsequent Wellness visit. Last Medicare Wellness visit information reviewed, patient interviewed and updates made to the record today. Health Risk Assessment:   Patient rates overall health as very good. Patient feels that their physical health rating is same. Patient is very satisfied with their life. Eyesight was rated as same. Hearing was rated as same. Patient feels that their emotional and mental health rating is same. Patients states they are never, rarely angry. Patient states they are sometimes unusually tired/fatigued. Pain experienced in the last 7 days has been some. Patient's pain rating has been 3/10. Patient states that she has experienced no weight loss or gain in last 6 months. Depression Screening:   PHQ-2 Score: 0      Fall Risk Screening: In the past year, patient has experienced: no history of falling in past year      Urinary Incontinence Screening:   Patient has not leaked urine accidently in the last six months. Home Safety:  Patient does not have trouble with stairs inside or outside of their home. Patient has working smoke alarms and has working carbon monoxide detector. Home safety hazards include: none. Nutrition:   Current diet is Regular. Medications:   Patient is not currently taking any over-the-counter supplements. Patient is able to manage medications. Activities of Daily Living (ADLs)/Instrumental Activities of Daily Living (IADLs):   Walk and transfer into and out of bed and chair?: Yes  Dress and groom yourself?: Yes    Bathe or shower yourself?: Yes    Feed yourself?  Yes  Do your laundry/housekeeping?: Yes  Manage your money, pay your bills and track your expenses?: Yes  Make your own meals?: Yes    Do your own shopping?: Yes    Previous Hospitalizations:   Any hospitalizations or ED visits within the last 12 months?: No      Advance Care Planning:   Living will: Yes    Durable POA for healthcare: Yes    Advanced directive: Yes    Advanced directive counseling given: Yes    Five wishes given: Yes      Cognitive Screening:   Provider or family/friend/caregiver concerned regarding cognition?: No    PREVENTIVE SCREENINGS      Cardiovascular Screening:    General: History Lipid Disorder and Screening Current      Diabetes Screening:     General: Screening Current      Colorectal Cancer Screening:     General: Screening Current      Breast Cancer Screening:     General: Screening Current      Cervical Cancer Screening:    General: Screening Not Indicated      Osteoporosis Screening:    General: Screening Current      Abdominal Aortic Aneurysm (AAA) Screening:        General: Screening Not Indicated      Lung Cancer Screening:     General: Screening Not Indicated      Hepatitis C Screening:    General: Screening Current    Screening, Brief Intervention, and Referral to Treatment (SBIRT)    Screening  Typical number of drinks in a day: 1  Typical number of drinks in a week: 3  Interpretation: Low risk drinking behavior. AUDIT-C Screenin) How often did you have a drink containing alcohol in the past year? 2 to 3 times a week  2) How many drinks did you have on a typical day when you were drinking in the past year? 1 to 2  3) How often did you have 6 or more drinks on one occasion in the past year? never    AUDIT-C Score: 3  Interpretation: Score 3-12 (female): POSITIVE screen for alcohol misuse    AUDIT Screenin) How often during the last year have you found that you were not able to stop drinking once you had started?  0 - never  5) How often during the last year have you failed to do what was normally expected from you because of drinking? 0 - never  6) How often during the last year have you needed a first drink in the morning to get yourself going after a heavy drinking session? 0 - never  7) How often during the last year have you had a feeling of guilt or remorse after drinking? 0 - never  8) How often during the last year have you been unable to remember what happened the night before because you had been drinking? 0 - never  9) Have you or someone else been injured as a result of your drinking? 0 - no  10) Has a relative or friend or a doctor or another health worker been concerned about your drinking or suggested you cut down? 0 - no    AUDIT Score: 3  Interpretation: Low risk alcohol consumption    Single Item Drug Screening:  How often have you used an illegal drug (including marijuana) or a prescription medication for non-medical reasons in the past year? never    Single Item Drug Screen Score: 0  Interpretation: Negative screen for possible drug use disorder    Brief Intervention  Alcohol & drug use screenings were reviewed. No concerns regarding substance use disorder identified. Other Counseling Topics:   Car/seat belt/driving safety, skin self-exam, sunscreen and regular weightbearing exercise and calcium and vitamin D intake. No results found. Physical Exam:     /88 (BP Location: Left arm, Patient Position: Sitting, Cuff Size: Standard)   Pulse 64   Temp 98.4 °F (36.9 °C)   Resp 16   Ht 5' 6" (1.676 m)   Wt 69.7 kg (153 lb 9.6 oz)   SpO2 96%   BMI 24.79 kg/m²     Physical Exam  Vitals and nursing note reviewed. Constitutional:       General: She is not in acute distress. Appearance: Normal appearance. Eyes:      General: No scleral icterus. Conjunctiva/sclera: Conjunctivae normal.   Cardiovascular:      Rate and Rhythm: Normal rate and regular rhythm. Pulmonary:      Effort: Pulmonary effort is normal. No respiratory distress. Breath sounds: Normal breath sounds. Abdominal:      General: Bowel sounds are normal.      Palpations: Abdomen is soft. Tenderness: There is no abdominal tenderness.    Musculoskeletal: General: Normal range of motion. Cervical back: Neck supple. Right lower leg: No edema. Left lower leg: No edema. Skin:     General: Skin is warm and dry. Coloration: Skin is not jaundiced. Findings: No erythema. Neurological:      General: No focal deficit present. Mental Status: She is alert and oriented to person, place, and time. Cranial Nerves: No cranial nerve deficit.    Psychiatric:         Mood and Affect: Mood normal.          Tisha Stephen MD

## 2023-08-29 NOTE — TELEPHONE ENCOUNTER
Upon review of the In Basket request we were able to note that no further action is required. The patient chart is up to date as a result of a previous request.      Any additional questions or concerns should be emailed to the Practice Liaisons via the appropriate education email address, please do not reply via In Basket.     Thank you  Hamzah Sanders

## 2023-08-30 DIAGNOSIS — Z78.0 POSTMENOPAUSAL: Primary | ICD-10-CM

## 2023-08-30 PROBLEM — Z23 NEED FOR POLIO VACCINATION: Status: ACTIVE | Noted: 2023-08-28

## 2023-09-01 ENCOUNTER — APPOINTMENT (OUTPATIENT)
Dept: LAB | Facility: CLINIC | Age: 73
End: 2023-09-01
Payer: COMMERCIAL

## 2023-09-01 DIAGNOSIS — Z13.0 SCREENING FOR DEFICIENCY ANEMIA: ICD-10-CM

## 2023-09-01 DIAGNOSIS — E03.9 HYPOTHYROIDISM, UNSPECIFIED TYPE: ICD-10-CM

## 2023-09-01 DIAGNOSIS — E78.5 HYPERLIPIDEMIA, UNSPECIFIED HYPERLIPIDEMIA TYPE: ICD-10-CM

## 2023-09-01 LAB
ALBUMIN SERPL BCP-MCNC: 4.2 G/DL (ref 3.5–5)
ALP SERPL-CCNC: 73 U/L (ref 34–104)
ALT SERPL W P-5'-P-CCNC: 30 U/L (ref 7–52)
ANION GAP SERPL CALCULATED.3IONS-SCNC: 10 MMOL/L
AST SERPL W P-5'-P-CCNC: 31 U/L (ref 13–39)
BILIRUB SERPL-MCNC: 0.69 MG/DL (ref 0.2–1)
BUN SERPL-MCNC: 21 MG/DL (ref 5–25)
CALCIUM SERPL-MCNC: 10.4 MG/DL (ref 8.4–10.2)
CHLORIDE SERPL-SCNC: 104 MMOL/L (ref 96–108)
CHOLEST SERPL-MCNC: 188 MG/DL
CO2 SERPL-SCNC: 27 MMOL/L (ref 21–32)
CREAT SERPL-MCNC: 0.82 MG/DL (ref 0.6–1.3)
ERYTHROCYTE [DISTWIDTH] IN BLOOD BY AUTOMATED COUNT: 12.7 % (ref 11.6–15.1)
GFR SERPL CREATININE-BSD FRML MDRD: 72 ML/MIN/1.73SQ M
GLUCOSE P FAST SERPL-MCNC: 91 MG/DL (ref 65–99)
HCT VFR BLD AUTO: 43.6 % (ref 34.8–46.1)
HDLC SERPL-MCNC: 70 MG/DL
HGB BLD-MCNC: 14.7 G/DL (ref 11.5–15.4)
LDLC SERPL CALC-MCNC: 95 MG/DL (ref 0–100)
LIPASE SERPL-CCNC: 35 U/L (ref 11–82)
MCH RBC QN AUTO: 32.1 PG (ref 26.8–34.3)
MCHC RBC AUTO-ENTMCNC: 33.7 G/DL (ref 31.4–37.4)
MCV RBC AUTO: 95 FL (ref 82–98)
PLATELET # BLD AUTO: 327 THOUSANDS/UL (ref 149–390)
PMV BLD AUTO: 12.9 FL (ref 8.9–12.7)
POTASSIUM SERPL-SCNC: 4.5 MMOL/L (ref 3.5–5.3)
PROT SERPL-MCNC: 6.9 G/DL (ref 6.4–8.4)
RBC # BLD AUTO: 4.58 MILLION/UL (ref 3.81–5.12)
SODIUM SERPL-SCNC: 141 MMOL/L (ref 135–147)
TRIGL SERPL-MCNC: 114 MG/DL
TSH SERPL DL<=0.05 MIU/L-ACNC: 3.15 UIU/ML (ref 0.45–4.5)
WBC # BLD AUTO: 7.42 THOUSAND/UL (ref 4.31–10.16)

## 2023-09-01 PROCEDURE — 84443 ASSAY THYROID STIM HORMONE: CPT

## 2023-09-01 PROCEDURE — 80053 COMPREHEN METABOLIC PANEL: CPT

## 2023-09-01 PROCEDURE — 80061 LIPID PANEL: CPT

## 2023-09-01 PROCEDURE — 83690 ASSAY OF LIPASE: CPT

## 2023-09-01 PROCEDURE — 36415 COLL VENOUS BLD VENIPUNCTURE: CPT

## 2023-09-01 PROCEDURE — 85027 COMPLETE CBC AUTOMATED: CPT

## 2023-09-20 DIAGNOSIS — Z78.0 POSTMENOPAUSAL: ICD-10-CM

## 2023-10-04 DIAGNOSIS — E03.9 HYPOTHYROIDISM, UNSPECIFIED TYPE: ICD-10-CM

## 2023-10-04 DIAGNOSIS — E78.5 HYPERLIPIDEMIA, UNSPECIFIED HYPERLIPIDEMIA TYPE: ICD-10-CM

## 2023-10-04 RX ORDER — ROSUVASTATIN CALCIUM 5 MG/1
TABLET, COATED ORAL
Qty: 90 TABLET | Refills: 1 | Status: SHIPPED | OUTPATIENT
Start: 2023-10-04

## 2023-10-04 RX ORDER — LEVOTHYROXINE SODIUM 0.05 MG/1
TABLET ORAL
Qty: 90 TABLET | Refills: 1 | Status: SHIPPED | OUTPATIENT
Start: 2023-10-04

## 2023-10-24 ENCOUNTER — OFFICE VISIT (OUTPATIENT)
Age: 73
End: 2023-10-24
Payer: COMMERCIAL

## 2023-10-24 VITALS — BODY MASS INDEX: 24.56 KG/M2 | TEMPERATURE: 97.9 F | WEIGHT: 152.8 LBS | HEIGHT: 66 IN

## 2023-10-24 DIAGNOSIS — L81.4 LENTIGO: Primary | ICD-10-CM

## 2023-10-24 DIAGNOSIS — D22.5 MULTIPLE BENIGN MELANOCYTIC NEVI OF UPPER EXTREMITY, LOWER EXTREMITY, AND TRUNK: ICD-10-CM

## 2023-10-24 DIAGNOSIS — D22.70 MULTIPLE BENIGN MELANOCYTIC NEVI OF UPPER EXTREMITY, LOWER EXTREMITY, AND TRUNK: ICD-10-CM

## 2023-10-24 DIAGNOSIS — D22.60 MULTIPLE BENIGN MELANOCYTIC NEVI OF UPPER EXTREMITY, LOWER EXTREMITY, AND TRUNK: ICD-10-CM

## 2023-10-24 DIAGNOSIS — D18.01 CHERRY ANGIOMA: ICD-10-CM

## 2023-10-24 PROCEDURE — 99213 OFFICE O/P EST LOW 20 MIN: CPT | Performed by: DERMATOLOGY

## 2023-10-24 RX ORDER — ATOVAQUONE AND PROGUANIL HYDROCHLORIDE 250; 100 MG/1; MG/1
TABLET, FILM COATED ORAL
COMMUNITY
Start: 2023-09-05

## 2023-10-24 NOTE — PATIENT INSTRUCTIONS
MELANOCYTIC NEVI ("Moles")      Assessment and Plan:  Based on a thorough discussion of this condition and the management approach to it (including a comprehensive discussion of the known risks, side effects and potential benefits of treatment), the patient (family) agrees to implement the following specific plan:  When outside we recommend using a wide brim hat, sunglasses, long sleeve and pants, sunscreen with SPF 41+ with reapplication every 2 hours, or SPF specific clothing   Benign, reassured  Annual to bi-annual skin check     Melanocytic Nevi  Melanocytic nevi ("moles") are tan or brown, raised or flat areas of the skin which have an increased number of melanocytes. Melanocytes are the cells in our body which make pigment and account for skin color. Some moles are present at birth (I.e., "congenital nevi"), while others come up later in life (i.e., "acquired nevi"). The sun can stimulate the body to make more moles. Sunburns are not the only thing that triggers more moles. Chronic sun exposure can do it too. Clinically distinguishing a healthy mole from melanoma may be difficult, even for experienced dermatologists. The "ABCDE's" of moles have been suggested as a means of helping to alert a person to a suspicious mole and the possible increased risk of melanoma. The suggestions for raising alert are as follows:    Asymmetry: Healthy moles tend to be symmetric, while melanomas are often asymmetric. Asymmetry means if you draw a line through the mole, the two halves do not match in color, size, shape, or surface texture. Asymmetry can be a result of rapid enlargement of a mole, the development of a raised area on a previously flat lesion, scaling, ulceration, bleeding or scabbing within the mole. Any mole that starts to demonstrate "asymmetry" should be examined promptly by a board certified dermatologist.     Border: Healthy moles tend to have discrete, even borders.   The border of a melanoma often blends into the normal skin and does not sharply delineate the mole from normal skin. Any mole that starts to demonstrate "uneven borders" should be examined promptly by a board certified dermatologist.     Color: Healthy moles tend to be one color throughout. Melanomas tend to be made up of different colors ranging from dark black, blue, white, or red. Any mole that demonstrates a color change should be examined promptly by a board certified dermatologist.     Diameter: Healthy moles tend to be smaller than 0.6 cm in size; an exception are "congenital nevi" that can be larger. Melanomas tend to grow and can often be greater than 0.6 cm (1/4 of an inch, or the size of a pencil eraser). This is only a guideline, and many normal moles may be larger than 0.6 cm without being unhealthy. Any mole that starts to change in size (small to bigger or bigger to smaller) should be examined promptly by a board certified dermatologist.     Evolving: Healthy moles tend to "stay the same."  Melanomas may often show signs of change or evolution such as a change in size, shape, color, or elevation. Any mole that starts to itch, bleed, crust, burn, hurt, or ulcerate or demonstrate a change or evolution should be examined promptly by a board certified dermatologist.        Deryl Jeans and Plan:  Based on a thorough discussion of this condition and the management approach to it (including a comprehensive discussion of the known risks, side effects and potential benefits of treatment), the patient (family) agrees to implement the following specific plan:  When outside we recommend using a wide brim hat, sunglasses, long sleeve and pants, sunscreen with SPF 27+ with reapplication every 2 hours, or SPF specific clothing       What is a lentigo? A lentigo is a pigmented flat or slightly raised lesion with a clearly defined edge. Unlike an ephelis (freckle), it does not fade in the winter months.  There are several kinds of lentigo. The name lentigo originally referred to its appearance resembling a small lentil. The plural of lentigo is lentigines, although “lentigos” is also in common use. Who gets lentigines? Lentigines can affect males and females of all ages and races. Solar lentigines are especially prevalent in fair skinned adults. Lentigines associated with syndromes are present at birth or arise during childhood. What causes lentigines? Common forms of lentigo are due to exposure to ultraviolet radiation:  Sun damage including sunburn   Indoor tanning   Phototherapy, especially photochemotherapy (PUVA)    Ionizing radiation, eg radiation therapy, can also cause lentigines. Several familial syndromes associated with widespread lentigines originate from mutations in Ike-MAP kinase, mTOR signaling and PTEN pathways. What is the treatment for lentigines? Most lentigines are left alone. Attempts to lighten them may not be successful. The following approaches are used:  SPF 50+ broad-spectrum sunscreen   Hydroquinone bleaching cream   Alpha hydroxy acids   Vitamin C   Retinoids   Azelaic acid   Chemical peels  Individual lesions can be permanently removed using:  Cryotherapy   Intense pulsed light   Pigment lasers    How can lentigines be prevented? Lentigines associated with exposure ultraviolet radiation can be prevented by very careful sun protection. Clothing is more successful at preventing new lentigines than are sunscreens. What is the outlook for lentigines? Lentigines usually persist. They may increase in number with age and sun exposure. Some in sun-protected sites may fade and disappear.     RICKS ANGIOMAS      Assessment and Plan:  Based on a thorough discussion of this condition and the management approach to it (including a comprehensive discussion of the known risks, side effects and potential benefits of treatment), the patient (family) agrees to implement the following specific plan:  Monitor for changes  Benign, reassured      Assessment and Plan:    Cherry angioma, also known as [employer] spots, are benign vascular skin lesions. A "cherry angioma" is a firm red, blue or purple papule, 0.1-1 cm in diameter. When thrombosed, they can appear black in colour until evaluated with a dermatoscope when the red or purple colour is more easily seen. Cherry angioma may develop on any part of the body but most often appear on the scalp, face, lips and trunk. An angioma is due to proliferating endothelial cells; these are the cells that line the inside of a blood vessel. Angiomas can arise in early life or later in life; the most common type of angioma is a cherry angioma. Cherry angiomas are very common in males and females of any age or race. They are more noticeable in white skin than in skin of colour. They markedly increase in number from about the age of 36. There may be a family history of similar lesions. Eruptive cherry angiomas have been rarely reported to be associated with internal malignancy. The cause of angiomas is unknown. Genetic analysis of cherry angiomas has shown that they frequently carry specific somatic missense mutations in the GNAQ and GNA11 (Q209H) genes, which are involved in other vascular and melanocytic proliferations. SEBORRHEIC KERATOSIS; NON-INFLAMED    Assessment and Plan:  Based on a thorough discussion of this condition and the management approach to it (including a comprehensive discussion of the known risks, side effects and potential benefits of treatment), the patient (family) agrees to implement the following specific plan:  Monitor for changes  Benign, reassured      Seborrheic Keratosis  A seborrheic keratosis is a harmless warty spot that appears during adult life as a common sign of skin aging. Seborrheic keratoses can arise on any area of skin, covered or uncovered, with the usual exception of the palms and soles.  They do not arise from mucous membranes. Seborrheic keratoses can have highly variable appearance. Seborrheic keratoses are extremely common. It has been estimated that over 90% of adults over the age of 61 years have one or more of them. They occur in males and females of all races, typically beginning to erupt in the 35s or 45s. They are uncommon under the age of 21 years. The precise cause of seborrhoeic keratoses is not known. Seborrhoeic keratoses are considered degenerative in nature. As time goes by, seborrheic keratoses tend to become more numerous. Some people inherit a tendency to develop a very large number of them; some people may have hundreds of them. There is no easy way to remove multiple lesions on a single occasion. Unless a specific lesion is "inflamed" and is causing pain or stinging/burning or is bleeding, most insurance companies do not authorize treatment.

## 2023-10-24 NOTE — PROGRESS NOTES
[facility] Note     Patient Name: Sussy House  Encounter Date: 10/24/23     Have you been cared for by a [facility] Dermatologist in the last 3 years and, if so, which description applies to you? Yes. I have been here within the last 3 years, and my medical history has NOT changed since that time. I am FEMALE/of child-bearing potential.    REVIEW OF SYSTEMS:  Have you recently had or currently have any of the following? No changes in my recent health. PAST MEDICAL HISTORY:  Have you personally ever had or currently have any of the following? If "YES," then please provide more detail. No changes in my medical history. HISTORY OF IMMUNOSUPPRESSION: Do you have a history of any of the following:  Systemic Immunosuppression such as Diabetes, Biologic or Immunotherapy, Chemotherapy, Organ Transplantation, Bone Marrow Transplantation? {YES/NO W/DESCRIPTION:75012}     Answering "YES" requires the addition of the dotphrase "IMMUNOSUPPRESSED" as the first diagnosis of the patient's visit. FAMILY HISTORY:  Any "first degree relatives" (parent, brother, sister, or child) with the following? No changes in my family's known health. PATIENT EXPERIENCE:    Do you want the Dermatologist to perform a COMPLETE skin exam today including a clinical examination under the "bra and underwear" areas? Yes  If necessary, do we have your permission to call and leave a detailed message on your Preferred Phone number that includes your specific medical information?   Yes      No Known Allergies   Current Outpatient Medications:     aspirin 81 mg chewable tablet, Chew daily, Disp: , Rfl:     atovaquone-proguanil (MALARONE) 250-100 mg, PLEASE SEE ATTACHED FOR DETAILED DIRECTIONS, Disp: , Rfl:     calcium carbonate 1250 MG capsule, Take 1,250 mg by mouth 2 (two) times a day with meals, Disp: , Rfl:     estradiol (VAGIFEM, YUVAFEM) 10 MCG TABS vaginal tablet, Insert 1 tablet into the vagina, Disp: , Rfl: levothyroxine 50 mcg tablet, TAKE 1 TABLET DAILY IN THE EARLY MORNING, Disp: 90 tablet, Rfl: 1    Multiple Vitamin (MULTIVITAMIN ADULT PO), Take 1 capsule by mouth, Disp: , Rfl:     omega-3-acid ethyl esters (LOVAZA) 1 g capsule, Take 2 g by mouth, Disp: , Rfl:     rosuvastatin (CRESTOR) 5 mg tablet, TAKE 1 TABLET DAILY, Disp: 90 tablet, Rfl: 1    zolpidem (AMBIEN) 10 mg tablet, Take 1 tablet (10 mg total) by mouth daily at bedtime as needed for sleep, Disp: 10 tablet, Rfl: 0    Acetaminophen (TYLENOL 8 HOUR PO), Tylenol, Disp: , Rfl:     Diclofenac Sodium (Voltaren) 1 %, as directed Externally, Disp: , Rfl:     Hepatitis A-Hep B Recomb Vac (TWINRIX IM), , Disp: , Rfl:     measles-mumps-rubella (M-M-R II), , Disp: , Rfl:     Meningococcal B Vac, Recomb, (TRUMENBA IM), , Disp: , Rfl:     metoprolol succinate (TOPROL-XL) 25 mg 24 hr tablet, Take 25 mg by mouth daily, Disp: , Rfl:     TriVisc injection, INJECT 1 SYRINGE WEEKLY INTO THE KNEE (Patient not taking: Reported on 8/28/2023), Disp: , Rfl:     valACYclovir (VALTREX) 500 mg tablet, TAKE AS NEEDED AS DIRECTED, Disp: 30 tablet, Rfl: 3          Whom besides the patient is providing clinical information about today's encounter?    NO ADDITIONAL HISTORIAN (patient alone provided history)    Physical Exam and Assessment/Plan by Diagnosis:

## 2023-10-24 NOTE — PROGRESS NOTES
[facility] Note     Patient Name: Lyndon Bruce  Encounter Date: 10/24/23     Have you been cared for by a [facility] D[facility]atologist in the last 3 years and, if so, which description applies to you? NO. I am considered a "new" patient and must complete all patient intake questions. I am FEMALE/of child-bearing potential.    REVIEW OF SYSTEMS:  Have you recently had or currently have any of the following? Recent fever or chills? No  Any non-healing wound? No  Are you pregnant or planning to become pregnant? No  Are you currently or planning to be nursing or breast feeding? No   PAST MEDICAL HISTORY:  Have you personally ever had or currently have any of the following? If "YES," then please provide more detail. Skin cancer (such as Melanoma, Basal Cell Carcinoma, Squamous Cell Carcinoma? No  Tuberculosis, HIV/AIDS, Hepatitis B or C: No  Radiation Treatment No   HISTORY OF IMMUNOSUPPRESSION:   Do you have a history of any of the following:  Systemic Immunosuppression such as Diabetes, Biologic or Immunotherapy, Chemotherapy, Organ Transplantation, Bone Marrow Transplantation? No    Answering "YES" requires the addition of the dotphrase "IMMUNOSUPPRESSED" as the first diagnosis of the patient's visit. FAMILY HISTORY:  Any "first degree relatives" (parent, brother, sister, or child) with the following? Skin Cancer, Pancreatic or Other Cancer? YES, Mother BCC. sister breast cancer   PATIENT EXPERIENCE:    Do you want the Dermatologist to perform a COMPLETE skin exam today including a clinical examination under the "bra and underwear" areas? Yes  If necessary, do we have your permission to call and leave a detailed message on your Preferred Phone number that includes your specific medical information?   Yes      No Known Allergies   Current Outpatient Medications:   •  Acetaminophen (TYLENOL 8 HOUR PO), Tylenol, Disp: , Rfl:   •  aspirin 81 mg chewable tablet, Chew daily, Disp: , Rfl:   • calcium carbonate 1250 MG capsule, Take 1,250 mg by mouth 2 (two) times a day with meals, Disp: , Rfl:   •  Diclofenac Sodium (Voltaren) 1 %, as directed Externally, Disp: , Rfl:   •  estradiol (VAGIFEM, YUVAFEM) 10 MCG TABS vaginal tablet, Insert 1 tablet into the vagina, Disp: , Rfl:   •  levothyroxine 50 mcg tablet, TAKE 1 TABLET DAILY IN THE EARLY MORNING, Disp: 90 tablet, Rfl: 1  •  omega-3-acid ethyl esters (LOVAZA) 1 g capsule, Take 2 g by mouth, Disp: , Rfl:   •  rosuvastatin (CRESTOR) 5 mg tablet, TAKE 1 TABLET DAILY, Disp: 90 tablet, Rfl: 1  •  atovaquone-proguanil (MALARONE) 250-100 mg, PLEASE SEE ATTACHED FOR DETAILED DIRECTIONS, Disp: , Rfl:   •  Hepatitis A-Hep B Recomb Vac (TWINRIX IM), , Disp: , Rfl:   •  measles-mumps-rubella (M-M-R II), , Disp: , Rfl:   •  Meningococcal B Vac, Recomb, (TRUMENBA IM), , Disp: , Rfl:   •  metoprolol succinate (TOPROL-XL) 25 mg 24 hr tablet, Take 25 mg by mouth daily, Disp: , Rfl:   •  Multiple Vitamin (MULTIVITAMIN ADULT PO), Take 1 capsule by mouth (Patient not taking: Reported on 10/24/2023), Disp: , Rfl:   •  TriVisc injection, INJECT 1 SYRINGE WEEKLY INTO THE KNEE (Patient not taking: Reported on 8/28/2023), Disp: , Rfl:   •  valACYclovir (VALTREX) 500 mg tablet, TAKE AS NEEDED AS DIRECTED, Disp: 30 tablet, Rfl: 3  •  zolpidem (AMBIEN) 10 mg tablet, Take 1 tablet (10 mg total) by mouth daily at bedtime as needed for sleep, Disp: 10 tablet, Rfl: 0          Whom besides the patient is providing clinical information about today's encounter?    NO ADDITIONAL HISTORIAN (patient alone provided history)    Physical Exam and Assessment/Plan by Diagnosis:    MELANOCYTIC NEVI ("Moles")    Physical Exam:  Anatomic Location Affected:   Mostly on sun-exposed areas of the trunk and extremities  Morphological Description:  Scattered, 1-4mm round to ovoid, symmetrical-appearing, even bordered, skin colored to dark brown macules/papules, mostly in sun-exposed areas  Pertinent Positives:  Pertinent Negatives: Additional History of Present Condition:      Assessment and Plan:  Based on a thorough discussion of this condition and the management approach to it (including a comprehensive discussion of the known risks, side effects and potential benefits of treatment), the patient (family) agrees to implement the following specific plan:  When outside we recommend using a wide brim hat, sunglasses, long sleeve and pants, sunscreen with SPF 63+ with reapplication every 2 hours, or SPF specific clothing   Benign, reassured  Annual to bi-annual  skin check     Melanocytic Nevi  Melanocytic nevi ("moles") are tan or brown, raised or flat areas of the skin which have an increased number of melanocytes. Melanocytes are the cells in our body which make pigment and account for skin color. Some moles are present at birth (I.e., "congenital nevi"), while others come up later in life (i.e., "acquired nevi"). The sun can stimulate the body to make more moles. Sunburns are not the only thing that triggers more moles. Chronic sun exposure can do it too. Clinically distinguishing a healthy mole from melanoma may be difficult, even for experienced dermatologists. The "ABCDE's" of moles have been suggested as a means of helping to alert a person to a suspicious mole and the possible increased risk of melanoma. The suggestions for raising alert are as follows:    Asymmetry: Healthy moles tend to be symmetric, while melanomas are often asymmetric. Asymmetry means if you draw a line through the mole, the two halves do not match in color, size, shape, or surface texture. Asymmetry can be a result of rapid enlargement of a mole, the development of a raised area on a previously flat lesion, scaling, ulceration, bleeding or scabbing within the mole.   Any mole that starts to demonstrate "asymmetry" should be examined promptly by a board certified dermatologist.     Border: Healthy moles tend to have discrete, even borders. The border of a melanoma often blends into the normal skin and does not sharply delineate the mole from normal skin. Any mole that starts to demonstrate "uneven borders" should be examined promptly by a board certified dermatologist.     Color: Healthy moles tend to be one color throughout. Melanomas tend to be made up of different colors ranging from dark black, blue, white, or red. Any mole that demonstrates a color change should be examined promptly by a board certified dermatologist.     Diameter: Healthy moles tend to be smaller than 0.6 cm in size; an exception are "congenital nevi" that can be larger. Melanomas tend to grow and can often be greater than 0.6 cm (1/4 of an inch, or the size of a pencil eraser). This is only a guideline, and many normal moles may be larger than 0.6 cm without being unhealthy. Any mole that starts to change in size (small to bigger or bigger to smaller) should be examined promptly by a board certified dermatologist.     Evolving: Healthy moles tend to "stay the same."  Melanomas may often show signs of change or evolution such as a change in size, shape, color, or elevation. Any mole that starts to itch, bleed, crust, burn, hurt, or ulcerate or demonstrate a change or evolution should be examined promptly by a board certified dermatologist.        LENTIGO    Physical Exam:  Anatomic Location Affected:  trunk, arms  Morphological Description:  Light brown macules  Pertinent Positives:  Pertinent Negatives:     Additional History of Present Condition:      Assessment and Plan:  Based on a thorough discussion of this condition and the management approach to it (including a comprehensive discussion of the known risks, side effects and potential benefits of treatment), the patient (family) agrees to implement the following specific plan:  When outside we recommend using a wide brim hat, sunglasses, long sleeve and pants, sunscreen with SPF 30+ with reapplication every 2 hours, or SPF specific clothing       What is a lentigo? A lentigo is a pigmented flat or slightly raised lesion with a clearly defined edge. Unlike an ephelis (freckle), it does not fade in the winter months. There are several kinds of lentigo. The name lentigo originally referred to its appearance resembling a small lentil. The plural of lentigo is lentigines, although “lentigos” is also in common use. Who gets lentigines? Lentigines can affect males and females of all ages and races. Solar lentigines are especially prevalent in fair skinned adults. Lentigines associated with syndromes are present at birth or arise during childhood. What causes lentigines? Common forms of lentigo are due to exposure to ultraviolet radiation:  Sun damage including sunburn   Indoor tanning   Phototherapy, especially photochemotherapy (PUVA)    Ionizing radiation, eg radiation therapy, can also cause lentigines. Several familial syndromes associated with widespread lentigines originate from mutations in Ike-MAP kinase, mTOR signaling and PTEN pathways. What is the treatment for lentigines? Most lentigines are left alone. Attempts to lighten them may not be successful. The following approaches are used:  SPF 50+ broad-spectrum sunscreen   Hydroquinone bleaching cream   Alpha hydroxy acids   Vitamin C   Retinoids   Azelaic acid   Chemical peels  Individual lesions can be permanently removed using:  Cryotherapy   Intense pulsed light   Pigment lasers    How can lentigines be prevented? Lentigines associated with exposure ultraviolet radiation can be prevented by very careful sun protection. Clothing is more successful at preventing new lentigines than are sunscreens. What is the outlook for lentigines? Lentigines usually persist. They may increase in number with age and sun exposure. Some in sun-protected sites may fade and disappear.     RICKS ANGIOMAS    Physical Exam:  Anatomic Location Affected:  trunk  Morphological Description:  Scattered cherry red, 1-4 mm papules. Pertinent Positives:  Pertinent Negatives: Additional History of Present Condition:      Assessment and Plan:  Based on a thorough discussion of this condition and the management approach to it (including a comprehensive discussion of the known risks, side effects and potential benefits of treatment), the patient (family) agrees to implement the following specific plan:  Monitor for changes  Benign, reassured      Assessment and Plan:    Cherry angioma, also known as [place] Draft[place] spots, are benign vascular skin lesions. A "cherry angioma" is a firm red, blue or purple papule, 0.1-1 cm in diameter. When thrombosed, they can appear black in colour until evaluated with a dermatoscope when the red or purple colour is more easily seen. Cherry angioma may develop on any part of the body but most often appear on the scalp, face, lips and trunk. An angioma is due to proliferating endothelial cells; these are the cells that line the inside of a blood vessel. Angiomas can arise in early life or later in life; the most common type of angioma is a cherry angioma. Cherry angiomas are very common in males and females of any age or race. They are more noticeable in white skin than in skin of colour. They markedly increase in number from about the age of 36. There may be a family history of similar lesions. Eruptive cherry angiomas have been rarely reported to be associated with internal malignancy. The cause of angiomas is unknown. Genetic analysis of cherry angiomas has shown that they frequently carry specific somatic missense mutations in the GNAQ and GNA11 (Q209H) genes, which are involved in other vascular and melanocytic proliferations.       SEBORRHEIC KERATOSIS; NON-INFLAMED    Physical Exam:  Anatomic Location Affected:  trunk  Morphological Description:  Flat and raised, waxy, smooth to warty textured, yellow to brownish-grey to dark brown to blackish, discrete, "stuck-on" appearing papules. Pertinent Positives:  Pertinent Negatives: Additional History of Present Condition:      Assessment and Plan:  Based on a thorough discussion of this condition and the management approach to it (including a comprehensive discussion of the known risks, side effects and potential benefits of treatment), the patient (family) agrees to implement the following specific plan:  Monitor for changes  Benign, reassured      Seborrheic Keratosis  A seborrheic keratosis is a harmless warty spot that appears during adult life as a common sign of skin aging. Seborrheic keratoses can arise on any area of skin, covered or uncovered, with the usual exception of the palms and soles. They do not arise from mucous membranes. Seborrheic keratoses can have highly variable appearance. Seborrheic keratoses are extremely common. It has been estimated that over 90% of adults over the age of 61 years have one or more of them. They occur in males and females of all races, typically beginning to erupt in the 35s or 45s. They are uncommon under the age of 21 years. The precise cause of seborrhoeic keratoses is not known. Seborrhoeic keratoses are considered degenerative in nature. As time goes by, seborrheic keratoses tend to become more numerous. Some people inherit a tendency to develop a very large number of them; some people may have hundreds of them. There is no easy way to remove multiple lesions on a single occasion. Unless a specific lesion is "inflamed" and is causing pain or stinging/burning or is bleeding, most insurance companies do not authorize treatment.     Scribe Attestation    I,:  Elise Brito am acting as a scribe while in the presence of the attending physician.:       I,:  Ciera Juarez MD personally performed the services described in this documentation    as scribed in my presence.:

## 2023-10-27 PROBLEM — Z13.0 SCREENING FOR DEFICIENCY ANEMIA: Status: RESOLVED | Noted: 2020-09-07 | Resolved: 2023-10-27

## 2023-12-28 ENCOUNTER — OFFICE VISIT (OUTPATIENT)
Dept: OBGYN CLINIC | Facility: CLINIC | Age: 73
End: 2023-12-28
Payer: COMMERCIAL

## 2023-12-28 VITALS — WEIGHT: 155 LBS | BODY MASS INDEX: 25.02 KG/M2 | SYSTOLIC BLOOD PRESSURE: 142 MMHG | DIASTOLIC BLOOD PRESSURE: 88 MMHG

## 2023-12-28 DIAGNOSIS — R92.30 DENSE BREAST: ICD-10-CM

## 2023-12-28 DIAGNOSIS — Z12.31 ENCOUNTER FOR SCREENING MAMMOGRAM FOR MALIGNANT NEOPLASM OF BREAST: ICD-10-CM

## 2023-12-28 DIAGNOSIS — Z78.0 POSTMENOPAUSE: ICD-10-CM

## 2023-12-28 DIAGNOSIS — Z01.419 ENCOUNTER FOR GYNECOLOGICAL EXAMINATION WITHOUT ABNORMAL FINDING: Primary | ICD-10-CM

## 2023-12-28 DIAGNOSIS — R92.2 DENSE BREAST: ICD-10-CM

## 2023-12-28 PROCEDURE — G0101 CA SCREEN;PELVIC/BREAST EXAM: HCPCS | Performed by: OBSTETRICS & GYNECOLOGY

## 2023-12-28 NOTE — PROGRESS NOTES
Subjective      Suzanne Coelho is a 73 y.o.  female who presents for new patient annual well woman exam, patient has stepchildren who visited for the holidays. Patient reports no bleeding, spotting, or discharge.  Patient has history of hysterectomy done in her late 30s in  for painful periods and was discovered to have endometriosis, all organs were removed FRANK/BSO.  Patient was on HRT for several years and then weaned herself off around .  Patient reports No hot flashes/night sweats, not sexually active  has ED, Yes  vaginal dryness, sleeping well.  Patient reports history of normal Pap smears, menarche at age 14, no significant problems, sister was diagnosed with breast cancer at age 83 patient continues with screening.  Patient reports last colonoscopy around age 67 good for 10 years.      Current contraception: status post hysterectomy  History of abnormal Pap smear: no  Family history of uterine or ovarian cancer: no  Regular self breast exam: yes  History of abnormal mammogram: no  Family history of breast cancer: yes -sister age 83    Menstrual History:  OB History          0    Para   0    Term   0       0    AB   0    Living   0         SAB   0    IAB   0    Ectopic   0    Multiple   0    Live Births   0                Menarche age: 14  No LMP recorded. Patient has had a hysterectomy.       Past Medical History:   Diagnosis Date    Endometriosis     last assessed 2017    Hypothyroidism     MVP (mitral valve prolapse)      Past Surgical History:   Procedure Laterality Date    BREAST BIOPSY      Calcifications( negative)    HYSTERECTOMY  1993     OB History          0    Para   0    Term   0       0    AB   0    Living   0         SAB   0    IAB   0    Ectopic   0    Multiple   0    Live Births   0               Current Outpatient Medications   Medication Instructions    Acetaminophen (TYLENOL 8 HOUR PO) Tylenol    aspirin 81 mg chewable tablet  Oral, Daily    calcium carbonate 1,250 mg, Oral, 2 times daily with meals    Diclofenac Sodium (Voltaren) 1 % as directed Externally    estradiol (VAGIFEM, YUVAFEM) 10 MCG TABS vaginal tablet 1 tablet, Vaginal    Hepatitis A-Hep B Recomb Vac (TWINRIX IM) No dose, route, or frequency recorded.    levothyroxine 50 mcg tablet TAKE 1 TABLET DAILY IN THE EARLY MORNING    measles-mumps-rubella (M-M-R II) No dose, route, or frequency recorded.    Meningococcal B Vac, Recomb, (TRUMENBA IM) No dose, route, or frequency recorded.    metoprolol succinate (TOPROL-XL) 25 mg, Oral, Daily    Multiple Vitamin (MULTIVITAMIN ADULT PO) 1 capsule    omega-3-acid ethyl esters (LOVAZA) 2 g, Oral    rosuvastatin (CRESTOR) 5 mg tablet TAKE 1 TABLET DAILY    TriVisc injection INJECT 1 SYRINGE WEEKLY INTO THE KNEE    valACYclovir (VALTREX) 500 mg tablet TAKE AS NEEDED AS DIRECTED   No Known Allergies  Social History     Tobacco Use    Smoking status: Former     Current packs/day: 0.25     Average packs/day: 0.3 packs/day for 10.0 years (2.5 ttl pk-yrs)     Types: Cigarettes    Smokeless tobacco: Never   Vaping Use    Vaping status: Never Used   Substance Use Topics    Alcohol use: Yes     Alcohol/week: 3.0 standard drinks of alcohol     Types: 3 Glasses of wine per week     Comment: 4x weekly glass of wine    Drug use: No       Review of Systems  Review of Systems   Constitutional:  Negative for fatigue, fever and unexpected weight change.   HENT:  Negative for dental problem, mouth sores, nosebleeds, rhinorrhea, sinus pressure, sinus pain and sore throat.    Eyes:  Negative for pain, discharge and visual disturbance.   Respiratory:  Negative for cough, chest tightness, shortness of breath and wheezing.    Cardiovascular:  Negative for chest pain, palpitations and leg swelling.   Gastrointestinal:  Negative for blood in stool, constipation, diarrhea, nausea and vomiting.   Endocrine: Negative for polydipsia.   Genitourinary:  Negative for  difficulty urinating, dyspareunia, dysuria, menstrual problem, pelvic pain, urgency, vaginal discharge and vaginal pain.   Musculoskeletal:  Positive for back pain. Negative for arthralgias and joint swelling.   Allergic/Immunologic: Negative for environmental allergies.   Neurological:  Negative for seizures, light-headedness and headaches.   Hematological:  Does not bruise/bleed easily.   Psychiatric/Behavioral:  The patient is not nervous/anxious.       Objective   Vitals:    23 1048 23 1122   BP: 140/100 142/88   Weight: 70.3 kg (155 lb)      General:   alert and oriented, in no acute distress   Heart: regular rate and rhythm, S1, S2 normal, no murmur, click, rub or gallop   Lungs: clear to auscultation bilaterally   Abdomen: soft, non-tender, without masses or organomegaly, very well-healed Pfannenstiel incision   Vulva: normal   Vagina: normal mucosa, atrophic   Cervix: surgically absent   Uterus: surgically absent   Adnexa: normal adnexa and no mass, fullness, tenderness   Breast inspection negative, no nipple discharge or bleeding, no masses or nodularity palpable  Rectal negative, stool guaiac negative  Thyroid normal no masses or nodules     Assessment   73-year-old  with history of hysterectomy for painful periods and diagnosis of endometriosis at that time.  History of normal Pap smears Pap not indicated today mammogram and ABUS done 2023, last DEXA scan 2023 some osteopenia     Plan   Given slip for mammogram and ABUS for next  return in 2 years or sooner as needed

## 2024-03-27 DIAGNOSIS — E03.9 HYPOTHYROIDISM, UNSPECIFIED TYPE: ICD-10-CM

## 2024-03-27 DIAGNOSIS — E78.5 HYPERLIPIDEMIA, UNSPECIFIED HYPERLIPIDEMIA TYPE: ICD-10-CM

## 2024-03-27 RX ORDER — ROSUVASTATIN CALCIUM 5 MG/1
TABLET, COATED ORAL
Qty: 90 TABLET | Refills: 0 | Status: SHIPPED | OUTPATIENT
Start: 2024-03-27

## 2024-03-27 RX ORDER — LEVOTHYROXINE SODIUM 0.05 MG/1
TABLET ORAL
Qty: 90 TABLET | Refills: 0 | Status: SHIPPED | OUTPATIENT
Start: 2024-03-27

## 2024-05-31 ENCOUNTER — APPOINTMENT (OUTPATIENT)
Dept: LAB | Facility: CLINIC | Age: 74
End: 2024-05-31

## 2024-05-31 DIAGNOSIS — Z00.6 ENCOUNTER FOR EXAMINATION FOR NORMAL COMPARISON OR CONTROL IN CLINICAL RESEARCH PROGRAM: ICD-10-CM

## 2024-05-31 PROCEDURE — 36415 COLL VENOUS BLD VENIPUNCTURE: CPT

## 2024-06-16 LAB
APOB+LDLR+PCSK9 GENE MUT ANL BLD/T: NOT DETECTED
BRCA1+BRCA2 DEL+DUP + FULL MUT ANL BLD/T: NOT DETECTED
MLH1+MSH2+MSH6+PMS2 GN DEL+DUP+FUL M: NOT DETECTED

## 2024-06-17 ENCOUNTER — VBI (OUTPATIENT)
Dept: ADMINISTRATIVE | Facility: OTHER | Age: 74
End: 2024-06-17

## 2024-06-18 NOTE — TELEPHONE ENCOUNTER
Upon review of the In Basket request we were able to locate, review, and update the patient chart as requested for Mammogram. x2    Any additional questions or concerns should be emailed to the Practice Liaisons via the appropriate education email address, please do not reply via In Basket.    Thank you  Xiomy Truong   [facility]

## 2024-06-25 DIAGNOSIS — E03.9 HYPOTHYROIDISM, UNSPECIFIED TYPE: ICD-10-CM

## 2024-06-25 DIAGNOSIS — E78.5 HYPERLIPIDEMIA, UNSPECIFIED HYPERLIPIDEMIA TYPE: ICD-10-CM

## 2024-06-25 RX ORDER — ROSUVASTATIN CALCIUM 5 MG/1
TABLET, COATED ORAL
Qty: 90 TABLET | Refills: 1 | Status: SHIPPED | OUTPATIENT
Start: 2024-06-25

## 2024-06-25 RX ORDER — LEVOTHYROXINE SODIUM 0.05 MG/1
TABLET ORAL
Qty: 90 TABLET | Refills: 1 | Status: SHIPPED | OUTPATIENT
Start: 2024-06-25

## 2024-08-26 ENCOUNTER — RA CDI HCC (OUTPATIENT)
Dept: OTHER | Facility: HOSPITAL | Age: 74
End: 2024-08-26

## 2024-09-04 ENCOUNTER — OFFICE VISIT (OUTPATIENT)
Dept: FAMILY MEDICINE CLINIC | Facility: CLINIC | Age: 74
End: 2024-09-04
Payer: COMMERCIAL

## 2024-09-04 VITALS
RESPIRATION RATE: 12 BRPM | WEIGHT: 151 LBS | TEMPERATURE: 98.7 F | OXYGEN SATURATION: 98 % | SYSTOLIC BLOOD PRESSURE: 124 MMHG | DIASTOLIC BLOOD PRESSURE: 70 MMHG | HEART RATE: 78 BPM | BODY MASS INDEX: 24.27 KG/M2 | HEIGHT: 66 IN

## 2024-09-04 DIAGNOSIS — E78.5 HYPERLIPIDEMIA, UNSPECIFIED HYPERLIPIDEMIA TYPE: ICD-10-CM

## 2024-09-04 DIAGNOSIS — Z13.0 SCREENING FOR DEFICIENCY ANEMIA: ICD-10-CM

## 2024-09-04 DIAGNOSIS — I48.0 PAF (PAROXYSMAL ATRIAL FIBRILLATION) (HCC): ICD-10-CM

## 2024-09-04 DIAGNOSIS — E03.9 HYPOTHYROIDISM, UNSPECIFIED TYPE: ICD-10-CM

## 2024-09-04 DIAGNOSIS — Z00.00 MEDICARE ANNUAL WELLNESS VISIT, SUBSEQUENT: Primary | ICD-10-CM

## 2024-09-04 DIAGNOSIS — K21.00 GASTROESOPHAGEAL REFLUX DISEASE WITH ESOPHAGITIS WITHOUT HEMORRHAGE: ICD-10-CM

## 2024-09-04 PROBLEM — I47.10 SVT (SUPRAVENTRICULAR TACHYCARDIA): Status: ACTIVE | Noted: 2024-09-04

## 2024-09-04 PROCEDURE — G0439 PPPS, SUBSEQ VISIT: HCPCS | Performed by: FAMILY MEDICINE

## 2024-09-04 PROCEDURE — 99214 OFFICE O/P EST MOD 30 MIN: CPT | Performed by: FAMILY MEDICINE

## 2024-09-04 NOTE — PATIENT INSTRUCTIONS
Medicare Preventive Visit Patient Instructions  Thank you for completing your Welcome to Medicare Visit or Medicare Annual Wellness Visit today. Your next wellness visit will be due in one year (9/5/2025).  The screening/preventive services that you may require over the next 5-10 years are detailed below. Some tests may not apply to you based off risk factors and/or age. Screening tests ordered at today's visit but not completed yet may show as past due. Also, please note that scanned in results may not display below.  Preventive Screenings:  Service Recommendations Previous Testing/Comments   Colorectal Cancer Screening  * Colonoscopy    * Fecal Occult Blood Test (FOBT)/Fecal Immunochemical Test (FIT)  * Fecal DNA/Cologuard Test  * Flexible Sigmoidoscopy Age: 45-75 years old   Colonoscopy: every 10 years (may be performed more frequently if at higher risk)  OR  FOBT/FIT: every 1 year  OR  Cologuard: every 3 years  OR  Sigmoidoscopy: every 5 years  Screening may be recommended earlier than age 45 if at higher risk for colorectal cancer. Also, an individualized decision between you and your healthcare provider will decide whether screening between the ages of 76-85 would be appropriate. Colonoscopy: 11/27/2017  FOBT/FIT: Not on file  Cologuard: Not on file  Sigmoidoscopy: Not on file    Screening Current     Breast Cancer Screening Age: 40+ years old  Frequency: every 1-2 years  Not required if history of left and right mastectomy Mammogram: 06/13/2024    Screening Current   Cervical Cancer Screening Between the ages of 21-29, pap smear recommended once every 3 years.   Between the ages of 30-65, can perform pap smear with HPV co-testing every 5 years.   Recommendations may differ for women with a history of total hysterectomy, cervical cancer, or abnormal pap smears in past. Pap Smear: 12/28/2023    Screening Not Indicated   Hepatitis C Screening Once for adults born between 1945 and 1965  More frequently in  patients at high risk for Hepatitis C Hep C Antibody: 07/23/2018    Screening Current   Diabetes Screening 1-2 times per year if you're at risk for diabetes or have pre-diabetes Fasting glucose: 91 mg/dL (9/1/2023)  A1C: No results in last 5 years (No results in last 5 years)      Cholesterol Screening Once every 5 years if you don't have a lipid disorder. May order more often based on risk factors. Lipid panel: 09/01/2023    Screening Not Indicated  History Lipid Disorder     Other Preventive Screenings Covered by Medicare:  Abdominal Aortic Aneurysm (AAA) Screening: covered once if your at risk. You're considered to be at risk if you have a family history of AAA.  Lung Cancer Screening: covers low dose CT scan once per year if you meet all of the following conditions: (1) Age 55-77; (2) No signs or symptoms of lung cancer; (3) Current smoker or have quit smoking within the last 15 years; (4) You have a tobacco smoking history of at least 20 pack years (packs per day multiplied by number of years you smoked); (5) You get a written order from a healthcare provider.  Glaucoma Screening: covered annually if you're considered high risk: (1) You have diabetes OR (2) Family history of glaucoma OR (3)  aged 50 and older OR (4)  American aged 65 and older  Osteoporosis Screening: covered every 2 years if you meet one of the following conditions: (1) You're estrogen deficient and at risk for osteoporosis based off medical history and other findings; (2) Have a vertebral abnormality; (3) On glucocorticoid therapy for more than 3 months; (4) Have primary hyperparathyroidism; (5) On osteoporosis medications and need to assess response to drug therapy.   Last bone density test (DXA Scan): 09/18/2023.  HIV Screening: covered annually if you're between the age of 15-65. Also covered annually if you are younger than 15 and older than 65 with risk factors for HIV infection. For pregnant patients, it is  covered up to 3 times per pregnancy.    Immunizations:  Immunization Recommendations   Influenza Vaccine Annual influenza vaccination during flu season is recommended for all persons aged >= 6 months who do not have contraindications   Pneumococcal Vaccine   * Pneumococcal conjugate vaccine = PCV13 (Prevnar 13), PCV15 (Vaxneuvance), PCV20 (Prevnar 20)  * Pneumococcal polysaccharide vaccine = PPSV23 (Pneumovax) Adults 19-63 yo with certain risk factors or if 65+ yo  If never received any pneumonia vaccine: recommend Prevnar 20 (PCV20)  Give PCV20 if previously received 1 dose of PCV13 or PPSV23   Hepatitis B Vaccine 3 dose series if at intermediate or high risk (ex: diabetes, end stage renal disease, liver disease)   Respiratory syncytial virus (RSV) Vaccine - COVERED BY MEDICARE PART D  * RSVPreF3 (Arexvy) CDC recommends that adults 60 years of age and older may receive a single dose of RSV vaccine using shared clinical decision-making (SCDM)   Tetanus (Td) Vaccine - COST NOT COVERED BY MEDICARE PART B Following completion of primary series, a booster dose should be given every 10 years to maintain immunity against tetanus. Td may also be given as tetanus wound prophylaxis.   Tdap Vaccine - COST NOT COVERED BY MEDICARE PART B Recommended at least once for all adults. For pregnant patients, recommended with each pregnancy.   Shingles Vaccine (Shingrix) - COST NOT COVERED BY MEDICARE PART B  2 shot series recommended in those 19 years and older who have or will have weakened immune systems or those 50 years and older     Health Maintenance Due:      Topic Date Due   • Breast Cancer Screening: Mammogram  06/13/2025   • Colorectal Cancer Screening  11/27/2027   • DXA SCAN  09/18/2028   • Hepatitis C Screening  Completed     Immunizations Due:      Topic Date Due   • IPV Vaccine (2 of 3 - Adult catch-up series) 09/25/2023   • COVID-19 Vaccine (9 - 2023-24 season) 09/01/2024   • Influenza Vaccine (1) 09/01/2024      Advance Directives   What are advance directives?  Advance directives are legal documents that state your wishes and plans for medical care. These plans are made ahead of time in case you lose your ability to make decisions for yourself. Advance directives can apply to any medical decision, such as the treatments you want, and if you want to donate organs.   What are the types of advance directives?  There are many types of advance directives, and each state has rules about how to use them. You may choose a combination of any of the following:  Living will:  This is a written record of the treatment you want. You can also choose which treatments you do not want, which to limit, and which to stop at a certain time. This includes surgery, medicine, IV fluid, and tube feedings.   Durable power of  for healthcare (DPAHC):  This is a written record that states who you want to make healthcare choices for you when you are unable to make them for yourself. This person, called a proxy, is usually a family member or a friend. You may choose more than 1 proxy.  Do not resuscitate (DNR) order:  A DNR order is used in case your heart stops beating or you stop breathing. It is a request not to have certain forms of treatment, such as CPR. A DNR order may be included in other types of advance directives.  Medical directive:  This covers the care that you want if you are in a coma, near death, or unable to make decisions for yourself. You can list the treatments you want for each condition. Treatment may include pain medicine, surgery, blood transfusions, dialysis, IV or tube feedings, and a ventilator (breathing machine).  Values history:  This document has questions about your views, beliefs, and how you feel and think about life. This information can help others choose the care that you would choose.  Why are advance directives important?  An advance directive helps you control your care. Although spoken wishes may  "be used, it is better to have your wishes written down. Spoken wishes can be misunderstood, or not followed. Treatments may be given even if you do not want them. An advance directive may make it easier for your family to make difficult choices about your care.   Fall Prevention    Fall prevention  includes ways to make your home and other areas safer. It also includes ways you can move more carefully to prevent a fall. Health conditions that cause changes in your blood pressure, vision, or muscle strength and coordination may increase your risk for falls. Medicines may also increase your risk for falls if they make you dizzy, weak, or sleepy.   Fall prevention tips:   Stand or sit up slowly.    Use assistive devices as directed.    Wear shoes that fit well and have soles that .    Wear a personal alarm.    Stay active.    Manage your medical conditions.    Home Safety Tips:  Add items to prevent falls in the bathroom.    Keep paths clear.    Install bright lights in your home.    Keep items you use often on shelves within reach.    Paint or place reflective tape on the edges of your stairs.    Alcohol Use and Your Health    Drinking too much can harm your health.  Excessive alcohol use leads to about 88,000 death in the [place] each year, and shortens the life of those who diet by almost 30 years.  Further, excessive drinking cost the economy $249 billion in 2010.  Most excessive drinkers are not alcohol dependent.    Excessive alcohol use has immediate effects that increase the risk of many harmful health conditions.  These are most often the result of binge drinking.  Over time, excessive alcohol use can lead to the development of chronic diseases and other series health problems.    What is considered a \"drink\"?        Excessive alcohol use includes:  Binge Drinking: For women, 4 or more drinks consumed on one occasion. For men, 5 or more drinks consumed on one occasion.  Heavy Drinking: For women, 8 " or more drinks per week. For men, 15 or more drinks per week  Any alcohol used by pregnant women  Any alcohol used by those under the age of 21 years    If you choose to drink, do so in moderation:  Do not drink at all if you are under the age of 21, or if you are or may be pregnant, or have health problems that could be made worse by drinking.  For women, up to 1 drink per day  For men, up to 2 drinks a day    No one should begin drinking or drink more frequently based on potential health benefits    Short-Term Health Risks:  Injuries: motor vehicle crashes, falls, drownings, burns  Violence: homicide, suicide, sexual assault, intimate partner violence  Alcohol poisoning  Reproductive health: risky sexual behaviors, unintended prengnacy, sexually transmitted diseases, miscarriage, stillbirth, fetal alcohol syndrome    Long-Term Health Risks:  Chronic diseases: high blood pressure, heart disease, stroke, liver disease, digestive problems  Cancers: breast, mouth and throat, liver, colon  Learning and memory problems: dementia, poor school performance  Mental health: depression, anxiety, insomnia  Social problems: lost productivity, family problems, unemployment  Alcohol dependence    For support and more information:  Substance Abuse and Mental Health Services Administration  [address]  [place] [address]  Web Address: http://www.samhsa.gov    Alcoholics Anonymous        Web Address: http://www.aa.org    https://www.cdc.gov/alcohol/fact-sheets/alcohol-use.htm     © Copyright [employer] 2018 Information is for End User's use only and may not be sold, redistributed or otherwise used for commercial purposes. All illustrations and images included in CareNotes® are the copyrighted property of A.D.A[employer]M., Inc. or [employer]

## 2024-09-04 NOTE — PROGRESS NOTES
Ambulatory Visit  Name: Suzanne Coelho      : 1950      MRN: [id number]  Encounter Provider: Berkley Damon MD  Encounter Date: 2024   Encounter department: [facility]    Assessment & Plan   1. Medicare annual wellness visit, subsequent  2. Hyperlipidemia, unspecified hyperlipidemia type  Comments:  cont. statin/low chol diet  Orders:  -     Comprehensive metabolic panel; Future  -     Lipase; Future  -     Lipid Panel with Direct LDL reflex; Future  3. Hypothyroidism, unspecified type  Comments:  stable on l-thyroxine  Orders:  -     TSH, 3rd generation; Future  4. Screening for deficiency anemia  -     CBC; Future  5. PAF (paroxysmal atrial fibrillation) (HCC)  Comments:  check labs  schedule cardio [id number] w [facility]-Dr. Nicholas Mccoy  on OAC-Xarelto  6. Gastroesophageal reflux disease with esophagitis without hemorrhage  Comments:  diet controlled  7. BMI 24.0-24.9, adult       Preventive health issues were discussed with patient, and age appropriate screening tests were ordered as noted in patient's After Visit Summary. Personalized health advice and appropriate referrals for health education or preventive services given if needed, as noted in patient's After Visit Summary.    History of Present Illness     HPI   Patient Care Team:  Berkley Damon MD as PCP - General  Gardenia Dyson MD    Follow-up and AWV  Interval hx reviewed  BP in range  Hx palpitations/transient dizziness--watch showed Afib--seen by cardio-[facility]-consult in [facility]--on OAC  Denies CP or increased shortness of breath  Labs due    Review of Systems   Respiratory: Negative.     Cardiovascular:  Positive for palpitations. Negative for chest pain.   Gastrointestinal:         Occ GERD   Endocrine:        Thyroid d/o   Musculoskeletal: Negative.    Neurological: Negative.    Psychiatric/Behavioral:  Positive for sleep disturbance.      Medical History Reviewed by provider this encounter:  Tobacco   Allergies  Meds  Problems  Med Hx  Surg Hx  Fam Hx       Annual Wellness Visit Questionnaire   Suzanne is here for her Subsequent Wellness visit. Last Medicare Wellness visit information reviewed, patient interviewed and updates made to the record today.      Health Risk Assessment:   Patient rates overall health as very good. Patient feels that their physical health rating is slightly worse. Patient is very satisfied with their life. Eyesight was rated as same. Hearing was rated as same. Patient feels that their emotional and mental health rating is same. Patients states they are never, rarely angry. Patient states they are often unusually tired/fatigued. Pain experienced in the last 7 days has been some. Patient's pain rating has been 3/10. Patient states that she has experienced no weight loss or gain in last 6 months.     Depression Screening:   PHQ-2 Score: 0      Fall Risk Screening:   In the past year, patient has experienced: history of falling in past year      Urinary Incontinence Screening:   Patient has not leaked urine accidently in the last six months.     Home Safety:  Patient has trouble with stairs inside or outside of their home. Patient has working smoke alarms and has working carbon monoxide detector. Home safety hazards include: none.     Nutrition:   Current diet is Regular.     Medications:   Patient is currently taking over-the-counter supplements. OTC medications include: see medication list. Patient is able to manage medications.     Activities of Daily Living (ADLs)/Instrumental Activities of Daily Living (IADLs):   Walk and transfer into and out of bed and chair?: Yes  Dress and groom yourself?: Yes    Bathe or shower yourself?: Yes    Feed yourself? Yes  Do your laundry/housekeeping?: Yes  Manage your money, pay your bills and track your expenses?: Yes  Make your own meals?: Yes    Do your own shopping?: Yes    Previous Hospitalizations:   Any hospitalizations or ED visits within  the last 12 months?: No      Advance Care Planning:   Living will: Yes    Durable POA for healthcare: Yes    Advanced directive: Yes    Advanced directive counseling given: Yes    ACP document given: Yes      Cognitive Screening:   Provider or family/friend/caregiver concerned regarding cognition?: No    PREVENTIVE SCREENINGS      Cardiovascular Screening:    General: History Lipid Disorder and Risks and Benefits Discussed    Due for: Lipid Panel      Diabetes Screening:     General: Risks and Benefits Discussed    Due for: Blood Glucose      Colorectal Cancer Screening:     General: Screening Current      Breast Cancer Screening:     General: Screening Current      Cervical Cancer Screening:    General: Screening Not Indicated      Osteoporosis Screening:    General: Screening Current      Abdominal Aortic Aneurysm (AAA) Screening:        General: Screening Not Indicated      Hepatitis C Screening:    General: Screening Current    Screening, Brief Intervention, and Referral to Treatment (SBIRT)    Screening  Typical number of drinks in a day: 1  Typical number of drinks in a week: 3  Interpretation: Low risk drinking behavior.    AUDIT-C Screenin) How often did you have a drink containing alcohol in the past year? 2 to 3 times a week  2) How many drinks did you have on a typical day when you were drinking in the past year? 1 to 2  3) How often did you have 6 or more drinks on one occasion in the past year? never    AUDIT-C Score: 3  Interpretation: Score 3-12 (female): POSITIVE screen for alcohol misuse    AUDIT Screenin) How often during the last year have you found that you were not able to stop drinking once you had started? 0 - never  5) How often during the last year have you failed to do what was normally expected from you because of drinking? 0 - never  6) How often during the last year have you needed a first drink in the morning to get yourself going after a heavy drinking session? 0 - never  7)  How often during the last year have you had a feeling of guilt or remorse after drinking? 0 - never  8) How often during the last year have you been unable to remember what happened the night before because you had been drinking? 0 - never  9) Have you or someone else been injured as a result of your drinking? 0 - no  10) Has a relative or friend or a doctor or another health worker been concerned about your drinking or suggested you cut down? 0 - no    AUDIT Score: 3  Interpretation: Low risk alcohol consumption    Single Item Drug Screening:  How often have you used an illegal drug (including marijuana) or a prescription medication for non-medical reasons in the past year? never    Single Item Drug Screen Score: 0  Interpretation: Negative screen for possible drug use disorder    Brief Intervention  Alcohol & drug use screenings were reviewed. No concerns regarding substance use disorder identified.     Social Determinants of Health     Financial Resource Strain: Low Risk  (8/21/2023)    Overall Financial Resource Strain (CARDIA)     Difficulty of Paying Living Expenses: Not hard at all   Food Insecurity: No Food Insecurity (9/4/2024)    Hunger Vital Sign     Worried About Running Out of Food in the Last Year: Never true     Ran Out of Food in the Last Year: Never true   Transportation Needs: No Transportation Needs (9/4/2024)    PRAPARE - Transportation     Lack of Transportation (Medical): No     Lack of Transportation (Non-Medical): No   Housing Stability: Unknown (9/4/2024)    Housing Stability Vital Sign     Unable to Pay for Housing in the Last Year: No     Homeless in the Last Year: No   Utilities: Not At Risk (9/4/2024)    [facility] Utilities     Threatened with loss of utilities: No     No Known Allergies    Immunization History   Administered Date(s) Administered    COVID-19 MODERNA VACC 0.25 ML IM BOOSTER 01/21/2021    COVID-19 MODERNA VACC 0.5 ML IM 01/21/2021, 03/02/2021, 03/02/2021, 09/20/2021, 03/30/2022,  "03/30/2022, 09/14/2022    Hepatitis A 07/03/2023    Hepatitis B 07/03/2023    INFLUENZA 09/16/2020, 10/28/2021, 10/28/2021    IPV 08/28/2023    Influenza, high dose seasonal 0.7 mL 09/16/2020    Influenza, seasonal, injectable 09/21/2010, 11/12/2012, 10/17/2016, 08/30/2017    MMR 07/03/2023    Meningococcal ACWY, unspecified 07/03/2023    Pneumococcal Conjugate 13-Valent 07/17/2017    Pneumococcal Polysaccharide PPV23 08/17/2020, 08/17/2020    Tdap 12/14/2017    Tuberculin Skin Test-PPD Intradermal 09/21/2010         Objective     /70 (BP Location: Left arm, Patient Position: Sitting, Cuff Size: Standard)   Pulse 78   Temp 98.7 °F (37.1 °C) (Temporal)   Resp 12   Ht 5' 6\" (1.676 m)   Wt 68.5 kg (151 lb)   SpO2 98%   BMI 24.37 kg/m²     Physical Exam  Vitals and nursing note reviewed.   Constitutional:       General: She is not in acute distress.     Appearance: Normal appearance.   Eyes:      General: No scleral icterus.     Conjunctiva/sclera: Conjunctivae normal.   Cardiovascular:      Rate and Rhythm: Normal rate and regular rhythm.   Pulmonary:      Effort: Pulmonary effort is normal. No respiratory distress.      Breath sounds: Normal breath sounds.   Abdominal:      General: Bowel sounds are normal.      Palpations: Abdomen is soft.      Tenderness: There is no abdominal tenderness.   Musculoskeletal:         General: Normal range of motion.      Cervical back: Neck supple.      Right lower leg: No edema.      Left lower leg: No edema.   Skin:     General: Skin is warm and dry.      Coloration: Skin is not jaundiced.      Findings: No erythema.   Neurological:      General: No focal deficit present.      Mental Status: She is alert and oriented to person, place, and time.      Cranial Nerves: No cranial nerve deficit.   Psychiatric:         Mood and Affect: Mood normal.         "

## 2024-09-17 ENCOUNTER — APPOINTMENT (OUTPATIENT)
Dept: LAB | Facility: CLINIC | Age: 74
End: 2024-09-17
Payer: COMMERCIAL

## 2024-09-17 DIAGNOSIS — E78.5 HYPERLIPIDEMIA, UNSPECIFIED HYPERLIPIDEMIA TYPE: ICD-10-CM

## 2024-09-17 DIAGNOSIS — E03.9 HYPOTHYROIDISM, UNSPECIFIED TYPE: ICD-10-CM

## 2024-09-17 DIAGNOSIS — Z13.0 SCREENING FOR DEFICIENCY ANEMIA: ICD-10-CM

## 2024-09-17 LAB
ALBUMIN SERPL BCG-MCNC: 4.2 G/DL (ref 3.5–5)
ALP SERPL-CCNC: 66 U/L (ref 34–104)
ALT SERPL W P-5'-P-CCNC: 16 U/L (ref 7–52)
ANION GAP SERPL CALCULATED.3IONS-SCNC: 7 MMOL/L (ref 4–13)
AST SERPL W P-5'-P-CCNC: 21 U/L (ref 13–39)
BILIRUB SERPL-MCNC: 0.85 MG/DL (ref 0.2–1)
BUN SERPL-MCNC: 20 MG/DL (ref 5–25)
CALCIUM SERPL-MCNC: 9.7 MG/DL (ref 8.4–10.2)
CHLORIDE SERPL-SCNC: 104 MMOL/L (ref 96–108)
CHOLEST SERPL-MCNC: 182 MG/DL
CO2 SERPL-SCNC: 27 MMOL/L (ref 21–32)
CREAT SERPL-MCNC: 0.72 MG/DL (ref 0.6–1.3)
ERYTHROCYTE [DISTWIDTH] IN BLOOD BY AUTOMATED COUNT: 13.2 % (ref 11.6–15.1)
GFR SERPL CREATININE-BSD FRML MDRD: 83 ML/MIN/1.73SQ M
GLUCOSE P FAST SERPL-MCNC: 94 MG/DL (ref 65–99)
HCT VFR BLD AUTO: 43.9 % (ref 34.8–46.1)
HDLC SERPL-MCNC: 79 MG/DL
HGB BLD-MCNC: 14.3 G/DL (ref 11.5–15.4)
LDLC SERPL CALC-MCNC: 80 MG/DL (ref 0–100)
LIPASE SERPL-CCNC: 29 U/L (ref 11–82)
MCH RBC QN AUTO: 32.1 PG (ref 26.8–34.3)
MCHC RBC AUTO-ENTMCNC: 32.6 G/DL (ref 31.4–37.4)
MCV RBC AUTO: 99 FL (ref 82–98)
PLATELET # BLD AUTO: 225 THOUSANDS/UL (ref 149–390)
PMV BLD AUTO: 14.2 FL (ref 8.9–12.7)
POTASSIUM SERPL-SCNC: 4.2 MMOL/L (ref 3.5–5.3)
PROT SERPL-MCNC: 6.7 G/DL (ref 6.4–8.4)
RBC # BLD AUTO: 4.45 MILLION/UL (ref 3.81–5.12)
SODIUM SERPL-SCNC: 138 MMOL/L (ref 135–147)
TRIGL SERPL-MCNC: 116 MG/DL
TSH SERPL DL<=0.05 MIU/L-ACNC: 2.63 UIU/ML (ref 0.45–4.5)
WBC # BLD AUTO: 7.95 THOUSAND/UL (ref 4.31–10.16)

## 2024-09-17 PROCEDURE — 85027 COMPLETE CBC AUTOMATED: CPT

## 2024-09-17 PROCEDURE — 83690 ASSAY OF LIPASE: CPT

## 2024-09-17 PROCEDURE — 36415 COLL VENOUS BLD VENIPUNCTURE: CPT

## 2024-09-17 PROCEDURE — 84443 ASSAY THYROID STIM HORMONE: CPT

## 2024-09-17 PROCEDURE — 80053 COMPREHEN METABOLIC PANEL: CPT

## 2024-09-17 PROCEDURE — 80061 LIPID PANEL: CPT

## 2024-10-04 PROBLEM — Z00.00 MEDICARE ANNUAL WELLNESS VISIT, SUBSEQUENT: Status: RESOLVED | Noted: 2020-09-07 | Resolved: 2024-10-04

## 2024-10-31 ENCOUNTER — TELEPHONE (OUTPATIENT)
Age: 74
End: 2024-10-31

## 2024-10-31 NOTE — TELEPHONE ENCOUNTER
Patient called stating that she called before and was advised to call back to check on Dr Ramirez's schedule.  Advised patient schedule is still not available and if she can call back to check on Dr Ramirez's schedule  Offered different location /different provider patient declined and wasn't happy

## 2024-11-19 ENCOUNTER — TELEPHONE (OUTPATIENT)
Dept: GASTROENTEROLOGY | Facility: CLINIC | Age: 74
End: 2024-11-19

## 2024-11-19 ENCOUNTER — OFFICE VISIT (OUTPATIENT)
Dept: GASTROENTEROLOGY | Facility: CLINIC | Age: 74
End: 2024-11-19
Payer: COMMERCIAL

## 2024-11-19 VITALS
WEIGHT: 152 LBS | HEART RATE: 73 BPM | HEIGHT: 66 IN | BODY MASS INDEX: 24.43 KG/M2 | SYSTOLIC BLOOD PRESSURE: 136 MMHG | DIASTOLIC BLOOD PRESSURE: 84 MMHG

## 2024-11-19 DIAGNOSIS — Z12.11 SCREENING FOR COLON CANCER: ICD-10-CM

## 2024-11-19 DIAGNOSIS — K21.00 GASTROESOPHAGEAL REFLUX DISEASE WITH ESOPHAGITIS WITHOUT HEMORRHAGE: Primary | ICD-10-CM

## 2024-11-19 DIAGNOSIS — Z86.0100 HISTORY OF COLON POLYPS: ICD-10-CM

## 2024-11-19 PROCEDURE — 99204 OFFICE O/P NEW MOD 45 MIN: CPT | Performed by: INTERNAL MEDICINE

## 2024-11-19 RX ORDER — FAMOTIDINE 20 MG/1
20 TABLET, FILM COATED ORAL 2 TIMES DAILY
COMMUNITY

## 2024-11-19 RX ORDER — CALCIUM CARBONATE 500 MG/1
1 TABLET, CHEWABLE ORAL DAILY
COMMUNITY

## 2024-11-19 RX ORDER — SODIUM CHLORIDE, SODIUM LACTATE, POTASSIUM CHLORIDE, CALCIUM CHLORIDE 600; 310; 30; 20 MG/100ML; MG/100ML; MG/100ML; MG/100ML
125 INJECTION, SOLUTION INTRAVENOUS CONTINUOUS
OUTPATIENT
Start: 2024-11-19

## 2024-11-19 NOTE — PROGRESS NOTES
Name: Suzanne Coelho      : 1950      MRN: [id number]  Encounter Provider: Adrian Barahona MD  Encounter Date: 2024   Encounter department: [facility]  :  Assessment & Plan  Gastroesophageal reflux disease with esophagitis without hemorrhage  -Currently on famotidine 20 mg once daily and Tums.  -Would recommend to increase famotidine to 20 mg twice daily.  -Avoid the use of NSAIDs.  -Follow GERD diet.  -Elevate the head of the bed to 45 degree angle.  -Avoid eating late at night.  -Would recommend EGD to assess for esophagitis/Isabel's esophagus, if there is evidence of Isabel's esophagus, would recommend PPI indefinitely.  -Xarelto will need to be held before the procedures, will obtain okay from cardiology, patient reports being on Xarelto for over 6 months now.  Patient follows with Dr. Mccoy at [facility] for cardiology care.    Orders:    EGD; Future    History of colon polyps  -Underwent colonoscopy in 2017 notable for several colon polyps, diverticulosis and internal hemorrhoids.  Pathology unknown.  No repeat colonoscopy since then.  -Would recommend colon cancer screening at this time given history of polyps and no colonoscopy in the past 7 years.  -Patient was explained about the risks and benefits of the procedure. Risks including but not limited to bleeding, infection, perforation were explained in detail. Also explained about less than 100% sensitivity with the exam and other alternatives.  -Recommend MiraLAX/Dulcolax bowel prep.    Orders:    Colonoscopy; Future    Screening for colon cancer  -See above.           History of Present Illness     HPI  Suzanne Coelho is a 74 y.o. female with history of hypothyroidism, hyperlipidemia, MVP, A-fib on Xarelto, presents for evaluation of epigastric abdominal pain, heartburn symptoms.  Patient reports that she has historically had symptoms of acid reflux on and off for many years however  over the past 6 months symptoms are daily.  Patient reports that that she requires taking Tums multiple times a day and ultimately takes Pepcid in the evening for control of symptoms.  Patient reports that she will intermittently take a Prilosec for 14 days when going on vacation which helps control the symptoms.  She does not take Prilosec long-term.  She denies hematemesis, coffee ground emesis or melena but does report occasional dysphagia to certain solid foods.  No progressive dysphagia.  No associated weight loss.  No change in bowel habits or hematochezia.  Reports having had a colonoscopy in 2017 with colon polyps, internal hemorrhoids and diverticulosis only.  Pathology is unknown, it is unclear when the recall colonoscopy was recommended.  She denies any family history of colon cancer or any other GI malignancy.  She reports occasional use of NSAIDs.    Patient reports that she underwent EGD in 1992 and was noted to have gastric ulcers.    Labs are reviewed, hemoglobin is normal, liver enzymes are normal, TSH is normal.      Review of Systems   Constitutional: Negative.    HENT: Negative.     Eyes: Negative.    Respiratory: Negative.     Cardiovascular: Negative.    Gastrointestinal:         See HPI.   Endocrine: Negative.    Genitourinary: Negative.    Musculoskeletal: Negative.    Skin: Negative.    Allergic/Immunologic: Negative.    Neurological: Negative.    Hematological: Negative.    Psychiatric/Behavioral: Negative.     All other systems reviewed and are negative.    Past Medical History   Past Medical History:   Diagnosis Date    A-fib (HCC) 2023    Endometriosis     last assessed 07/17/2017    GERD (gastroesophageal reflux disease) 2022    Hyperlipidemia 2022    Hypothyroidism     MVP (mitral valve prolapse) 1980     Past Surgical History:   Procedure Laterality Date    BREAST BIOPSY      Calcifications( negative)    COLONOSCOPY  2017    HYSTERECTOMY  01/17/1993     Family History   Problem  "Relation Age of Onset    Stroke Mother         CVA    Heart disease Mother     Hearing loss Mother     Vision loss Mother         Macular degeneration    Heart disease Father     Stroke Father     Breast cancer Sister     Heart disease Brother     COPD Brother     Thyroid disease Sister       reports that she has quit smoking. Her smoking use included cigarettes. She has a 2.5 pack-year smoking history. She has never used smokeless tobacco. She reports current alcohol use of about 5.0 standard drinks of alcohol per week. She reports that she does not use drugs.  Current Outpatient Medications on File Prior to Visit   Medication Sig Dispense Refill    Acetaminophen (TYLENOL 8 HOUR PO) Tylenol      calcium carbonate (TUMS) 500 mg chewable tablet Chew 1 tablet daily      calcium carbonate 1250 MG capsule Take 1,250 mg by mouth 2 (two) times a day with meals      Diclofenac Sodium (Voltaren) 1 % as directed Externally      famotidine (PEPCID) 20 mg tablet Take 20 mg by mouth 2 (two) times a day      levothyroxine 50 mcg tablet TAKE 1 TABLET DAILY IN THE EARLY MORNING 90 tablet 1    omega-3-acid ethyl esters (LOVAZA) 1 g capsule Take 2 g by mouth      rivaroxaban (Xarelto) 20 mg tablet Take 20 mg by mouth daily with breakfast      rosuvastatin (CRESTOR) 5 mg tablet TAKE 1 TABLET DAILY 90 tablet 1    aspirin 81 mg chewable tablet Chew daily      estradiol (VAGIFEM, YUVAFEM) 10 MCG TABS vaginal tablet Insert 1 tablet into the vagina       No current facility-administered medications on file prior to visit.   No Known Allergies        Objective   /84 (BP Location: Left arm, Patient Position: Sitting, Cuff Size: Standard)   Pulse 73   Ht 5' 6\" (1.676 m)   Wt 68.9 kg (152 lb)   BMI 24.53 kg/m²      Physical Exam  Vitals and nursing note reviewed.   Constitutional:       General: She is not in acute distress.     Appearance: She is well-developed.   HENT:      Head: Normocephalic and atraumatic.   Eyes:      General: " No scleral icterus.     Conjunctiva/sclera: Conjunctivae normal.   Cardiovascular:      Rate and Rhythm: Normal rate and regular rhythm.      Heart sounds: No murmur heard.  Pulmonary:      Effort: Pulmonary effort is normal. No respiratory distress.      Breath sounds: Normal breath sounds.   Abdominal:      Palpations: Abdomen is soft.      Tenderness: There is no abdominal tenderness.   Musculoskeletal:         General: No swelling.      Cervical back: Neck supple.   Skin:     General: Skin is warm and dry.   Neurological:      Mental Status: She is alert.   Psychiatric:         Mood and Affect: Mood normal.

## 2024-11-19 NOTE — ASSESSMENT & PLAN NOTE
-Currently on famotidine 20 mg once daily and Tums.  -Would recommend to increase famotidine to 20 mg twice daily.  -Avoid the use of NSAIDs.  -Follow GERD diet.  -Elevate the head of the bed to 45 degree angle.  -Avoid eating late at night.  -Would recommend EGD to assess for esophagitis/Isabel's esophagus, if there is evidence of Isabel's esophagus, would recommend PPI indefinitely.  -Xarelto will need to be held before the procedures, will obtain okay from cardiology, patient reports being on Xarelto for over 6 months now.  Patient follows with Dr. Mccoy at [facility] for cardiology care.    Orders:    EGD; Future

## 2024-11-19 NOTE — ASSESSMENT & PLAN NOTE
-Underwent colonoscopy in 2017 notable for several colon polyps, diverticulosis and internal hemorrhoids.  Pathology unknown.  No repeat colonoscopy since then.  -Would recommend colon cancer screening at this time given history of polyps and no colonoscopy in the past 7 years.  -Patient was explained about the risks and benefits of the procedure. Risks including but not limited to bleeding, infection, perforation were explained in detail. Also explained about less than 100% sensitivity with the exam and other alternatives.  -Recommend MiraLAX/Dulcolax bowel prep.    Orders:    Colonoscopy; Future

## 2024-11-21 NOTE — PROGRESS NOTES
Assessment/Plan:    1  Thyroid cyst  -     TSH, 3rd generation; Future    2  Hypothyroidism, unspecified type  -     Comprehensive metabolic panel; Future    3  Hyperlipidemia, unspecified hyperlipidemia type  -     Amylase; Future  -     Lipase; Future  -     Lipid Panel with Direct LDL reflex; Future  -     Comprehensive metabolic panel; Future  -     Magnesium; Future    4  Osteopenia, unspecified location  -     CBC; Future  -     Magnesium; Future    5  Palpitations  -     Ambulatory referral to Cardiology; Future  -     POCT ECG    6  Need for pneumococcal vaccination  -     PNEUMOCOCCAL POLYSACCHARIDE VACCINE 23-VALENT =>1YO SQ IM    7  Medicare annual wellness visit, subsequent    cont current meds  Follow-up post labs and cardio consult-sooner prn        Patient Instructions       Medicare Preventive Visit Patient Instructions  Thank you for completing your Welcome to Medicare Visit or Medicare Annual Wellness Visit today  Your next wellness visit will be due in one year (8/17/2021)  The screening/preventive services that you may require over the next 5-10 years are detailed below  Some tests may not apply to you based off risk factors and/or age  Screening tests ordered at today's visit but not completed yet may show as past due  Also, please note that scanned in results may not display below  Preventive Screenings:  Service Recommendations Previous Testing/Comments   Colorectal Cancer Screening  * Colonoscopy    * Fecal Occult Blood Test (FOBT)/Fecal Immunochemical Test (FIT)  * Fecal DNA/Cologuard Test  * Flexible Sigmoidoscopy Age: 54-65 years old   Colonoscopy: every 10 years (may be performed more frequently if at higher risk)  OR  FOBT/FIT: every 1 year  OR  Cologuard: every 3 years  OR  Sigmoidoscopy: every 5 years  Screening may be recommended earlier than age 48 if at higher risk for colorectal cancer   Also, an individualized decision between you and your healthcare provider will decide whether screening between the ages of 74-80 would be appropriate  Colonoscopy: 11/27/2017  FOBT/FIT: Not on file  Cologuard: Not on file  Sigmoidoscopy: Not on file    Screening Current     Breast Cancer Screening Age: 36 years old  Frequency: every 1-2 years  Not required if history of left and right mastectomy Mammogram: 08/23/2019    Screening Current   Cervical Cancer Screening Between the ages of 21-29, pap smear recommended once every 3 years  Between the ages of 33-67, can perform pap smear with HPV co-testing every 5 years  Recommendations may differ for women with a history of total hysterectomy, cervical cancer, or abnormal pap smears in past  Pap Smear: Not on file    Screening Not Indicated   Hepatitis C Screening Once for adults born between 1945 and 1965  More frequently in patients at high risk for Hepatitis C Hep C Antibody: 07/23/2018    Screening Current   Diabetes Screening 1-2 times per year if you're at risk for diabetes or have pre-diabetes Fasting glucose: No results in last 5 years   A1C: 5 4 %    Screening Current   Cholesterol Screening Once every 5 years if you don't have a lipid disorder  May order more often based on risk factors  Lipid panel: 08/20/2019    Screening Current     Other Preventive Screenings Covered by Medicare:  1  Abdominal Aortic Aneurysm (AAA) Screening: covered once if your at risk  You're considered to be at risk if you have a family history of AAA  2  Lung Cancer Screening: covers low dose CT scan once per year if you meet all of the following conditions: (1) Age 50-69; (2) No signs or symptoms of lung cancer; (3) Current smoker or have quit smoking within the last 15 years; (4) You have a tobacco smoking history of at least 30 pack years (packs per day multiplied by number of years you smoked); (5) You get a written order from a healthcare provider    3  Glaucoma Screening: covered annually if you're considered high risk: (1) You have diabetes OR (2) Family history of glaucoma OR (3)  aged 48 and older OR (3)  American aged 72 and older  3  Osteoporosis Screening: covered every 2 years if you meet one of the following conditions: (1) You're estrogen deficient and at risk for osteoporosis based off medical history and other findings; (2) Have a vertebral abnormality; (3) On glucocorticoid therapy for more than 3 months; (4) Have primary hyperparathyroidism; (5) On osteoporosis medications and need to assess response to drug therapy  · Last bone density test (DXA Scan): 08/23/2017  5  HIV Screening: covered annually if you're between the age of 12-76  Also covered annually if you are younger than 13 and older than 72 with risk factors for HIV infection  For pregnant patients, it is covered up to 3 times per pregnancy  Immunizations:  Immunization Recommendations   Influenza Vaccine Annual influenza vaccination during flu season is recommended for all persons aged >= 6 months who do not have contraindications   Pneumococcal Vaccine (Prevnar and Pneumovax)  * Prevnar = PCV13  * Pneumovax = PPSV23   Adults 25-60 years old: 1-3 doses may be recommended based on certain risk factors  Adults 72 years old: Prevnar (PCV13) vaccine recommended followed by Pneumovax (PPSV23) vaccine  If already received PPSV23 since turning 65, then PCV13 recommended at least one year after PPSV23 dose  Hepatitis B Vaccine 3 dose series if at intermediate or high risk (ex: diabetes, end stage renal disease, liver disease)   Tetanus (Td) Vaccine - COST NOT COVERED BY MEDICARE PART B Following completion of primary series, a booster dose should be given every 10 years to maintain immunity against tetanus  Td may also be given as tetanus wound prophylaxis  Tdap Vaccine - COST NOT COVERED BY MEDICARE PART B Recommended at least once for all adults  For pregnant patients, recommended with each pregnancy     Shingles Vaccine (Shingrix) - COST NOT COVERED BY MEDICARE PART B  2 shot series recommended in those aged 48 and above     Health Maintenance Due:      Topic Date Due    MAMMOGRAM  08/23/2020    Hepatitis C Screening  Completed     Immunizations Due:      Topic Date Due    Pneumococcal Vaccine: 65+ Years (2 of 2 - PPSV23) 07/17/2018    Influenza Vaccine  07/01/2020     Advance Directives   What are advance directives? Advance directives are legal documents that state your wishes and plans for medical care  These plans are made ahead of time in case you lose your ability to make decisions for yourself  Advance directives can apply to any medical decision, such as the treatments you want, and if you want to donate organs  What are the types of advance directives? There are many types of advance directives, and each state has rules about how to use them  You may choose a combination of any of the following:  · Living will: This is a written record of the treatment you want  You can also choose which treatments you do not want, which to limit, and which to stop at a certain time  This includes surgery, medicine, IV fluid, and tube feedings  · Durable power of  for healthcare [facility]): This is a written record that states who you want to make healthcare choices for you when you are unable to make them for yourself  This person, called a proxy, is usually a family member or a friend  You may choose more than 1 proxy  · Do not resuscitate (DNR) order:  A DNR order is used in case your heart stops beating or you stop breathing  It is a request not to have certain forms of treatment, such as CPR  A DNR order may be included in other types of advance directives  · Medical directive: This covers the care that you want if you are in a coma, near death, or unable to make decisions for yourself  You can list the treatments you want for each condition   Treatment may include pain medicine, surgery, blood transfusions, dialysis, IV or tube feedings, and a ventilator (breathing machine)  · Values history: This document has questions about your views, beliefs, and how you feel and think about life  This information can help others choose the care that you would choose  Why are advance directives important? An advance directive helps you control your care  Although spoken wishes may be used, it is better to have your wishes written down  Spoken wishes can be misunderstood, or not followed  Treatments may be given even if you do not want them  An advance directive may make it easier for your family to make difficult choices about your care  © Copyright [employer] 2018 Information is for End User's use only and may not be sold, redistributed or otherwise used for commercial purposes  All illustrations and images included in CareNotes® are the copyrighted property of A D A M , Inc  or [address] [place] [address] [facility]          Subjective:      Patient ID: Janet Duran is a 78 y o  female  Chief Complaint   Patient presents with    Medicare Wellness Visit     sub    Immunizations     pneumonia 21       HPI  77 yo patient in for follow-up and wellness check  Is due for pneumococcal 23  Follows with outside gyn  Has history of hysterectomy  Eye and dental care up-to-date  Is due for labs  Does complain of some fatigue and intermittent palpitations  Denies chest pain or exertional shortness of breath  No syncope or associated GI symptoms  History MVP  Had echo in 2017 essentially normal EF  The following portions of the patient's history were reviewed and updated as appropriate: allergies, current medications, past family history, past medical history, past social history, past surgical history and problem list     Review of Systems   Constitutional: Positive for fatigue  Negative for fever  Cardiovascular: Positive for palpitations  Gastrointestinal: Negative  Endocrine:        Hx thyroid d/o   Musculoskeletal: Positive for arthralgias  Skin: Negative  Allergic/Immunologic: Positive for environmental allergies  Neurological: Negative  Psychiatric/Behavioral: Positive for sleep disturbance  Current Outpatient Medications   Medication Sig Dispense Refill    aspirin 81 mg chewable tablet Chew daily      calcium carbonate 1250 MG capsule Take 1,250 mg by mouth 2 (two) times a day with meals      estradiol (VAGIFEM) 10 MCG TABS vaginal tablet Insert into the vagina      levothyroxine 25 mcg tablet TAKE 1 TABLET DAILY 90 tablet 1    tretinoin (RETIN-A) 0 025 % cream APPLY AT NIGHT AS DIRECTED  1    valACYclovir (VALTREX) 500 mg tablet Use as directed prn 30 tablet 3    rosuvastatin (CRESTOR) 5 mg tablet Take 1 tablet (5 mg total) by mouth daily 90 tablet 1     No current facility-administered medications for this visit  Objective:    /70 (BP Location: Left arm, Patient Position: Sitting, Cuff Size: Standard)   Pulse 56 Comment: irregular  Temp 99 1 °F (37 3 °C)   Resp 14   Ht 5' 6 25" (1 683 m)   Wt 69 5 kg (153 lb 3 2 oz)   BMI 24 54 kg/m²        Physical Exam  Vitals signs and nursing note reviewed  Constitutional:       General: She is not in acute distress  Appearance: Normal appearance  She is normal weight  Eyes:      General: No scleral icterus  Conjunctiva/sclera: Conjunctivae normal    Neck:      Musculoskeletal: Neck supple  Cardiovascular:      Rate and Rhythm: Normal rate and regular rhythm  Pulses: Normal pulses  Pulmonary:      Effort: Pulmonary effort is normal  No respiratory distress  Breath sounds: Normal breath sounds  Abdominal:      General: Bowel sounds are normal       Palpations: Abdomen is soft  Tenderness: There is no abdominal tenderness  Musculoskeletal: Normal range of motion  Skin:     General: Skin is warm and dry  Findings: No rash  Neurological:      General: No focal deficit present        Mental Status: She is alert and oriented to person, place, and time       Cranial Nerves: No cranial nerve deficit     Psychiatric:         Mood and Affect: Mood normal         Helen Yoder MD General

## 2024-12-22 DIAGNOSIS — E03.9 HYPOTHYROIDISM, UNSPECIFIED TYPE: ICD-10-CM

## 2024-12-22 DIAGNOSIS — E78.5 HYPERLIPIDEMIA, UNSPECIFIED HYPERLIPIDEMIA TYPE: ICD-10-CM

## 2024-12-24 RX ORDER — LEVOTHYROXINE SODIUM 50 UG/1
TABLET ORAL
Qty: 90 TABLET | Refills: 1 | Status: SHIPPED | OUTPATIENT
Start: 2024-12-24

## 2024-12-24 RX ORDER — ROSUVASTATIN CALCIUM 5 MG/1
5 TABLET, COATED ORAL DAILY
Qty: 90 TABLET | Refills: 1 | Status: SHIPPED | OUTPATIENT
Start: 2024-12-24

## 2025-01-06 ENCOUNTER — TELEPHONE (OUTPATIENT)
Dept: GASTROENTEROLOGY | Facility: CLINIC | Age: 75
End: 2025-01-06

## 2025-01-06 NOTE — TELEPHONE ENCOUNTER
Ended up calling pt to find out info regarding new cardiologist. Spoke with pt who informed me her new cardiologist is Dr Lou Howell at [facility] in [facility].    P: [phone]    F: [phone]     I sent an urgent fax over to Dr Howell's office regarding Xarelto clearance. Pt expressed she will be seeing the nurse practitioner today, and will mention the clearance at her appt today. Will look out for reply from office.

## 2025-01-06 NOTE — TELEPHONE ENCOUNTER
Pt calling back to advise cardio office did not receive clearance form. Confirmed fax# [phone]. Spoke w/Lary at [place] clerical, will fax over clearance to correct fax#.

## 2025-01-06 NOTE — TELEPHONE ENCOUNTER
Sent fax over again, and called number on file for office to confirm. Spoke to Dianelys, clinical staff, who confirmed fax did go thru

## 2025-01-08 ENCOUNTER — APPOINTMENT (OUTPATIENT)
Dept: LAB | Facility: CLINIC | Age: 75
End: 2025-01-08
Payer: COMMERCIAL

## 2025-01-16 ENCOUNTER — ANESTHESIA (OUTPATIENT)
Dept: GASTROENTEROLOGY | Facility: AMBULARY SURGERY CENTER | Age: 75
End: 2025-01-16
Payer: COMMERCIAL

## 2025-01-16 ENCOUNTER — HOSPITAL ENCOUNTER (OUTPATIENT)
Dept: GASTROENTEROLOGY | Facility: AMBULARY SURGERY CENTER | Age: 75
Setting detail: OUTPATIENT SURGERY
End: 2025-01-16
Attending: INTERNAL MEDICINE
Payer: COMMERCIAL

## 2025-01-16 VITALS
TEMPERATURE: 97.8 F | DIASTOLIC BLOOD PRESSURE: 56 MMHG | OXYGEN SATURATION: 98 % | SYSTOLIC BLOOD PRESSURE: 114 MMHG | RESPIRATION RATE: 18 BRPM | HEART RATE: 59 BPM

## 2025-01-16 DIAGNOSIS — K21.00 GASTROESOPHAGEAL REFLUX DISEASE WITH ESOPHAGITIS WITHOUT HEMORRHAGE: ICD-10-CM

## 2025-01-16 DIAGNOSIS — Z86.0100 HISTORY OF COLON POLYPS: ICD-10-CM

## 2025-01-16 PROCEDURE — 88305 TISSUE EXAM BY PATHOLOGIST: CPT | Performed by: PATHOLOGY

## 2025-01-16 PROCEDURE — 43239 EGD BIOPSY SINGLE/MULTIPLE: CPT | Performed by: INTERNAL MEDICINE

## 2025-01-16 PROCEDURE — G0105 COLORECTAL SCRN; HI RISK IND: HCPCS | Performed by: INTERNAL MEDICINE

## 2025-01-16 RX ORDER — LIDOCAINE HYDROCHLORIDE 10 MG/ML
INJECTION, SOLUTION EPIDURAL; INFILTRATION; INTRACAUDAL; PERINEURAL AS NEEDED
Status: DISCONTINUED | OUTPATIENT
Start: 2025-01-16 | End: 2025-01-16

## 2025-01-16 RX ORDER — LOSARTAN POTASSIUM 100 MG/1
100 TABLET ORAL DAILY
COMMUNITY

## 2025-01-16 RX ORDER — PROPOFOL 10 MG/ML
INJECTION, EMULSION INTRAVENOUS CONTINUOUS PRN
Status: DISCONTINUED | OUTPATIENT
Start: 2025-01-16 | End: 2025-01-16

## 2025-01-16 RX ORDER — SODIUM CHLORIDE, SODIUM LACTATE, POTASSIUM CHLORIDE, CALCIUM CHLORIDE 600; 310; 30; 20 MG/100ML; MG/100ML; MG/100ML; MG/100ML
125 INJECTION, SOLUTION INTRAVENOUS CONTINUOUS
Status: DISCONTINUED | OUTPATIENT
Start: 2025-01-16 | End: 2025-01-20 | Stop reason: HOSPADM

## 2025-01-16 RX ORDER — SODIUM CHLORIDE, SODIUM LACTATE, POTASSIUM CHLORIDE, CALCIUM CHLORIDE 600; 310; 30; 20 MG/100ML; MG/100ML; MG/100ML; MG/100ML
INJECTION, SOLUTION INTRAVENOUS CONTINUOUS PRN
Status: DISCONTINUED | OUTPATIENT
Start: 2025-01-16 | End: 2025-01-16

## 2025-01-16 RX ORDER — PROPOFOL 10 MG/ML
INJECTION, EMULSION INTRAVENOUS AS NEEDED
Status: DISCONTINUED | OUTPATIENT
Start: 2025-01-16 | End: 2025-01-16

## 2025-01-16 RX ORDER — PANTOPRAZOLE SODIUM 40 MG/1
40 TABLET, DELAYED RELEASE ORAL 2 TIMES DAILY
Qty: 180 TABLET | Refills: 0 | Status: SHIPPED | OUTPATIENT
Start: 2025-01-16 | End: 2025-04-16

## 2025-01-16 RX ADMIN — Medication 40 MG: at 13:28

## 2025-01-16 RX ADMIN — PROPOFOL 100 MG: 10 INJECTION, EMULSION INTRAVENOUS at 13:03

## 2025-01-16 RX ADMIN — SODIUM CHLORIDE, SODIUM LACTATE, POTASSIUM CHLORIDE, AND CALCIUM CHLORIDE: .6; .31; .03; .02 INJECTION, SOLUTION INTRAVENOUS at 12:57

## 2025-01-16 RX ADMIN — PROPOFOL 120 MCG/KG/MIN: 10 INJECTION, EMULSION INTRAVENOUS at 13:03

## 2025-01-16 RX ADMIN — LIDOCAINE HYDROCHLORIDE 50 MG: 10 INJECTION, SOLUTION EPIDURAL; INFILTRATION; INTRACAUDAL; PERINEURAL at 13:03

## 2025-01-16 RX ADMIN — PROPOFOL 40 MG: 10 INJECTION, EMULSION INTRAVENOUS at 13:13

## 2025-01-16 NOTE — ANESTHESIA POSTPROCEDURE EVALUATION
Post-Op Assessment Note    CV Status:  Stable  Pain Score: 0    Pain management: adequate       Mental Status:  Awake   Hydration Status:  Stable   PONV Controlled:  None   Airway Patency:  Patent     Post Op Vitals Reviewed: Yes    No anethesia notable event occurred.    Staff: CRNA           Last Filed PACU Vitals:  Vitals Value Taken Time   Temp     Pulse 70    /54    Resp 14    SpO2 99

## 2025-01-16 NOTE — ANESTHESIA PREPROCEDURE EVALUATION
Procedure:  COLONOSCOPY  EGD    Relevant Problems   CARDIO   (+) Heart murmur   (+) Hyperlipidemia   (+) Mitral valve prolapse   (+) SVT (supraventricular tachycardia) (HCC)      ENDO   (+) Hypothyroidism      GI/HEPATIC   (+) Gastroesophageal reflux disease with esophagitis without hemorrhage      MUSCULOSKELETAL   (+) Arthritis   (+) Osteoarthritis of both knees        Physical Exam    Airway    Mallampati score: II  TM Distance: >3 FB  Neck ROM: full     Dental   Comment: Denies loose teeth     Cardiovascular  Cardiovascular exam normal    Pulmonary  Pulmonary exam normal     Other Findings  Portions of exam deferred due to low yield and/or unknown COVID statuspost-pubertal.      Anesthesia Plan  ASA Score- 3     Anesthesia Type- IV sedation with anesthesia with ASA Monitors.         Additional Monitors:     Airway Plan:            Plan Factors-Exercise tolerance (METS): >4 METS.    Chart reviewed.   Existing labs reviewed. Patient summary reviewed.    Patient is not a current smoker.              Induction- intravenous.    Postoperative Plan-         Informed Consent- Anesthetic plan and risks discussed with patient.  I personally reviewed this patient with the CRNA. Discussed and agreed on the Anesthesia Plan with the CRNA..      NPO Status:  Vitals Value Taken Time   Date of last liquid 01/16/25 01/16/25 1215   Time of last liquid 0900 01/16/25 1215   Date of last solid 01/14/25 01/16/25 1215   Time of last solid 1800 01/16/25 1215

## 2025-01-16 NOTE — PERIOPERATIVE NURSING NOTE
Report given to TEA Martinez. Bed locked in lowest position with side rails up and call bell within reach.

## 2025-01-16 NOTE — H&P
History and Physical -  [facility]  Suzanne Coelho 74 y.o. female MRN: [id number]    HPI: Suzanne Coelho is a 74 y.o. year old female who presents for colon cancer screening evaluation and for evaluation of GERD.      Review of Systems    Historical Information   Past Medical History:   Diagnosis Date    A-fib (HCC) 2023    Endometriosis     last assessed 07/17/2017    GERD (gastroesophageal reflux disease) 2022    Hyperlipidemia 2022    Hypothyroidism     MVP (mitral valve prolapse) 1980     Past Surgical History:   Procedure Laterality Date    BREAST BIOPSY      Calcifications( negative)    COLONOSCOPY  2017    HYSTERECTOMY  01/17/1993     Social History   Social History     Substance and Sexual Activity   Alcohol Use Yes    Alcohol/week: 5.0 standard drinks of alcohol    Types: 5 Glasses of wine per week    Comment: 4x weekly glass of wine     Social History     Substance and Sexual Activity   Drug Use No     Social History     Tobacco Use   Smoking Status Former    Current packs/day: 0.25    Average packs/day: 0.3 packs/day for 10.0 years (2.5 ttl pk-yrs)    Types: Cigarettes   Smokeless Tobacco Never     Family History   Problem Relation Age of Onset    Stroke Mother         CVA    Heart disease Mother     Hearing loss Mother     Vision loss Mother         Macular degeneration    Heart disease Father     Stroke Father     Breast cancer Sister     Heart disease Brother     COPD Brother     Thyroid disease Sister        Meds/Allergies     Not in a hospital admission.    No Known Allergies    Objective     /72   Pulse 78   Temp 97.8 °F (36.6 °C) (Skin)   Resp 16   SpO2 98%       PHYSICAL EXAM    Gen: NAD  CV: RRR  CHEST: Clear  ABD: soft, NT/ND  EXT: no edema  Neuro: AAO      ASSESSMENT/PLAN:  This is a 74 y.o. year old female here for evaluation of GERD and colon cancer screening.    PLAN:   Procedure: EGD and colonoscopy.

## 2025-01-20 ENCOUNTER — TELEPHONE (OUTPATIENT)
Age: 75
End: 2025-01-20

## 2025-01-20 ENCOUNTER — RESULTS FOLLOW-UP (OUTPATIENT)
Dept: GASTROENTEROLOGY | Facility: AMBULARY SURGERY CENTER | Age: 75
End: 2025-01-20

## 2025-01-20 ENCOUNTER — PREP FOR PROCEDURE (OUTPATIENT)
Age: 75
End: 2025-01-20

## 2025-01-20 DIAGNOSIS — K21.00 GASTROESOPHAGEAL REFLUX DISEASE WITH ESOPHAGITIS WITHOUT HEMORRHAGE: Primary | ICD-10-CM

## 2025-01-20 PROCEDURE — 88305 TISSUE EXAM BY PATHOLOGIST: CPT | Performed by: PATHOLOGY

## 2025-01-20 NOTE — TELEPHONE ENCOUNTER
Patient calling to schedule 3 month follow up EGD.    Scheduled date of EGD(as of today): 4/16/25  Physician performing EGD:   Location of EGD: [facility]  Instructions reviewed with patient by: md  Clearances:  Patient takes Xarelto - received Xarelto hold on 1/7/25 for 2 day hold - Please advise if same hold      OA Questions for EGD  Date: [1/20/25]  Screened by: [md]     Pre-Screening: BMI [24.53]    Past EGD? If yes - Date: [1/16/25]  Physician/Facility: [/[facility]]  Reason: [Gastroesophageal reflux disease with esophagitis without hemorrhage]     SCHEDULING STAFF: If the patient is over 75 years old, please schedule an office visit.  ·      Does the patient want to see a gastroenterologist prior to their procedure to discuss any GI symptoms? no  ·      Has the patient been hospitalized or had abdominal surgery in the past 6 months?no  ·      Does the patient use supplemental oxygen?no  ·      Does the patient take [Coumadin], [Lovenox], [Plavix], [Eliquis], [Xarelto], or other blood thinning medication? Yes - had office visit  ·      Has the patient had a stroke, cardiac event, or stent placed in the past year? no

## 2025-01-20 NOTE — LETTER
January 20, 2025    Suzanne Coelho  [address] [address]      Dear Ms. Coelho:    Below are your prep instructions for your upcoming procedure on 4/16/25. If you have any questions or concerns please contact us at [phone].    Thank you,     [facility]            Medicine Instructions for Adults with Diabetes (NO Bowel Prep)      Follow these instructions when a BOWEL PREP is NOT required for your procedure or surgery!    NOTE:  GLP-1 Agonists taken weekly: do not take in the 7 days before your procedure  SGLT-2 Inhibitors: do not take in the 4 days before your procedure    On the Day Before Surgery/Procedure  If you are having a procedure (e.g. Colonoscopy) or surgery which DOES NOT require a bowel prep, follow the directions below based on the type of medicine you take for your diabetes.    Type of Medicine You Take Examples What to Do   Pre-Mixed Insulin - Intermediate Acting Humalog® 75/25, Humulin® 70/30, Novolog® 70/30, Regular Insulin Take ½ your regular dose the evening before your procedure.   Rapid/Fast Acting Insulin/Long-Acting Insulin Humalog® U200, NovoLog®, Apidra®, Lantus®, Levemir®, Tresiba®, Toujeo®, Fiasp®, Basaglar® Take your FULL regular dose the day before procedure.   Oral Diabetic Medicines (sulfonylurea) Glipizide/Glimepiride/Glucotrol® Take your regular dose with dinner the evening before your procedure.   Other Oral Diabetic Medicines   Metformin®, Glucophage®, Glucophage XR®, Riomet®, Glumetza®), Actose®, Avandia®, Glyset®, Prandin® Take your regular dose with dinner the evening before your procedure   GLP-1 Agonists Adlyxin®, Byetta®, Bydureon®, Ozempic®, Soliqua®, Tanzeum®, Trulicity®, Victoza®, Saxenda®, Rybelsus® If taken daily, take as normal    If taken weekly, do not take this medicine for 7 days before your procedure including the day of the procedure (resume taking after the procedure)   SGLT-2  Inhibitors Jardiance®, Invokana®, Farxiga®,   Steglatro®, Brenzavvy®, Qtern®, Segluromet®, Glyxambi®, Synjardy®, Synjardy XR®, Invokamet®, Invokamet XR®, Trijary XR®, Xigduo XR®, Steglujan® Do not take for 4 days before your procedure including the day of the procedure (resume taking after the procedure)                 More information continued on back                  Medicine Instructions for Adults with Diabetes (No Bowel Prep)   Page 2      On the Day of Surgery/Procedure  Follow the directions below based on the type of medicine you take for your diabetes.    Type of Medicine You Take Examples What to Do   Long-Acting Insulin Lantus®, Levemir®, Tresiba®, Toujeo®, Basaglar®, Semglee® If you normally take your Long-Acting Insulin in the morning, take the full dose as scheduled.   GLP-1 Agonists AdlyxinÒ, ByettaÒ, BydureonÒ, OzempicÒ, SoliquaÒ, TanzeumÒ, TrulicityÒ, VictozaÒ, Saxenda®, Rybelsus® Do NOT take this medicine on the day of your procedure (resume taking after the procedure)       On the Day of Surgery/Procedure (continued)  Except for the morning Long-Acting Insulin, DO NOT take ANY diabetic medicine on the day of your procedure unless you were instructed by the doctor who manages your diabetes medicines.    Continue to check your blood sugars.  If you have an insulin pump, ask your endocrinologist for instructions at least 3 days before your procedure. NOTE: If you are not able to ask your endocrinologist in advance, on the day of the procedure set your insulin pump to your basal rate only. Bring your insulin pump supplies to the hospital.     If you have any questions about taking your diabetes medicines prior to your procedure, please contact the doctor who manages your diabetes medicines.

## 2025-01-22 ENCOUNTER — APPOINTMENT (OUTPATIENT)
Dept: LAB | Facility: CLINIC | Age: 75
End: 2025-01-22
Payer: COMMERCIAL

## 2025-01-22 DIAGNOSIS — I10 ESSENTIAL HYPERTENSION, MALIGNANT: ICD-10-CM

## 2025-01-22 DIAGNOSIS — I48.0 PAROXYSMAL ATRIAL FIBRILLATION (HCC): ICD-10-CM

## 2025-01-22 LAB — PTH-INTACT SERPL-MCNC: 63.1 PG/ML (ref 12–88)

## 2025-01-22 PROCEDURE — 83970 ASSAY OF PARATHORMONE: CPT

## 2025-01-22 PROCEDURE — 36415 COLL VENOUS BLD VENIPUNCTURE: CPT

## 2025-01-22 PROCEDURE — 84244 ASSAY OF RENIN: CPT

## 2025-01-22 PROCEDURE — 82088 ASSAY OF ALDOSTERONE: CPT

## 2025-01-28 ENCOUNTER — OFFICE VISIT (OUTPATIENT)
Age: 75
End: 2025-01-28
Payer: COMMERCIAL

## 2025-01-28 VITALS — HEIGHT: 66 IN | TEMPERATURE: 97.9 F | BODY MASS INDEX: 24.27 KG/M2 | WEIGHT: 151 LBS

## 2025-01-28 DIAGNOSIS — L81.4 SOLAR LENTIGO: ICD-10-CM

## 2025-01-28 DIAGNOSIS — D22.70 MULTIPLE BENIGN MELANOCYTIC NEVI OF UPPER EXTREMITY, LOWER EXTREMITY, AND TRUNK: ICD-10-CM

## 2025-01-28 DIAGNOSIS — D22.5 MULTIPLE BENIGN MELANOCYTIC NEVI OF UPPER EXTREMITY, LOWER EXTREMITY, AND TRUNK: ICD-10-CM

## 2025-01-28 DIAGNOSIS — L82.1 SEBORRHEIC KERATOSES: ICD-10-CM

## 2025-01-28 DIAGNOSIS — S91.209A TRAUMATIC AVULSION OF NAIL PLATE OF TOE, INITIAL ENCOUNTER: ICD-10-CM

## 2025-01-28 DIAGNOSIS — D18.01 CHERRY ANGIOMA: Primary | ICD-10-CM

## 2025-01-28 DIAGNOSIS — D22.60 MULTIPLE BENIGN MELANOCYTIC NEVI OF UPPER EXTREMITY, LOWER EXTREMITY, AND TRUNK: ICD-10-CM

## 2025-01-28 PROCEDURE — 99213 OFFICE O/P EST LOW 20 MIN: CPT | Performed by: DERMATOLOGY

## 2025-01-28 RX ORDER — AMLODIPINE BESYLATE 5 MG/1
5 TABLET ORAL DAILY
COMMUNITY
Start: 2025-01-20 | End: 2026-01-21

## 2025-01-28 NOTE — PATIENT INSTRUCTIONS
SEBORRHEIC KERATOSES  - Relevant exam: Scattered over the trunk/extremities are waxy brown to black plaques and papules with stuck on appearance  - Exam and clinical history consistent with seborrheic keratoses  - Counseled that these are benign growths that do not require treatment  - Counseled that removal of lesions is considered cosmetic and so would incur a fee should patient elect to move forward.   - Patient to hold on treatments for now but will inform us should they desire additional treatments    MELANOCYTIC NEVI  -Relevant exam: Scattered over the trunk/extremities are homogenously pigmented brown macules and papules. ELM performed and without concerning findings.  - Exam and clinical history consistent with melanocytic nevi  - Educated on the ABCDE's of melanoma; handout provided  - Counseled to return to clinic prior to scheduled appointment should any of these lesions change or should any new lesions of concern arise  - Counseled on use of sun protection daily. Reviewed latest FDA sunscreen guidelines, including use of broad spectrum (UVA and UVB blocking) sunscreen or sun protective clothing with SPF 30-50 every 2-3 hours and reapplied after exposure to water; use of photoprotective clothing, including a broad brim hat and UPF rated clothing if outdoors for several hours; avoid use of tanning beds as these pose significant risk for melanoma and skin cancer.    LENTIGINES  OTHER SKIN CHANGES DUE TO CHRONIC EXPOSURE TO NONIONIZING RADIATION  - Relevant exam: Over sun exposed areas are brown macules. ELM performed and without concerning findings.  - Exam and clinical history consistent with lentigines.  - Educated that these are indicative of prior sun exposure.   - Counseled to return to clinic prior to scheduled appointment should any of these lesions change or should any new lesions of concern arise.  - Recommended use of sunscreen as above and below.  - Counseled on use of sun protection daily.  Reviewed latest FDA sunscreen guidelines, including use of broad spectrum (UVA and UVB blocking) sunscreen or sun protective clothing with SPF 30-50 every 2-3 hours and reapplied after exposure to water; use of photoprotective clothing, including a broad brim hat and UPF rated clothing if outdoors for several hours; avoid use of tanning beds as these pose significant risk for melanoma and skin cancer.    CHERRY ANGIOMAS  - Relevant exam: Scattered over the trunk/extremities are red papules  - Exam and clinical history consistent with cherry angiomas  - Educated that these are benign  - Educated that removal is considered aesthetic and would incur a fee.  - Patient does not wish to pursue removal at this time but will contact us should this change.      TRAUMATIZED NAIL    Assessment and Plan:  Based on a thorough discussion of this condition and the management approach to it (including a comprehensive discussion of the known risks, side effects and potential benefits of treatment), the patient (family) agrees to implement the following specific plan:  Reassured benign.  Monitor if no changes follow up with the office via My Chart.

## 2025-01-28 NOTE — PROGRESS NOTES
"[facility] Note     Patient Name: Suzanne Coelho  Encounter Date: 1/28/25     Have you been cared for by a [facility] Dermatologist in the last 3 years and, if so, which description applies to you?    Yes.  I have been here within the last 3 years, and my medical history has NOT changed since that time.  I am FEMALE/of child-bearing potential.    REVIEW OF SYSTEMS:  Have you recently had or currently have any of the following? No changes in my recent health.   PAST MEDICAL HISTORY:  Have you personally ever had or currently have any of the following?  If \"YES,\" then please provide more detail. No changes in my medical history.   HISTORY OF IMMUNOSUPPRESSION: Do you have a history of any of the following:  Systemic Immunosuppression such as Diabetes, Biologic or Immunotherapy, Chemotherapy, Organ Transplantation, Bone Marrow Transplantation or Prednisone?  No     Answering \"YES\" requires the addition of the dotphrase \"IMMUNOSUPPRESSED\" as the first diagnosis of the patient's visit.   FAMILY HISTORY:  Any \"first degree relatives\" (parent, brother, sister, or child) with the following?    No changes in my family's known health.   PATIENT EXPERIENCE:    Do you want the Dermatologist to perform a COMPLETE skin exam today including a clinical examination under the \"bra and underwear\" areas?  Yes  If necessary, do we have your permission to call and leave a detailed message on your Preferred Phone number that includes your specific medical information?  Yes      No Known Allergies   Current Outpatient Medications:   •  Acetaminophen (TYLENOL 8 HOUR PO), Tylenol, Disp: , Rfl:   •  amLODIPine (NORVASC) 5 mg tablet, Take 5 mg by mouth daily, Disp: , Rfl:   •  calcium carbonate (TUMS) 500 mg chewable tablet, Chew 1 tablet daily, Disp: , Rfl:   •  calcium carbonate 1250 MG capsule, Take 1,250 mg by mouth 2 (two) times a day with meals, Disp: , Rfl:   •  Diclofenac Sodium (Voltaren) 1 %, as directed " Externally, Disp: , Rfl:   •  levothyroxine 50 mcg tablet, TAKE 1 TABLET DAILY IN THE EARLY MORNING, Disp: 90 tablet, Rfl: 1  •  losartan (COZAAR) 100 MG tablet, Take 100 mg by mouth daily, Disp: , Rfl:   •  omega-3-acid ethyl esters (LOVAZA) 1 g capsule, Take 2 g by mouth, Disp: , Rfl:   •  pantoprazole (PROTONIX) 40 mg tablet, Take 1 tablet (40 mg total) by mouth 2 (two) times a day, Disp: 180 tablet, Rfl: 0  •  rivaroxaban (Xarelto) 20 mg tablet, Take 20 mg by mouth daily with breakfast, Disp: , Rfl:   •  rosuvastatin (CRESTOR) 5 mg tablet, TAKE 1 TABLET DAILY, Disp: 90 tablet, Rfl: 1  •  famotidine (PEPCID) 20 mg tablet, Take 20 mg by mouth 2 (two) times a day, Disp: , Rfl:           Whom besides the patient is providing clinical information about today's encounter?   NO ADDITIONAL HISTORIAN (patient alone provided history)    Physical Exam and Assessment/Plan by Diagnosis:    SEBORRHEIC KERATOSES  - Relevant exam: Scattered over the trunk/extremities are waxy brown to black plaques and papules with stuck on appearance  - Exam and clinical history consistent with seborrheic keratoses  - Counseled that these are benign growths that do not require treatment  - Counseled that removal of lesions is considered cosmetic and so would incur a fee should patient elect to move forward.   - Patient to hold on treatments for now but will inform us should they desire additional treatments    MELANOCYTIC NEVI  -Relevant exam: Scattered over the trunk/extremities are homogenously pigmented brown macules and papules. ELM performed and without concerning findings.  - Exam and clinical history consistent with melanocytic nevi  - Educated on the ABCDE's of melanoma; handout provided  - Counseled to return to clinic prior to scheduled appointment should any of these lesions change or should any new lesions of concern arise  - Counseled on use of sun protection daily. Reviewed latest FDA sunscreen guidelines, including use of broad  spectrum (UVA and UVB blocking) sunscreen or sun protective clothing with SPF 30-50 every 2-3 hours and reapplied after exposure to water; use of photoprotective clothing, including a broad brim hat and UPF rated clothing if outdoors for several hours; avoid use of tanning beds as these pose significant risk for melanoma and skin cancer.    LENTIGINES  OTHER SKIN CHANGES DUE TO CHRONIC EXPOSURE TO NONIONIZING RADIATION  - Relevant exam: Over sun exposed areas are brown macules. ELM performed and without concerning findings.  - Exam and clinical history consistent with lentigines.  - Educated that these are indicative of prior sun exposure.   - Counseled to return to clinic prior to scheduled appointment should any of these lesions change or should any new lesions of concern arise.  - Recommended use of sunscreen as above and below.  - Counseled on use of sun protection daily. Reviewed latest FDA sunscreen guidelines, including use of broad spectrum (UVA and UVB blocking) sunscreen or sun protective clothing with SPF 30-50 every 2-3 hours and reapplied after exposure to water; use of photoprotective clothing, including a broad brim hat and UPF rated clothing if outdoors for several hours; avoid use of tanning beds as these pose significant risk for melanoma and skin cancer.    CHERRY ANGIOMAS  - Relevant exam: Scattered over the trunk/extremities are red papules  - Exam and clinical history consistent with cherry angiomas  - Educated that these are benign  - Educated that removal is considered aesthetic and would incur a fee.  - Patient does not wish to pursue removal at this time but will contact us should this change.      TRAUMATIZED NAIL  Physical Exam:  Anatomic Location Affected:  left great toe nail  Morphological Description:  brown macule  Pertinent Positives:  Pertinent Negatives:    Additional History of Present Condition:  noticed in the summer after getting medicure.    Assessment and Plan:  Based on a  thorough discussion of this condition and the management approach to it (including a comprehensive discussion of the known risks, side effects and potential benefits of treatment), the patient (family) agrees to implement the following specific plan:  Reassured benign, notch place in nail, spot should grow out with notch.  If it does not, let us know.  It may take 3-6 months to grow out  Monitor if no changes follow up with the office via My Chart.     Scribe Attestation    I,:  Sabra Lester MA am acting as a scribe while in the presence of the attending physician.:       I,:  Tiffanie Forbes MD personally performed the services described in this documentation    as scribed in my presence.:

## 2025-01-29 LAB
ALDOST SERPL-MCNC: 11.1 NG/DL (ref 0–30)
ALDOST/RENIN PLAS-RTO: 14.4 {RATIO} (ref 0–30)
RENIN PLAS-CCNC: 0.77 NG/ML/HR (ref 0.17–5.38)

## 2025-03-31 ENCOUNTER — TELEPHONE (OUTPATIENT)
Dept: GASTROENTEROLOGY | Facility: AMBULARY SURGERY CENTER | Age: 75
End: 2025-03-31

## 2025-04-02 ENCOUNTER — ANESTHESIA EVENT (OUTPATIENT)
Dept: ANESTHESIOLOGY | Facility: HOSPITAL | Age: 75
End: 2025-04-02

## 2025-04-02 ENCOUNTER — ANESTHESIA (OUTPATIENT)
Dept: ANESTHESIOLOGY | Facility: HOSPITAL | Age: 75
End: 2025-04-02

## 2025-04-13 DIAGNOSIS — K21.00 GASTROESOPHAGEAL REFLUX DISEASE WITH ESOPHAGITIS WITHOUT HEMORRHAGE: ICD-10-CM

## 2025-04-14 RX ORDER — PANTOPRAZOLE SODIUM 40 MG/1
40 TABLET, DELAYED RELEASE ORAL 2 TIMES DAILY
Qty: 180 TABLET | Refills: 1 | Status: SHIPPED | OUTPATIENT
Start: 2025-04-14

## 2025-04-15 RX ORDER — SODIUM CHLORIDE, SODIUM LACTATE, POTASSIUM CHLORIDE, CALCIUM CHLORIDE 600; 310; 30; 20 MG/100ML; MG/100ML; MG/100ML; MG/100ML
75 INJECTION, SOLUTION INTRAVENOUS CONTINUOUS
Status: CANCELLED | OUTPATIENT
Start: 2025-04-15

## 2025-04-16 ENCOUNTER — ANESTHESIA EVENT (OUTPATIENT)
Dept: GASTROENTEROLOGY | Facility: AMBULARY SURGERY CENTER | Age: 75
End: 2025-04-16
Payer: COMMERCIAL

## 2025-04-16 ENCOUNTER — HOSPITAL ENCOUNTER (OUTPATIENT)
Dept: GASTROENTEROLOGY | Facility: AMBULARY SURGERY CENTER | Age: 75
Setting detail: OUTPATIENT SURGERY
Discharge: HOME | End: 2025-04-16
Attending: INTERNAL MEDICINE
Payer: COMMERCIAL

## 2025-04-16 VITALS
TEMPERATURE: 97.8 F | OXYGEN SATURATION: 97 % | SYSTOLIC BLOOD PRESSURE: 115 MMHG | BODY MASS INDEX: 24.27 KG/M2 | WEIGHT: 151 LBS | DIASTOLIC BLOOD PRESSURE: 76 MMHG | HEIGHT: 66 IN | RESPIRATION RATE: 16 BRPM | HEART RATE: 54 BPM

## 2025-04-16 DIAGNOSIS — K31.83 ACHLORHYDRIA: ICD-10-CM

## 2025-04-16 DIAGNOSIS — K21.00 GASTROESOPHAGEAL REFLUX DISEASE WITH ESOPHAGITIS WITHOUT HEMORRHAGE: ICD-10-CM

## 2025-04-16 PROBLEM — I48.91 ATRIAL FIBRILLATION (HCC): Status: ACTIVE | Noted: 2025-04-16

## 2025-04-16 PROCEDURE — 43239 EGD BIOPSY SINGLE/MULTIPLE: CPT | Performed by: INTERNAL MEDICINE

## 2025-04-16 PROCEDURE — 88305 TISSUE EXAM BY PATHOLOGIST: CPT | Performed by: PATHOLOGY

## 2025-04-16 PROCEDURE — 88313 SPECIAL STAINS GROUP 2: CPT | Performed by: PATHOLOGY

## 2025-04-16 RX ORDER — SODIUM CHLORIDE, SODIUM LACTATE, POTASSIUM CHLORIDE, CALCIUM CHLORIDE 600; 310; 30; 20 MG/100ML; MG/100ML; MG/100ML; MG/100ML
75 INJECTION, SOLUTION INTRAVENOUS CONTINUOUS
Status: DISCONTINUED | OUTPATIENT
Start: 2025-04-16 | End: 2025-04-20 | Stop reason: HOSPADM

## 2025-04-16 RX ORDER — LIDOCAINE HYDROCHLORIDE 10 MG/ML
INJECTION, SOLUTION EPIDURAL; INFILTRATION; INTRACAUDAL; PERINEURAL AS NEEDED
Status: DISCONTINUED | OUTPATIENT
Start: 2025-04-16 | End: 2025-04-16

## 2025-04-16 RX ORDER — PROPOFOL 10 MG/ML
INJECTION, EMULSION INTRAVENOUS AS NEEDED
Status: DISCONTINUED | OUTPATIENT
Start: 2025-04-16 | End: 2025-04-16

## 2025-04-16 RX ORDER — SODIUM CHLORIDE, SODIUM LACTATE, POTASSIUM CHLORIDE, CALCIUM CHLORIDE 600; 310; 30; 20 MG/100ML; MG/100ML; MG/100ML; MG/100ML
INJECTION, SOLUTION INTRAVENOUS CONTINUOUS PRN
Status: DISCONTINUED | OUTPATIENT
Start: 2025-04-16 | End: 2025-04-16

## 2025-04-16 RX ADMIN — SODIUM CHLORIDE, SODIUM LACTATE, POTASSIUM CHLORIDE, AND CALCIUM CHLORIDE: .6; .31; .03; .02 INJECTION, SOLUTION INTRAVENOUS at 07:22

## 2025-04-16 RX ADMIN — LIDOCAINE HYDROCHLORIDE 100 MG: 10 INJECTION, SOLUTION EPIDURAL; INFILTRATION; INTRACAUDAL; PERINEURAL at 07:36

## 2025-04-16 RX ADMIN — PROPOFOL 100 MG: 10 INJECTION, EMULSION INTRAVENOUS at 07:36

## 2025-04-16 RX ADMIN — SODIUM CHLORIDE, SODIUM LACTATE, POTASSIUM CHLORIDE, AND CALCIUM CHLORIDE 75 ML/HR: .6; .31; .03; .02 INJECTION, SOLUTION INTRAVENOUS at 07:17

## 2025-04-16 RX ADMIN — PROPOFOL 50 MG: 10 INJECTION, EMULSION INTRAVENOUS at 07:39

## 2025-04-16 NOTE — ANESTHESIA PREPROCEDURE EVALUATION
Procedure:  EGD    Relevant Problems   ANESTHESIA (within normal limits)   (-) History of anesthesia complications      CARDIO   (+) Atrial fibrillation (HCC)   (+) Heart murmur   (+) Hyperlipidemia   (+) Mitral valve prolapse      ENDO   (+) Hypothyroidism      GI/HEPATIC   (+) Gastroesophageal reflux disease with esophagitis without hemorrhage      MUSCULOSKELETAL   (+) Arthritis   (+) Osteoarthritis of both knees        Physical Exam    Airway    Mallampati score: I  TM Distance: >3 FB  Neck ROM: full     Dental   No notable dental hx     Cardiovascular      Pulmonary      Other Findings  post-pubertal.      Anesthesia Plan  ASA Score- 3     Anesthesia Type- IV sedation with anesthesia with ASA Monitors.         Additional Monitors:     Airway Plan:            Plan Factors-    Chart reviewed. EKG reviewed.  Existing labs reviewed. Patient summary reviewed.    Patient is not a current smoker.  Patient did not smoke on day of surgery.            Induction- intravenous.    Postoperative Plan-     Perioperative Resuscitation Plan - Level 1 - Full Code.       Informed Consent- Anesthetic plan and risks discussed with patient.  I personally reviewed this patient with the CRNA. Discussed and agreed on the Anesthesia Plan with the CRNA..      NPO Status:  Vitals Value Taken Time   Date of last liquid 04/16/25 04/16/25 0705   Time of last liquid 0500 04/16/25 0705   Date of last solid 04/15/25 04/16/25 0705   Time of last solid 1800 04/16/25 0705       NPO verified.  NKDA.    Plan:  IV sedation, GA backup    Benefits and risks of sedation were discussed with the patient including possibility of recall under sedation and the potential for conversion to general anesthesia if necessary.  All questions were answered.  Anesthesia consent was obtained from the patient.

## 2025-04-16 NOTE — PERIOPERATIVE NURSING NOTE
Patient brought from procedure to phase two. Sbar given at bedside to Lilli PRIETO rn  . Patient resting in bed, bed locked in lowest position with side rails raise, call light in reach and environment cleared. Plan of care ongoing.

## 2025-04-16 NOTE — H&P
History and Physical -  [facility]  Suzanne Coelho 74 y.o. female MRN: [id number]    HPI: Suzanne Coelho is a 74 y.o. year old female who presents for evaluation of esophagitis.      Review of Systems    Historical Information   Past Medical History:   Diagnosis Date    A-fib (HCC) 2023    Endometriosis     last assessed 07/17/2017    GERD (gastroesophageal reflux disease) 2022    Hyperlipidemia 2022    Hypothyroidism     MVP (mitral valve prolapse) 1980     Past Surgical History:   Procedure Laterality Date    BREAST BIOPSY      Calcifications( negative)    COLONOSCOPY  2017    HYSTERECTOMY  01/17/1993     Social History   Social History     Substance and Sexual Activity   Alcohol Use Yes    Alcohol/week: 5.0 standard drinks of alcohol    Types: 5 Glasses of wine per week    Comment: 4x weekly glass of wine     Social History     Substance and Sexual Activity   Drug Use No     Social History     Tobacco Use   Smoking Status Former    Current packs/day: 0.25    Average packs/day: 0.3 packs/day for 10.0 years (2.5 ttl pk-yrs)    Types: Cigarettes   Smokeless Tobacco Never     Family History   Problem Relation Age of Onset    Stroke Mother         CVA    Heart disease Mother     Hearing loss Mother     Vision loss Mother         Macular degeneration    Heart disease Father     Stroke Father     Breast cancer Sister     Heart disease Brother     COPD Brother     Thyroid disease Sister        Meds/Allergies     Not in a hospital admission.    No Known Allergies    Objective     There were no vitals taken for this visit.      PHYSICAL EXAM    Gen: NAD  CV: RRR  CHEST: Clear  ABD: soft, NT/ND  EXT: no edema  Neuro: AAO      ASSESSMENT/PLAN:  This is a 74 y.o. year old female here for evaluation of erosive esophagitis.    PLAN:   Procedure: EGD.

## 2025-04-16 NOTE — ANESTHESIA POSTPROCEDURE EVALUATION
Post-Op Assessment Note    CV Status:  Stable  Pain Score: 0    Pain management: adequate       Mental Status:  Sleepy   Hydration Status:  Stable   PONV Controlled:  None   Airway Patency:  Patent     Post Op Vitals Reviewed: Yes    No anethesia notable event occurred.    Staff: CRNA           Last Filed PACU Vitals:  Vitals Value Taken Time   Temp     Pulse 58    /56    Resp 16    SpO2 97

## 2025-04-18 ENCOUNTER — TRANSCRIBE ORDERS (OUTPATIENT)
Dept: ADMINISTRATIVE | Age: 75
End: 2025-04-18

## 2025-04-18 ENCOUNTER — APPOINTMENT (OUTPATIENT)
Dept: LAB | Facility: CLINIC | Age: 75
End: 2025-04-18
Payer: COMMERCIAL

## 2025-04-18 DIAGNOSIS — K31.83 ACHLORHYDRIA: ICD-10-CM

## 2025-04-18 DIAGNOSIS — Z79.899 POLYPHARMACY: ICD-10-CM

## 2025-04-18 DIAGNOSIS — Z79.899 POLYPHARMACY: Primary | ICD-10-CM

## 2025-04-18 DIAGNOSIS — I10 HYPERTENSION, ESSENTIAL: ICD-10-CM

## 2025-04-18 DIAGNOSIS — K21.00 GASTROESOPHAGEAL REFLUX DISEASE WITH ESOPHAGITIS WITHOUT HEMORRHAGE: ICD-10-CM

## 2025-04-18 LAB
ALBUMIN SERPL BCG-MCNC: 4.2 G/DL (ref 3.5–5)
ALP SERPL-CCNC: 84 U/L (ref 34–104)
ALT SERPL W P-5'-P-CCNC: 25 U/L (ref 7–52)
AST SERPL W P-5'-P-CCNC: 25 U/L (ref 13–39)
BILIRUB DIRECT SERPL-MCNC: 0.16 MG/DL (ref 0–0.2)
BILIRUB SERPL-MCNC: 0.77 MG/DL (ref 0.2–1)
CHOLEST SERPL-MCNC: 169 MG/DL (ref ?–200)
HDLC SERPL-MCNC: 73 MG/DL
LDLC SERPL CALC-MCNC: 77 MG/DL (ref 0–100)
MAGNESIUM SERPL-MCNC: 2 MG/DL (ref 1.9–2.7)
NONHDLC SERPL-MCNC: 96 MG/DL
PROT SERPL-MCNC: 6.8 G/DL (ref 6.4–8.4)
TRIGL SERPL-MCNC: 93 MG/DL (ref ?–150)
VIT B12 SERPL-MCNC: 534 PG/ML (ref 180–914)

## 2025-04-18 PROCEDURE — 83735 ASSAY OF MAGNESIUM: CPT | Performed by: INTERNAL MEDICINE

## 2025-04-18 PROCEDURE — 36415 COLL VENOUS BLD VENIPUNCTURE: CPT

## 2025-04-18 PROCEDURE — 80061 LIPID PANEL: CPT

## 2025-04-18 PROCEDURE — 80076 HEPATIC FUNCTION PANEL: CPT

## 2025-04-18 PROCEDURE — 88312 SPECIAL STAINS GROUP 1: CPT | Performed by: PATHOLOGY

## 2025-04-18 PROCEDURE — 82607 VITAMIN B-12: CPT

## 2025-04-18 PROCEDURE — 88305 TISSUE EXAM BY PATHOLOGIST: CPT | Performed by: PATHOLOGY

## 2025-04-25 ENCOUNTER — RESULTS FOLLOW-UP (OUTPATIENT)
Dept: GASTROENTEROLOGY | Facility: AMBULARY SURGERY CENTER | Age: 75
End: 2025-04-25

## 2025-04-25 NOTE — TELEPHONE ENCOUNTER
----- Message from Adrian Barahona MD sent at 4/25/2025  1:52 PM EDT -----  Repeat EGD in 3 years to assess for Isabel's esophagus.

## 2025-04-25 NOTE — TELEPHONE ENCOUNTER
Attempted to call pt regarding lab & EGD results and provider recommendations, however, I received voicemail. Advised to return call to office to discuss. 3 year EGD recall set.    Please, relay results if pt returns call. Thanks!

## 2025-04-29 ENCOUNTER — OFFICE VISIT (OUTPATIENT)
Age: 75
End: 2025-04-29
Payer: COMMERCIAL

## 2025-04-29 VITALS — WEIGHT: 153 LBS | HEIGHT: 66 IN | BODY MASS INDEX: 24.59 KG/M2

## 2025-04-29 DIAGNOSIS — L82.0 SEBORRHEIC KERATOSIS, INFLAMED: Primary | ICD-10-CM

## 2025-04-29 PROCEDURE — 17110 DESTRUCTION B9 LES UP TO 14: CPT | Performed by: DERMATOLOGY

## 2025-04-29 NOTE — PROGRESS NOTES
"[facility] Note     Patient Name: Suzanne Coelho  Encounter Date: 4/29/2025       Have you been cared for by a [facility] in the last 3 years and, if so, which description applies to you? Yes. I have been here within the last 3 years, and my medical history has NOT changed since that time. I am not of child-bearing potential.     REVIEW OF SYSTEMS:  Have you recently had or currently have any of the following? No changes in my recent health.   PAST MEDICAL HISTORY:  Have you personally ever had or currently have any of the following?  If \"YES,\" then please provide more detail. No changes in my medical history.   HISTORY OF IMMUNOSUPPRESSION: Do you have a history of any of the following:  Systemic Immunosuppression such as Diabetes, Biologic or Immunotherapy, Chemotherapy, Organ Transplantation, Bone Marrow Transplantation or Prednisone?  No     Answering \"YES\" requires the addition of the dotphrase \"IMMUNOSUPPRESSED\" as the first diagnosis of the patient's visit.   FAMILY HISTORY:  Any \"first degree relatives\" (parent, brother, sister, or child) with the following?    No changes in my family's known health.   PATIENT EXPERIENCE:    Do you want the Dermatologist to perform a COMPLETE skin exam today including a clinical examination under the \"bra and underwear\" areas?  Yes  If necessary, do we have your permission to call and leave a detailed message on your Preferred Phone number that includes your specific medical information?  Yes      No Known Allergies   Current Outpatient Medications:   •  Acetaminophen (TYLENOL 8 HOUR PO), Tylenol, Disp: , Rfl:   •  amLODIPine (NORVASC) 5 mg tablet, Take 5 mg by mouth daily, Disp: , Rfl:   •  calcium carbonate 1250 MG capsule, Take 1,250 mg by mouth 2 (two) times a day with meals, Disp: , Rfl:   •  Diclofenac Sodium (Voltaren) 1 %, as directed Externally, Disp: , Rfl:   •  levothyroxine 50 mcg tablet, TAKE 1 TABLET DAILY IN THE EARLY MORNING, " "Disp: 90 tablet, Rfl: 1  •  losartan (COZAAR) 100 MG tablet, Take 100 mg by mouth daily, Disp: , Rfl:   •  omega-3-acid ethyl esters (LOVAZA) 1 g capsule, Take 2 g by mouth, Disp: , Rfl:   •  pantoprazole (PROTONIX) 40 mg tablet, TAKE 1 TABLET BY MOUTH TWICE A DAY, Disp: 180 tablet, Rfl: 1  •  rivaroxaban (Xarelto) 20 mg tablet, Take 20 mg by mouth daily with breakfast, Disp: , Rfl:   •  rosuvastatin (CRESTOR) 5 mg tablet, TAKE 1 TABLET DAILY, Disp: 90 tablet, Rfl: 1              Whom besides the patient is providing clinical information about today's encounter?   NO ADDITIONAL HISTORIAN (patient alone provided history)    Physical Exam and Assessment/Plan by Diagnosis:        SEBORRHEIC KERATOSIS- inflamed     Physical Exam:  Anatomic Location: right mid paraspinal   Morphologic Description:  Waxy \"stuck-on\" discrete papule  Any active signs of \"inflamed\" status:  Active skin ulceration / inflammation  Pertinent Positives:  Pertinent Negatives:    Additional History of Present Condition:    Patient reports that area on her back is really itchy and irritated, she reporst that the bump broke open on it own a few weeks ago     Plan:  Reviewed that these lesions are NOT cancers and cannot harm a person directly.  Under Medicare guidelines, the removal of a seborrheic keratosis is NOT covered unless the specific lesion is of medical necessity (interferes with vision, hearing, breathing), or is symptomatic (bleeding, itching, infected, inflamed). Medicare does NOT cover removal simply if the lesions are unsightly. Medicare does cover the evaluation of any lesion to determine if a lesion is or is not cancerous (i.e. skin biopsy).  Discussed freezing the irritated area with Liquid Nitrogen, patient agrees to procedure; consent was signed.      PROCEDURE:  DESTRUCTION OF BENIGN LESIONS WITH CRYOTHERAPY  After a thorough discussion of treatment options and risk/benefits/alternatives (including but not limited to local pain, " scarring, dyspigmentation, blistering, and possible superinfection), verbal and written consent were obtained and the aforementioned lesions were treated on with cryotherapy using liquid nitrogen x 3 cycle for 5-10 seconds.    TOTAL NUMBER of 1 lesions were treated today on the ANATOMIC LOCATION: right mid paraspinal .    The patient tolerated the procedure well, and after-care instructions were provided.         Medical Complexity:    SELF-LIMITED OR MINOR PROBLEM.  Problem runs a definite and prescribed course, is transient in nature, and is not likely to permanently alter health status.          Scribe Attestation    I,:  Gloria Swift MA am acting as a scribe while in the presence of the attending physician.:       I,:  Tiffanie Forbes MD personally performed the services described in this documentation    as scribed in my presence.:

## 2025-06-13 DIAGNOSIS — E03.9 HYPOTHYROIDISM, UNSPECIFIED TYPE: ICD-10-CM

## 2025-06-13 DIAGNOSIS — E78.5 HYPERLIPIDEMIA, UNSPECIFIED HYPERLIPIDEMIA TYPE: ICD-10-CM

## 2025-06-13 RX ORDER — ROSUVASTATIN CALCIUM 5 MG/1
5 TABLET, COATED ORAL DAILY
Qty: 90 TABLET | Refills: 1 | Status: SHIPPED | OUTPATIENT
Start: 2025-06-13

## 2025-06-13 RX ORDER — LEVOTHYROXINE SODIUM 50 UG/1
TABLET ORAL
Qty: 90 TABLET | Refills: 1 | Status: SHIPPED | OUTPATIENT
Start: 2025-06-13

## 2025-06-30 ENCOUNTER — EVALUATION (OUTPATIENT)
Dept: PHYSICAL THERAPY | Facility: CLINIC | Age: 75
End: 2025-06-30
Attending: ORTHOPAEDIC SURGERY
Payer: COMMERCIAL

## 2025-06-30 DIAGNOSIS — M25.562 LEFT KNEE PAIN, UNSPECIFIED CHRONICITY: ICD-10-CM

## 2025-06-30 DIAGNOSIS — M54.16 LUMBAR RADICULOPATHY: Primary | ICD-10-CM

## 2025-06-30 PROCEDURE — 97161 PT EVAL LOW COMPLEX 20 MIN: CPT

## 2025-06-30 NOTE — PROGRESS NOTES
PT Evaluation     Today's date: 2025  Patient name: Suzanne Coelho  : 1950  MRN: [id number]  Referring provider: Ken Ayala MD  Dx:   Encounter Diagnosis     ICD-10-CM    1. Lumbar radiculopathy  M54.16       2. Left knee pain, unspecified chronicity  M25.562           Start Time: 1445  Stop Time: 1530  Total time in clinic (min): 45 minutes    Pt. arriving to clinic status with complaints of lumbar and left knee pain. Impaired range of motion, strength, functional mobility and motor performance due to localized inflammation and pain in left knee from DJD and resolving lower back pain from acute spasms x 1 week.     Due to current deficits, patient is limited functionally. Patient has been educated in posture correction, proper seated ergonomics, joint protection strategies to help mitigate pain and reduce inflammation . Patient would benefit from skilled physical therapy services to address their aforementioned functional limitations and progress towards prior level of function and independence with home exercise program which patient is expected to achieves within 12 visits     Short Term Goals:  4 weeks  - Initiate and advance home exercise program to self manage symptoms.-   - Improve postural awareness and demonstrate self postural correction.  - Improve AROM lumbar spine to minimal limitation or better to improve mobility  - Patient to reduce pain at worst to 3-4  / 10 at worst in knee in household chores to improve activity tolerance.  - knee fleixon to 130     Long Term Goals:  8 weeks  - Patient to exhibit full independence with home exercise program for symptoms relief and prevention   - Patient to achieve knee ROM to WFL without increased pain - 0- 140  - Improve LE strength to 5/5 grossly to improve general mobility  - Improve sitting and standing tolerance to greater than 1 hr to cook dinner  - Improve lifting mechanics to lift items off floor with proper mechanics and neutral spine  -  Improve FOTO score to predicted score or better.  - Improve transfers to minimal pain <2/10 with , sit to stand, transfers in and out of car      2025 FOTO:       predicted:    Impairments:    restricted ROM    decreased strength   pain with function   activity intolerance   weight bearing intolerance   imbalance   abnormal movement    Prognosis:  Excellent  Positive and negative prognostic indicator(s):  positive attitude about recovery and pain >3 months    Planned interventions:  home exercise program, patient education, manual therapy, graded activity, flexibility, strengthening, and balance and weight bearing training joint protection education, neutral spine education, pain management education    Duration in visits:  12  Frequency: 2 and 2-3 visits per week  Duration in weeks:  6  Plan of Care beginning date: 25  Plan of Care expiration date: 6 weeks - 2025  Treatment plan discussed with: Patient      History of Current Injury:  Chronic history of left knee pain with steroids and gel shots which all help a little bit.  Noting swelling in knee which is persistent.  Reports decreasing her activity level will help to reduce pain and swelling.  Doing bi-level stairs at home hurt the knee.  Reports active lifestyles with walking < 1 mile which hurts the knee and gardening outside.   Reports bending over to garden instead of squatting.  Denies any PT for the hip in the past but not the knee.  Last week had a lumbar spine from bending over and unable to move- Went to chiropractor  and walked out painfree.     Pain  Current pain ratin 20% of time it throbs  At best pain ratin  At worst pain ratin/10  Location: knee   Quality: throbbing, stiff  Relieving factors: rest , ice packs  Aggravating factors: standing from sitting and unsteadiness, standing too long to cook       Imaging: MRI- 1 year ago. Bone spurs, Osteo changes, bone edema, swelling and mensicus tear, x ray were mild thinning    Hobbies/Interests: walking, active lifestyle walking and gardening  Occupation:  none       POSTURE:     Standing:   Sitting:        Lumbar AROM limitations:    (* =  Pain) Evaluation Re-evaluation Re-evaluation Re-evaluation Goal   Date:              6/30/2025                                 Lumbar flexion:            nil  loss  painless       Min to Normal and painfree   Lumbar extension:       normal loss  painless        Painfree    R sidebending lateral flex nil  and normal loss  painless       To lateral knee   L sidebending lateral flex normal loss  painless        To lateral knee   l knee flexion minimal loss  tightness  125       L knee flexion  slight loss  painless  140dg         (S) Severe ,(M) =Moderate, (Min) =Minimal (N) Normal tightness   Comments:         MMT Lower Extremity Evaluation Re-evaluation Re-evaluation Re-evaluation Goal   0 out of 5 0_/5 Right Left Right Left Right Left Right Left Right Left   Date 6/30/2025       Hip Flexion 5 /5  4/5        5/5   5/5   HIp Abduction 5 /5 4 /5       5/5 5/5   Hip Adduction 5 /5 5 /5        5/5  5/5   HIp Extension 4+ /5 4+ /5       5/5 5/5   Knee Flexion    5/5    5/5       5/5 5/5   Knee Extension    5/5    5/5       5/5 5/5   Ankle Dorsiflexion    5/5    5 /5       5/5 5/5   Ankle Plantarflexion    5 /5    5 /5       5/5 5/5   VMO conraction Good/fair Fair/poor       5/5          Flexibility Evaluation Re-evaluation Re-evaluation Re-evaluation Goal   Date 6/30/2025        Right Left Right  Left Right  Left Right  Left Right  Left   Hamstrings    n    n         Min to N Min to N   HIp flexors    n    n       Min to N Min to N   Quadriceps    n    mod       Min to N Min to N   Gastroc    n    n       Min to N Min to N   Gluteals    n    n       Min to N Min to N   Piriformis    n    n       Min to N Min to N    (S) Severe ,(M) =Moderate, (Min) =Minimal (N) Normal tightness       Balance Test Evaluation Evaluation Re-eval Re-Eval Re-eval Re-eval   Date  "6/30/2025 6/30/2025       Side  Involved side Uninvolved Involved  Involved  Involved  Involved    Single limb stance Increased hip sway  Neutral alignment        >10\" neutral                                             Myotomes LE Right  Left   L1/L2 Hip flexion         5/5         5/5   L3 knee extension         5/5         5/5   L4 Ankle dorsiflexion         5/5         5/5   L5 Big Toe extension         5/5         5/5   S1 Ankle plantar flexion         5/5         5/5   s2 Knee FLexion         5/5         5/5     DTR    SPECIAL TESTS:  Slump (sciatic tension): -  SLR (disc pathology): -  Prone Instability: -    Edema  mid knee 3 cm larger mid patella and at vmo  Atrophy of left VMO and quad        Function:       Squatting mechanics:   6/30/2025 Needs moderate verbal and or manual cueing to perform a proper squat.  Noted moderate trunk flexion    Re-eval =  Re-eval=    Gait:   6/30/2025 grossly normal. But noting compensation with initial walking from sitting  Re-eval =    Precautions:  lumbar pain and left knee mensical tears (complex)  Past Medical History[1]     Date        6/30/2025              Visits # EVAL  2 3  4  5   6 (foto)   Manual                                                                               Neuro                iso ab ball squeeze x20               iso ab ab hip abd  -->               iso ab knee ext  -->              quad sets X10 5\" hold                              TE               Bike  --->                  SLR  flex and abd X10 ea   incr to 20            bridging  x10  Incr to 20                                                            TA                                                                               Gait                                                               Modalities                                              HEP educated in posture correction, proper seated ergonomics, joint protection strategies  x 15 mins              see medbridge and exercise " above                   [1]   Past Medical History:  Diagnosis Date    A-fib (HCC) 2023    Endometriosis     last assessed 07/17/2017    GERD (gastroesophageal reflux disease) 2022    Hyperlipidemia 2022    Hypothyroidism     MVP (mitral valve prolapse) 1980

## 2025-06-30 NOTE — LETTER
2025    Ken Ayala MD  [address]    Patient: Szuanne Coelho   YOB: 1950   Date of Visit: 2025     Encounter Diagnosis     ICD-10-CM    1. Lumbar radiculopathy  M54.16       2. Left knee pain, unspecified chronicity  M25.562           Dear MD Berkley Werner MD:    Thank you for your recent referral of Suzanne Coelho. Please review the attached evaluation summary from Suzanne's recent visit.     Please verify that you agree with the plan of care by signing the attached order.     If you have any questions or concerns, please do not hesitate to call.     I sincerely appreciate the opportunity to share in the care of one of your patients and hope to have another opportunity to work with you in the near future.       Sincerely,    Shin Fenton, PT      Referring Provider:      I certify that I have read the below Plan of Care and certify the need for these services furnished under this plan of treatment while under my care.                    Ken Ayala MD  [address]  Via Fax: [phone]          PT Evaluation     Today's date: 2025  Patient name: Suzanne Coelho  : 1950  MRN: [id number]  Referring provider: Ken Ayala MD  Dx:   Encounter Diagnosis     ICD-10-CM    1. Lumbar radiculopathy  M54.16       2. Left knee pain, unspecified chronicity  M25.562           Start Time: 1445  Stop Time: 1530  Total time in clinic (min): 45 minutes    Pt. arriving to clinic status with complaints of lumbar and left knee pain. Impaired range of motion, strength, functional mobility and motor performance due to localized inflammation and pain in left knee from DJD and resolving lower back pain from acute spasms x 1 week.     Due to current deficits, patient is limited functionally. Patient has been educated in posture correction, proper seated ergonomics, joint protection strategies to help  mitigate pain and reduce inflammation . Patient would benefit from skilled physical therapy services to address their aforementioned functional limitations and progress towards prior level of function and independence with home exercise program which patient is expected to achieves within 12 visits     Short Term Goals:  4 weeks  - Initiate and advance home exercise program to self manage symptoms.-   - Improve postural awareness and demonstrate self postural correction.  - Improve AROM lumbar spine to minimal limitation or better to improve mobility  - Patient to reduce pain at worst to 3-4  / 10 at worst in knee in household chores to improve activity tolerance.  - knee fleixon to 130     Long Term Goals:  8 weeks  - Patient to exhibit full independence with home exercise program for symptoms relief and prevention   - Patient to achieve knee ROM to WFL without increased pain - 0- 140  - Improve LE strength to 5/5 grossly to improve general mobility  - Improve sitting and standing tolerance to greater than 1 hr to cook dinner  - Improve lifting mechanics to lift items off floor with proper mechanics and neutral spine  - Improve FOTO score to predicted score or better.  - Improve transfers to minimal pain <2/10 with , sit to stand, transfers in and out of car      6/30/2025 FOTO:       predicted:    Impairments:    restricted ROM    decreased strength   pain with function   activity intolerance   weight bearing intolerance   imbalance   abnormal movement    Prognosis:  Excellent  Positive and negative prognostic indicator(s):  positive attitude about recovery and pain >3 months    Planned interventions:  home exercise program, patient education, manual therapy, graded activity, flexibility, strengthening, and balance and weight bearing training joint protection education, neutral spine education, pain management education    Duration in visits:  12  Frequency: 2 and 2-3 visits per week  Duration in weeks:  6  Plan  of Care beginning date: 25  Plan of Care expiration date: 6 weeks - 2025  Treatment plan discussed with: Patient      History of Current Injury:  Chronic history of left knee pain with steroids and gel shots which all help a little bit.  Noting swelling in knee which is persistent.  Reports decreasing her activity level will help to reduce pain and swelling.  Doing bi-level stairs at home hurt the knee.  Reports active lifestyles with walking < 1 mile which hurts the knee and gardening outside.   Reports bending over to garden instead of squatting.  Denies any PT for the hip in the past but not the knee.  Last week had a lumbar spine from bending over and unable to move- Went to chiropractor  and walked out painfree.     Pain  Current pain ratin 20% of time it throbs  At best pain ratin  At worst pain ratin/10  Location: knee   Quality: throbbing, stiff  Relieving factors: rest , ice packs  Aggravating factors: standing from sitting and unsteadiness, standing too long to cook       Imaging: MRI- 1 year ago. Bone spurs, Osteo changes, bone edema, swelling and mensicus tear, x ray were mild thinning   Hobbies/Interests: walking, active lifestyle walking and gardening  Occupation:  none       POSTURE:     Standing:   Sitting:        Lumbar AROM limitations:    (* =  Pain) Evaluation Re-evaluation Re-evaluation Re-evaluation Goal   Date:              2025                                 Lumbar flexion:            nil  loss  painless       Min to Normal and painfree   Lumbar extension:       normal loss  painless        Painfree    R sidebending lateral flex nil  and normal loss  painless       To lateral knee   L sidebending lateral flex normal loss  painless        To lateral knee   l knee flexion minimal loss  tightness  125       L knee flexion  slight loss  painless  140dg         (S) Severe ,(M) =Moderate, (Min) =Minimal (N) Normal tightness   Comments:         MMT Lower Extremity  "Evaluation Re-evaluation Re-evaluation Re-evaluation Goal   0 out of 5 0_/5 Right Left Right Left Right Left Right Left Right Left   Date 6/30/2025       Hip Flexion 5 /5  4/5        5/5   5/5   HIp Abduction 5 /5 4 /5       5/5 5/5   Hip Adduction 5 /5 5 /5 5/5  5/5   HIp Extension 4+ /5 4+ /5       5/5 5/5   Knee Flexion    5/5 5/5       5/5 5/5   Knee Extension    5/5 5/5 5/5 5/5   Ankle Dorsiflexion    5/5 5 /5 5/5 5/5   Ankle Plantarflexion    5 /5 5 /5 5/5 5/5   VMO conraction Good/fair Fair/poor       5/5          Flexibility Evaluation Re-evaluation Re-evaluation Re-evaluation Goal   Date 6/30/2025        Right Left Right  Left Right  Left Right  Left Right  Left   Hamstrings    n    n         Min to N Min to N   HIp flexors    n    n       Min to N Min to N   Quadriceps    n    mod       Min to N Min to N   Gastroc    n    n       Min to N Min to N   Gluteals    n    n       Min to N Min to N   Piriformis    n    n       Min to N Min to N    (S) Severe ,(M) =Moderate, (Min) =Minimal (N) Normal tightness       Balance Test Evaluation Evaluation Re-eval Re-Eval Re-eval Re-eval   Date 6/30/2025 6/30/2025       Side  Involved side Uninvolved Involved  Involved  Involved  Involved    Single limb stance Increased hip sway  Neutral alignment        >10\" neutral                                             Myotomes LE Right  Left   L1/L2 Hip flexion         5/5         5/5   L3 knee extension         5/5         5/5   L4 Ankle dorsiflexion         5/5         5/5   L5 Big Toe extension         5/5         5/5   S1 Ankle plantar flexion         5/5         5/5   s2 Knee FLexion         5/5         5/5     DTR    SPECIAL TESTS:  Slump (sciatic tension): -  SLR (disc pathology): -  Prone Instability: -    Edema  mid knee 3 cm larger mid patella and at vmo  Atrophy of left VMO and quad        Function:       Squatting mechanics:   6/30/2025 Needs moderate verbal and or manual cueing " "to perform a proper squat.  Noted moderate trunk flexion    Re-eval =  Re-eval=    Gait:   6/30/2025 grossly normal. But noting compensation with initial walking from sitting  Re-eval =    Precautions:  lumbar pain and left knee mensical tears (complex)  Past Medical History[1]     Date        6/30/2025              Visits # EVAL  2 3  4  5   6 (foto)   Manual                                                                               Neuro                iso ab ball squeeze x20               iso ab ab hip abd  -->               iso ab knee ext  -->              quad sets X10 5\" hold                              TE               Bike  --->                  SLR  flex and abd X10 ea   incr to 20            bridging  x10  Incr to 20                                                            TA                                                                               Gait                                                               Modalities                                              HEP educated in posture correction, proper seated ergonomics, joint protection strategies  x 15 mins              see medbridge and exercise above                                     [1]  Past Medical History:  Diagnosis Date   • A-fib (HCC) 2023   • Endometriosis     last assessed 07/17/2017   • GERD (gastroesophageal reflux disease) 2022   • Hyperlipidemia 2022   • Hypothyroidism    • MVP (mitral valve prolapse) 1980   "

## 2025-07-02 ENCOUNTER — OFFICE VISIT (OUTPATIENT)
Dept: PHYSICAL THERAPY | Facility: CLINIC | Age: 75
End: 2025-07-02
Attending: ORTHOPAEDIC SURGERY
Payer: COMMERCIAL

## 2025-07-02 DIAGNOSIS — M54.16 LUMBAR RADICULOPATHY: ICD-10-CM

## 2025-07-02 DIAGNOSIS — M25.562 LEFT KNEE PAIN, UNSPECIFIED CHRONICITY: Primary | ICD-10-CM

## 2025-07-02 PROCEDURE — 97112 NEUROMUSCULAR REEDUCATION: CPT

## 2025-07-02 PROCEDURE — 97110 THERAPEUTIC EXERCISES: CPT

## 2025-07-02 NOTE — PROGRESS NOTES
Daily Note     Today's date: 2025  Patient name: Suzanne Coelho  : 1950  MRN: [id number]  Referring provider: Ken Ayala MD  Dx:   Encounter Diagnosis     ICD-10-CM    1. Left knee pain, unspecified chronicity  M25.562       2. Lumbar radiculopathy  M54.16                      Subjective: usual knee and swelling       Objective: See treatment diary below      Assessment: Tolerated treatment well. Patient demonstrated fatigue post treatment and would benefit from continued PT      Plan: Continue per plan of care.      Start Time: 1445  Stop Time: 1530  Total time in clinic (min): 45 minutes     Pt. arriving to clinic status with complaints of lumbar and left knee pain. Impaired range of motion, strength, functional mobility and motor performance due to localized inflammation and pain in left knee from DJD and resolving lower back pain from acute spasms x 1 week.     Due to current deficits, patient is limited functionally. Patient has been educated in posture correction, proper seated ergonomics, joint protection strategies to help mitigate pain and reduce inflammation . Patient would benefit from skilled physical therapy services to address their aforementioned functional limitations and progress towards prior level of function and independence with home exercise program which patient is expected to achieves within 12 visits      Short Term Goals:  4 weeks  - Initiate and advance home exercise program to self manage symptoms.-   - Improve postural awareness and demonstrate self postural correction.  - Improve AROM lumbar spine to minimal limitation or better to improve mobility  - Patient to reduce pain at worst to 3-4  / 10 at worst in knee in household chores to improve activity tolerance.  - knee fleixon to 130      Long Term Goals:  8 weeks  - Patient to exhibit full independence with home exercise program for symptoms relief and prevention   - Patient to achieve knee ROM to WFL without  increased pain - 0- 140  - Improve LE strength to 5/5 grossly to improve general mobility  - Improve sitting and standing tolerance to greater than 1 hr to cook dinner  - Improve lifting mechanics to lift items off floor with proper mechanics and neutral spine  - Improve FOTO score to predicted score or better.  - Improve transfers to minimal pain <2/10 with , sit to stand, transfers in and out of car        2025 FOTO:       predicted:     Impairments:    restricted ROM    decreased strength   pain with function   activity intolerance   weight bearing intolerance   imbalance   abnormal movement     Prognosis:  Excellent  Positive and negative prognostic indicator(s):  positive attitude about recovery and pain >3 months     Planned interventions:  home exercise program, patient education, manual therapy, graded activity, flexibility, strengthening, and balance and weight bearing training joint protection education, neutral spine education, pain management education     Duration in visits:  12  Frequency: 2 and 2-3 visits per week  Duration in weeks:  6  Plan of Care beginning date: 25  Plan of Care expiration date: 6 weeks - 2025  Treatment plan discussed with: Patient        History of Current Injury:  Chronic history of left knee pain with steroids and gel shots which all help a little bit.  Noting swelling in knee which is persistent.  Reports decreasing her activity level will help to reduce pain and swelling.  Doing bi-level stairs at home hurt the knee.  Reports active lifestyles with walking < 1 mile which hurts the knee and gardening outside.   Reports bending over to garden instead of squatting.  Denies any PT for the hip in the past but not the knee.  Last week had a lumbar spine from bending over and unable to move- Went to chiropractor  and walked out painfree.      Pain  Current pain ratin 20% of time it throbs  At best pain ratin  At worst pain ratin/10  Location: knee    Quality: throbbing, stiff  Relieving factors: rest , ice packs  Aggravating factors: standing from sitting and unsteadiness, standing too long to cook         Imaging: MRI- 1 year ago. Bone spurs, Osteo changes, bone edema, swelling and mensicus tear, x ray were mild thinning   Hobbies/Interests: walking, active lifestyle walking and gardening  Occupation:  none         POSTURE:      Standing:   Sitting:         Lumbar AROM limitations:    (* =  Pain) Evaluation Re-evaluation Re-evaluation Re-evaluation Goal   Date:              6/30/2025                                 Lumbar flexion:            nil  loss  painless          Min to Normal and painfree   Lumbar extension:       normal loss  painless            Painfree    R sidebending lateral flex nil  and normal loss  painless           To lateral knee   L sidebending lateral flex normal loss  painless            To lateral knee   l knee flexion minimal loss  tightness  125           L knee flexion  slight loss  painless  140dg              (S) Severe ,(M) =Moderate, (Min) =Minimal (N) Normal tightness   Comments:                       MMT Lower Extremity Evaluation Re-evaluation Re-evaluation Re-evaluation Goal   0 out of 5 0_/5 Right Left Right Left Right Left Right Left Right Left   Date 6/30/2025           Hip Flexion 5 /5  4/5              5/5   5/5   HIp Abduction 5 /5 4 /5             5/5 5/5   Hip Adduction 5 /5 5 /5              5/5  5/5   HIp Extension 4+ /5 4+ /5             5/5 5/5   Knee Flexion    5/5    5/5             5/5 5/5   Knee Extension    5/5    5/5             5/5 5/5   Ankle Dorsiflexion    5/5    5 /5             5/5 5/5   Ankle Plantarflexion    5 /5 5 /5             5/5 5/5   VMO conraction Good/fair Fair/poor             5/5                           Flexibility Evaluation Re-evaluation Re-evaluation Re-evaluation Goal   Date 6/30/2025             Right Left Right  Left Right  Left Right  Left Right  Left   Hamstrings    n    n      "          Min to N Min to N   HIp flexors    n    n             Min to N Min to N   Quadriceps    n    mod             Min to N Min to N   Gastroc    n    n             Min to N Min to N   Gluteals    n    n             Min to N Min to N   Piriformis    n    n             Min to N Min to N     (S) Severe ,(M) =Moderate, (Min) =Minimal (N) Normal tightness         Balance Test Evaluation Evaluation Re-eval Re-Eval Re-eval Re-eval   Date 6/30/2025 6/30/2025           Side  Involved side Uninvolved Involved  Involved  Involved  Involved    Single limb stance Increased hip sway  Neutral alignment           >10\" neutral                                                                            Myotomes LE Right  Left   L1/L2 Hip flexion         5/5         5/5   L3 knee extension         5/5         5/5   L4 Ankle dorsiflexion         5/5         5/5   L5 Big Toe extension         5/5         5/5   S1 Ankle plantar flexion         5/5         5/5   s2 Knee FLexion         5/5         5/5     DTR     SPECIAL TESTS:  Slump (sciatic tension): -  SLR (disc pathology): -  Prone Instability: -     Edema  mid knee 3 cm larger mid patella and at vmo  Atrophy of left VMO and quad        Function:         Squatting mechanics:   6/30/2025 Needs moderate verbal and or manual cueing to perform a proper squat.  Noted moderate trunk flexion    Re-eval =  Re-eval=     Gait:   6/30/2025 grossly normal. But noting compensation with initial walking from sitting  Re-eval =    Precautions:  lumbar pain and left knee mensical tears (complex)  [Past Medical History]    [Past Medical History]       Diagnosis Date    A-fib (HCC) 2023    Endometriosis       last assessed 07/17/2017    GERD (gastroesophageal reflux disease) 2022    Hyperlipidemia 2022    Hypothyroidism      MVP (mitral valve prolapse) 1980        Date        6/30/2025 7/2/25           Visits # EVAL  2 3  4  5   6 (foto)   Manual                                                    " "                           Neuro                iso ab ball squeeze x20   x20            iso ab ab hip abd  -->  Rtb x 20                        quad sets X10 5\" hold   x10                           TE               Bike  or nustep --->  6 min level 1 Nustep            Hamstring SOS   Quad EOT Sos   10\" x 3 bi  10\" x 3 bi             SLR   X10 ea   incr to 20            bridging  x10  Incr to 20  With rtb             standing hip Abd / ext    2 x 10    2 x 10            Leg Press     seat 6 45 #  x20                           TA                                                                               Gait                                                               Modalities                                               HEP educated in posture correction, proper seated ergonomics, joint protection strategies  x 15 mins              see medbridge and exercise above           "

## 2025-07-09 ENCOUNTER — OFFICE VISIT (OUTPATIENT)
Dept: PHYSICAL THERAPY | Facility: CLINIC | Age: 75
End: 2025-07-09
Attending: ORTHOPAEDIC SURGERY
Payer: COMMERCIAL

## 2025-07-09 DIAGNOSIS — M25.562 LEFT KNEE PAIN, UNSPECIFIED CHRONICITY: Primary | ICD-10-CM

## 2025-07-09 DIAGNOSIS — M54.16 LUMBAR RADICULOPATHY: ICD-10-CM

## 2025-07-09 PROCEDURE — 97112 NEUROMUSCULAR REEDUCATION: CPT

## 2025-07-09 PROCEDURE — 97110 THERAPEUTIC EXERCISES: CPT

## 2025-07-09 NOTE — PROGRESS NOTES
Daily Note     Today's date: 2025  Patient name: Suzanne Coelho  : 1950  MRN: [id number]  Referring provider: Ken Ayala MD  Dx:   Encounter Diagnosis     ICD-10-CM    1. Left knee pain, unspecified chronicity  M25.562       2. Lumbar radiculopathy  M54.16                      Subjective: My knee is feeling better, despite the swelling. I've been exercising in the pool.      Objective: See treatment diary below      Assessment: Tolerated treatment well. Patient demonstrated fatigue post treatment, exhibited good technique with therapeutic exercises, and would benefit from continued PT      Plan: Continue per plan of care.      Start Time: 1445  Stop Time: 1530  Total time in clinic (min): 45 minutes     Pt. arriving to clinic status with complaints of lumbar and left knee pain. Impaired range of motion, strength, functional mobility and motor performance due to localized inflammation and pain in left knee from DJD and resolving lower back pain from acute spasms x 1 week.     Due to current deficits, patient is limited functionally. Patient has been educated in posture correction, proper seated ergonomics, joint protection strategies to help mitigate pain and reduce inflammation . Patient would benefit from skilled physical therapy services to address their aforementioned functional limitations and progress towards prior level of function and independence with home exercise program which patient is expected to achieves within 12 visits      Short Term Goals:  4 weeks  - Initiate and advance home exercise program to self manage symptoms.-   - Improve postural awareness and demonstrate self postural correction.  - Improve AROM lumbar spine to minimal limitation or better to improve mobility  - Patient to reduce pain at worst to 3-4  / 10 at worst in knee in household chores to improve activity tolerance.  - knee fleixon to 130      Long Term Goals:  8 weeks  - Patient to exhibit full independence with  home exercise program for symptoms relief and prevention   - Patient to achieve knee ROM to WFL without increased pain - 0- 140  - Improve LE strength to 5/5 grossly to improve general mobility  - Improve sitting and standing tolerance to greater than 1 hr to cook dinner  - Improve lifting mechanics to lift items off floor with proper mechanics and neutral spine  - Improve FOTO score to predicted score or better.  - Improve transfers to minimal pain <2/10 with , sit to stand, transfers in and out of car        6/30/2025 FOTO:       predicted:     Impairments:    restricted ROM    decreased strength   pain with function   activity intolerance   weight bearing intolerance   imbalance   abnormal movement     Prognosis:  Excellent  Positive and negative prognostic indicator(s):  positive attitude about recovery and pain >3 months     Planned interventions:  home exercise program, patient education, manual therapy, graded activity, flexibility, strengthening, and balance and weight bearing training joint protection education, neutral spine education, pain management education     Duration in visits:  12  Frequency: 2 and 2-3 visits per week  Duration in weeks:  6  Plan of Care beginning date: 06/30/25  Plan of Care expiration date: 6 weeks - 8/11/2025  Treatment plan discussed with: Patient        History of Current Injury:  Chronic history of left knee pain with steroids and gel shots which all help a little bit.  Noting swelling in knee which is persistent.  Reports decreasing her activity level will help to reduce pain and swelling.  Doing bi-level stairs at home hurt the knee.  Reports active lifestyles with walking < 1 mile which hurts the knee and gardening outside.   Reports bending over to garden instead of squatting.  Denies any PT for the hip in the past but not the knee.  Last week had a lumbar spine from bending over and unable to move- Went to chiropractor  and walked out painfree.      Pain  Current pain  ratin 20% of time it throbs  At best pain ratin  At worst pain ratin/10  Location: knee   Quality: throbbing, stiff  Relieving factors: rest , ice packs  Aggravating factors: standing from sitting and unsteadiness, standing too long to cook         Imaging: MRI- 1 year ago. Bone spurs, Osteo changes, bone edema, swelling and mensicus tear, x ray were mild thinning   Hobbies/Interests: walking, active lifestyle walking and gardening  Occupation:  none         POSTURE:      Standing:   Sitting:         Lumbar AROM limitations:    (* =  Pain) Evaluation Re-evaluation Re-evaluation Re-evaluation Goal   Date:              2025                                 Lumbar flexion:            nil  loss  painless          Min to Normal and painfree   Lumbar extension:       normal loss  painless            Painfree    R sidebending lateral flex nil  and normal loss  painless           To lateral knee   L sidebending lateral flex normal loss  painless            To lateral knee   l knee flexion minimal loss  tightness  125           L knee flexion  slight loss  painless  140dg              (S) Severe ,(M) =Moderate, (Min) =Minimal (N) Normal tightness   Comments:                       MMT Lower Extremity Evaluation Re-evaluation Re-evaluation Re-evaluation Goal   0 out of 5 0_/5 Right Left Right Left Right Left Right Left Right Left   Date 2025           Hip Flexion 5 /5  4/5              5/5   5/5   HIp Abduction  / /5             5/5 5/5   Hip Adduction  / //  5/5   HIp Extension 4+ /5 4+ /5             /5 5/5   Knee Flexion    /    5/5             5/5 5/5   Knee Extension    /    5/5             5/5 5/5   Ankle Dorsiflexion    /    5 /             5/5 5/5   Ankle Plantarflexion     /    5 ///   VMO conraction Good/fair Fair/poor                                        Flexibility Evaluation Re-evaluation Re-evaluation Re-evaluation Goal   Date  "6/30/2025             Right Left Right  Left Right  Left Right  Left Right  Left   Hamstrings    n    n               Min to N Min to N   HIp flexors    n    n             Min to N Min to N   Quadriceps    n    mod             Min to N Min to N   Gastroc    n    n             Min to N Min to N   Gluteals    n    n             Min to N Min to N   Piriformis    n    n             Min to N Min to N     (S) Severe ,(M) =Moderate, (Min) =Minimal (N) Normal tightness         Balance Test Evaluation Evaluation Re-eval Re-Eval Re-eval Re-eval   Date 6/30/2025 6/30/2025           Side  Involved side Uninvolved Involved  Involved  Involved  Involved    Single limb stance Increased hip sway  Neutral alignment           >10\" neutral                                                                            Myotomes LE Right  Left   L1/L2 Hip flexion         5/5         5/5   L3 knee extension         5/5         5/5   L4 Ankle dorsiflexion         5/5         5/5   L5 Big Toe extension         5/5         5/5   S1 Ankle plantar flexion         5/5         5/5   s2 Knee FLexion         5/5         5/5     DTR     SPECIAL TESTS:  Slump (sciatic tension): -  SLR (disc pathology): -  Prone Instability: -     Edema  mid knee 3 cm larger mid patella and at vmo  Atrophy of left VMO and quad        Function:         Squatting mechanics:   6/30/2025 Needs moderate verbal and or manual cueing to perform a proper squat.  Noted moderate trunk flexion    Re-eval =  Re-eval=     Gait:   6/30/2025 grossly normal. But noting compensation with initial walking from sitting  Re-eval =    Precautions:  lumbar pain and left knee mensical tears (complex)  [Past Medical History]    [Past Medical History]       Diagnosis Date    A-fib (HCC) 2023    Endometriosis       last assessed 07/17/2017    GERD (gastroesophageal reflux disease) 2022    Hyperlipidemia 2022    Hypothyroidism      MVP (mitral valve prolapse) 1980        Date        6/30/2025   " "7/2/25 7/9/2025         Visits # EVAL  2 3  4  5   6 (foto)   Manual                                                                               Neuro                iso ab ball squeeze x20   x20  20x          iso ab ab hip abd  -->  Rtb x 20  20x GTB                       quad sets X10 5\" hold   x10 ----                         TE               Bike  or nustep --->  6 min level 1 Nustep 10 min L1 NuStep           Hamstring SOS   Quad EOT Sos   10\" x 3 bi  10\" x 3 bi  manual           SLR   X10 ea   incr to 20 R / L 2 x 10 reps           bridging  x10  Incr to 20  With rtb  W/ RTB x 10 reps            standing hip Abd / ext    2 x 10    2 x 10 20x on round foam  2# R/L   (March, abd, ext)           Leg Press     seat 6 45 #  x20  seat 6  47 #  x20                 20x HR on 1/2 roll         TA                                                                               Gait                                                               Modalities                                               HEP educated in posture correction, proper seated ergonomics, joint protection strategies  x 15 mins              see medbridge and exercise above             "

## 2025-07-11 ENCOUNTER — OFFICE VISIT (OUTPATIENT)
Dept: PHYSICAL THERAPY | Facility: CLINIC | Age: 75
End: 2025-07-11
Attending: ORTHOPAEDIC SURGERY
Payer: COMMERCIAL

## 2025-07-11 DIAGNOSIS — M54.16 LUMBAR RADICULOPATHY: ICD-10-CM

## 2025-07-11 DIAGNOSIS — M25.562 LEFT KNEE PAIN, UNSPECIFIED CHRONICITY: Primary | ICD-10-CM

## 2025-07-11 PROCEDURE — 97112 NEUROMUSCULAR REEDUCATION: CPT

## 2025-07-11 PROCEDURE — 97110 THERAPEUTIC EXERCISES: CPT

## 2025-07-11 NOTE — PROGRESS NOTES
Daily Note     Today's date: 2025  Patient name: Suzanne Coelho  : 1950  MRN: [id number]  Referring provider: Ken Ayala MD  Dx:   Encounter Diagnosis     ICD-10-CM    1. Left knee pain, unspecified chronicity  M25.562       2. Lumbar radiculopathy  M54.16           Start Time: 1100          Subjective: The L knee pain is getting tolerable. Notes increased swelling last night.       Objective: See treatment diary below      Assessment: Tolerated treatment well. Patient demonstrated fatigue post treatment, exhibited good technique with therapeutic exercises, and would benefit from continued PT      Plan: Continue per plan of care.      Start Time: 1445  Stop Time: 1530  Total time in clinic (min): 45 minutes     Pt. arriving to clinic status with complaints of lumbar and left knee pain. Impaired range of motion, strength, functional mobility and motor performance due to localized inflammation and pain in left knee from DJD and resolving lower back pain from acute spasms x 1 week.     Due to current deficits, patient is limited functionally. Patient has been educated in posture correction, proper seated ergonomics, joint protection strategies to help mitigate pain and reduce inflammation . Patient would benefit from skilled physical therapy services to address their aforementioned functional limitations and progress towards prior level of function and independence with home exercise program which patient is expected to achieves within 12 visits      Short Term Goals:  4 weeks  - Initiate and advance home exercise program to self manage symptoms.-   - Improve postural awareness and demonstrate self postural correction.  - Improve AROM lumbar spine to minimal limitation or better to improve mobility  - Patient to reduce pain at worst to 3-4  / 10 at worst in knee in household chores to improve activity tolerance.  - knee fleixon to 130      Long Term Goals:  8 weeks  - Patient to exhibit full  independence with home exercise program for symptoms relief and prevention   - Patient to achieve knee ROM to WFL without increased pain - 0- 140  - Improve LE strength to 5/5 grossly to improve general mobility  - Improve sitting and standing tolerance to greater than 1 hr to cook dinner  - Improve lifting mechanics to lift items off floor with proper mechanics and neutral spine  - Improve FOTO score to predicted score or better.  - Improve transfers to minimal pain <2/10 with , sit to stand, transfers in and out of car        6/30/2025 FOTO:       predicted:     Impairments:    restricted ROM    decreased strength   pain with function   activity intolerance   weight bearing intolerance   imbalance   abnormal movement     Prognosis:  Excellent  Positive and negative prognostic indicator(s):  positive attitude about recovery and pain >3 months     Planned interventions:  home exercise program, patient education, manual therapy, graded activity, flexibility, strengthening, and balance and weight bearing training joint protection education, neutral spine education, pain management education     Duration in visits:  12  Frequency: 2 and 2-3 visits per week  Duration in weeks:  6  Plan of Care beginning date: 06/30/25  Plan of Care expiration date: 6 weeks - 8/11/2025  Treatment plan discussed with: Patient        History of Current Injury:  Chronic history of left knee pain with steroids and gel shots which all help a little bit.  Noting swelling in knee which is persistent.  Reports decreasing her activity level will help to reduce pain and swelling.  Doing bi-level stairs at home hurt the knee.  Reports active lifestyles with walking < 1 mile which hurts the knee and gardening outside.   Reports bending over to garden instead of squatting.  Denies any PT for the hip in the past but not the knee.  Last week had a lumbar spine from bending over and unable to move- Went to chiropractor  and walked out painfree.       Pain  Current pain ratin 20% of time it throbs  At best pain ratin  At worst pain ratin/10  Location: knee   Quality: throbbing, stiff  Relieving factors: rest , ice packs  Aggravating factors: standing from sitting and unsteadiness, standing too long to cook         Imaging: MRI- 1 year ago. Bone spurs, Osteo changes, bone edema, swelling and mensicus tear, x ray were mild thinning   Hobbies/Interests: walking, active lifestyle walking and gardening  Occupation:  none         POSTURE:      Standing:   Sitting:         Lumbar AROM limitations:    (* =  Pain) Evaluation Re-evaluation Re-evaluation Re-evaluation Goal   Date:              2025                                 Lumbar flexion:            nil  loss  painless          Min to Normal and painfree   Lumbar extension:       normal loss  painless            Painfree    R sidebending lateral flex nil  and normal loss  painless           To lateral knee   L sidebending lateral flex normal loss  painless            To lateral knee   l knee flexion minimal loss  tightness  125           L knee flexion  slight loss  painless  140dg              (S) Severe ,(M) =Moderate, (Min) =Minimal (N) Normal tightness   Comments:                       MMT Lower Extremity Evaluation Re-evaluation Re-evaluation Re-evaluation Goal   0 out of 5 0_/5 Right Left Right Left Right Left Right Left Right Left   Date 2025           Hip Flexion 5 /5  4/5              5/5   5/5   HIp Abduction  / 4 /5             5/5 5/5   Hip Adduction  / /5              5/5  5/5   HIp Extension 4+ /5 4+ /5             5/5 5/5   Knee Flexion    5/5    5/5             5/5 5/5   Knee Extension    5/    5/5             5/5 5/5   Ankle Dorsiflexion    5/5    5 /5             5/5 5/5   Ankle Plantarflexion     /    5 /5             5/ 5/5   VMO conraction Good/fair Fair/poor             /5                           Flexibility Evaluation Re-evaluation Re-evaluation  "Re-evaluation Goal   Date 6/30/2025             Right Left Right  Left Right  Left Right  Left Right  Left   Hamstrings    n    n               Min to N Min to N   HIp flexors    n    n             Min to N Min to N   Quadriceps    n    mod             Min to N Min to N   Gastroc    n    n             Min to N Min to N   Gluteals    n    n             Min to N Min to N   Piriformis    n    n             Min to N Min to N     (S) Severe ,(M) =Moderate, (Min) =Minimal (N) Normal tightness         Balance Test Evaluation Evaluation Re-eval Re-Eval Re-eval Re-eval   Date 6/30/2025 6/30/2025           Side  Involved side Uninvolved Involved  Involved  Involved  Involved    Single limb stance Increased hip sway  Neutral alignment           >10\" neutral                                                                            Myotomes LE Right  Left   L1/L2 Hip flexion         5/5         5/5   L3 knee extension         5/5         5/5   L4 Ankle dorsiflexion         5/5         5/5   L5 Big Toe extension         5/5         5/5   S1 Ankle plantar flexion         5/5         5/5   s2 Knee FLexion         5/5         5/5     DTR     SPECIAL TESTS:  Slump (sciatic tension): -  SLR (disc pathology): -  Prone Instability: -     Edema  mid knee 3 cm larger mid patella and at vmo  Atrophy of left VMO and quad        Function:         Squatting mechanics:   6/30/2025 Needs moderate verbal and or manual cueing to perform a proper squat.  Noted moderate trunk flexion    Re-eval =  Re-eval=     Gait:   6/30/2025 grossly normal. But noting compensation with initial walking from sitting  Re-eval =    Precautions:  lumbar pain and left knee mensical tears (complex)  [Past Medical History]    [Past Medical History]       Diagnosis Date    A-fib (HCC) 2023    Endometriosis       last assessed 07/17/2017    GERD (gastroesophageal reflux disease) 2022    Hyperlipidemia 2022    Hypothyroidism      MVP (mitral valve prolapse) 1980      " "  Date        6/30/2025 7/2/25 7/9/2025 7/11/2025       Visits # RINKUAL  2 3  4  5   6 (foto)   Manual                                                                               Neuro                iso ab ball squeeze x20   x20  20x Bridge w/ add x 10 reps         iso ab ab hip abd  -->  Rtb x 20  20x GTB   -------                    quad sets X10 5\" hold   x10 ---- ----                 forest retro gait @#10 x 10 reps        TE               Bike  or nustep --->  6 min level 1 Nustep 10 min L1 NuStep   10 min L3 NuStep         Hamstring SOS   Quad EOT Sos   10\" x 3 bi  10\" x 3 bi  manual  manual         SLR   X10 ea   incr to 20 R / L 2 x 10 reps  1# R / L 2 x 10 reps         bridging  x10  Incr to 20  With rtb  W/ RTB x 10 reps   W/ GTB x 10 reps          standing hip Abd / ext    2 x 10    2 x 10 20x on round foam  2# R/L   (March, abd, ext)  20x on round foam  2# R/L   (March, abd, ext)          Leg Press     seat 6 45 #  x20  seat 6  47 #  x20  seat 6  49 #  x20                20x HR on 1/2 roll 20x HR on 1/2 roll        TA                       STS 2 x 10 reps  18\" mat                Red PB slides x 10 reps                                         Gait                                                               Modalities                                               HEP educated in posture correction, proper seated ergonomics, joint protection strategies  x 15 mins              see medbridge and exercise above               "

## 2025-07-14 ENCOUNTER — OFFICE VISIT (OUTPATIENT)
Dept: PHYSICAL THERAPY | Facility: CLINIC | Age: 75
End: 2025-07-14
Attending: ORTHOPAEDIC SURGERY
Payer: COMMERCIAL

## 2025-07-14 DIAGNOSIS — M54.16 LUMBAR RADICULOPATHY: ICD-10-CM

## 2025-07-14 DIAGNOSIS — M25.562 LEFT KNEE PAIN, UNSPECIFIED CHRONICITY: Primary | ICD-10-CM

## 2025-07-14 PROCEDURE — 97112 NEUROMUSCULAR REEDUCATION: CPT

## 2025-07-14 PROCEDURE — 97110 THERAPEUTIC EXERCISES: CPT

## 2025-07-14 NOTE — PROGRESS NOTES
Daily Note     Today's date: 2025  Patient name: Suzanne Coelho  : 1950  MRN: [id number]  Referring provider: Ken Ayala MD  Dx:   Encounter Diagnosis     ICD-10-CM    1. Left knee pain, unspecified chronicity  M25.562       2. Lumbar radiculopathy  M54.16                      Subjective: Sore since Saturday.      Objective: See treatment diary below      Assessment: Tolerated treatment well. Patient demonstrated fatigue post treatment, exhibited good technique with therapeutic exercises, and would benefit from continued PT      Plan: Progress treatment as tolerated.       Start Time: 1445  Stop Time: 1530  Total time in clinic (min): 45 minutes     Pt. arriving to clinic status with complaints of lumbar and left knee pain. Impaired range of motion, strength, functional mobility and motor performance due to localized inflammation and pain in left knee from DJD and resolving lower back pain from acute spasms x 1 week.     Due to current deficits, patient is limited functionally. Patient has been educated in posture correction, proper seated ergonomics, joint protection strategies to help mitigate pain and reduce inflammation . Patient would benefit from skilled physical therapy services to address their aforementioned functional limitations and progress towards prior level of function and independence with home exercise program which patient is expected to achieves within 12 visits      Short Term Goals:  4 weeks  - Initiate and advance home exercise program to self manage symptoms.-   - Improve postural awareness and demonstrate self postural correction.  - Improve AROM lumbar spine to minimal limitation or better to improve mobility  - Patient to reduce pain at worst to 3-4  / 10 at worst in knee in household chores to improve activity tolerance.  - knee fleixon to 130      Long Term Goals:  8 weeks  - Patient to exhibit full independence with home exercise program for symptoms relief and  prevention   - Patient to achieve knee ROM to WFL without increased pain - 0- 140  - Improve LE strength to 5/5 grossly to improve general mobility  - Improve sitting and standing tolerance to greater than 1 hr to cook dinner  - Improve lifting mechanics to lift items off floor with proper mechanics and neutral spine  - Improve FOTO score to predicted score or better.  - Improve transfers to minimal pain <2/10 with , sit to stand, transfers in and out of car        2025 FOTO:       predicted:     Impairments:    restricted ROM    decreased strength   pain with function   activity intolerance   weight bearing intolerance   imbalance   abnormal movement     Prognosis:  Excellent  Positive and negative prognostic indicator(s):  positive attitude about recovery and pain >3 months     Planned interventions:  home exercise program, patient education, manual therapy, graded activity, flexibility, strengthening, and balance and weight bearing training joint protection education, neutral spine education, pain management education     Duration in visits:  12  Frequency: 2 and 2-3 visits per week  Duration in weeks:  6  Plan of Care beginning date: 25  Plan of Care expiration date: 6 weeks - 2025  Treatment plan discussed with: Patient        History of Current Injury:  Chronic history of left knee pain with steroids and gel shots which all help a little bit.  Noting swelling in knee which is persistent.  Reports decreasing her activity level will help to reduce pain and swelling.  Doing bi-level stairs at home hurt the knee.  Reports active lifestyles with walking < 1 mile which hurts the knee and gardening outside.   Reports bending over to garden instead of squatting.  Denies any PT for the hip in the past but not the knee.  Last week had a lumbar spine from bending over and unable to move- Went to chiropractor  and walked out painfree.      Pain  Current pain ratin 20% of time it throbs  At best pain  ratin  At worst pain ratin/10  Location: knee   Quality: throbbing, stiff  Relieving factors: rest , ice packs  Aggravating factors: standing from sitting and unsteadiness, standing too long to cook         Imaging: MRI- 1 year ago. Bone spurs, Osteo changes, bone edema, swelling and mensicus tear, x ray were mild thinning   Hobbies/Interests: walking, active lifestyle walking and gardening  Occupation:  none         POSTURE:      Standing:   Sitting:         Lumbar AROM limitations:    (* =  Pain) Evaluation Re-evaluation Re-evaluation Re-evaluation Goal   Date:              2025                                 Lumbar flexion:            nil  loss  painless          Min to Normal and painfree   Lumbar extension:       normal loss  painless            Painfree    R sidebending lateral flex nil  and normal loss  painless           To lateral knee   L sidebending lateral flex normal loss  painless            To lateral knee   l knee flexion minimal loss  tightness  125           L knee flexion  slight loss  painless  140dg              (S) Severe ,(M) =Moderate, (Min) =Minimal (N) Normal tightness   Comments:                       MMT Lower Extremity Evaluation Re-evaluation Re-evaluation Re-evaluation Goal   0 out of 5 0_/5 Right Left Right Left Right Left Right Left Right Left   Date 2025           Hip Flexion 5 /5  4/5              5/5   5/5   HIp Abduction  / 4 /             5/ 5/5   Hip Adduction  / 5 /              5/5  5/5   HIp Extension 4+ /5 4+ /5             /5 5/5   Knee Flexion    /    5/5             5/5 5/5   Knee Extension    /    5/             5/ 5/5   Ankle Dorsiflexion    /    5 /             5/ 5/5   Ankle Plantarflexion     /    5 /             5/ 5/   VMO conraction Good/fair Fair/poor             5                           Flexibility Evaluation Re-evaluation Re-evaluation Re-evaluation Goal   Date 2025             Right Left Right  Left Right   "Left Right  Left Right  Left   Hamstrings    n    n               Min to N Min to N   HIp flexors    n    n             Min to N Min to N   Quadriceps    n    mod             Min to N Min to N   Gastroc    n    n             Min to N Min to N   Gluteals    n    n             Min to N Min to N   Piriformis    n    n             Min to N Min to N     (S) Severe ,(M) =Moderate, (Min) =Minimal (N) Normal tightness         Balance Test Evaluation Evaluation Re-eval Re-Eval Re-eval Re-eval   Date 6/30/2025 6/30/2025           Side  Involved side Uninvolved Involved  Involved  Involved  Involved    Single limb stance Increased hip sway  Neutral alignment           >10\" neutral                                                                            Myotomes LE Right  Left   L1/L2 Hip flexion         5/5         5/5   L3 knee extension         5/5         5/5   L4 Ankle dorsiflexion         5/5         5/5   L5 Big Toe extension         5/5         5/5   S1 Ankle plantar flexion         5/5         5/5   s2 Knee FLexion         5/5         5/5     DTR     SPECIAL TESTS:  Slump (sciatic tension): -  SLR (disc pathology): -  Prone Instability: -     Edema  mid knee 3 cm larger mid patella and at vmo  Atrophy of left VMO and quad        Function:         Squatting mechanics:   6/30/2025 Needs moderate verbal and or manual cueing to perform a proper squat.  Noted moderate trunk flexion    Re-eval =  Re-eval=     Gait:   6/30/2025 grossly normal. But noting compensation with initial walking from sitting  Re-eval =    Precautions:  lumbar pain and left knee mensical tears (complex)  [Past Medical History]    [Past Medical History]       Diagnosis Date    A-fib (HCC) 2023    Endometriosis       last assessed 07/17/2017    GERD (gastroesophageal reflux disease) 2022    Hyperlipidemia 2022    Hypothyroidism      MVP (mitral valve prolapse) 1980        Date        6/30/2025 7/2/25 7/9/2025 7/11/2025 7/14/2025     Visits # EVAL " " 2 3  4  5   6 (foto)   Manual                                                                               Neuro                iso ab ball squeeze x20   x20  20x Bridge w/ add x 10 reps  Bridge w/ add x 10 reps        iso ab ab hip abd  -->  Rtb x 20  20x GTB   -------  ---                  quad sets X10 5\" hold   x10 ---- ----  ---               forest retro gait @#10 x 10 reps  deferred      TE               Bike  or nustep --->  6 min level 1 Nustep 10 min L1 NuStep   10 min L3 NuStep   10 min L1 NuStep        Hamstring SOS   Quad EOT Sos   10\" x 3 bi  10\" x 3 bi  manual  manual  manual       SLR   X10 ea   incr to 20 R / L 2 x 10 reps  1# R / L 2 x 10 reps   R/L 2x10 reps 0#      bridging  x10  Incr to 20  With rtb  W/ RTB x 10 reps   W/ GTB x 10 reps   W/ GTB x 10 reps        standing hip Abd / ext    2 x 10    2 x 10 20x on round foam  2# R/L   (March, abd, ext)  20x on round foam  2# R/L   (March, abd, ext)   20x on round foam  2# R/L   (March, abd, ext)        Leg Press     seat 6 45 #  x20  seat 6  47 #  x20  seat 6  49 #  x20   seat 6  45 #  2x15 reps             20x HR on 1/2 roll 20x HR on 1/2 roll  10x HR on 1/2 roll       TA                       STS 2 x 10 reps  18\" mat   STS 2 x 10 reps  19\" mat              Red PB slides x 10 reps    Red PB slides x 10 reps                                      Gait                                                               Modalities                                               HEP educated in posture correction, proper seated ergonomics, joint protection strategies  x 15 mins              see medbridge and exercise above                 "

## 2025-07-17 ENCOUNTER — OFFICE VISIT (OUTPATIENT)
Dept: PHYSICAL THERAPY | Facility: CLINIC | Age: 75
End: 2025-07-17
Attending: ORTHOPAEDIC SURGERY
Payer: COMMERCIAL

## 2025-07-17 DIAGNOSIS — M25.562 LEFT KNEE PAIN, UNSPECIFIED CHRONICITY: Primary | ICD-10-CM

## 2025-07-17 DIAGNOSIS — M54.16 LUMBAR RADICULOPATHY: ICD-10-CM

## 2025-07-17 PROCEDURE — 97112 NEUROMUSCULAR REEDUCATION: CPT | Performed by: PHYSICAL THERAPIST

## 2025-07-17 PROCEDURE — 97110 THERAPEUTIC EXERCISES: CPT | Performed by: PHYSICAL THERAPIST

## 2025-07-17 NOTE — PROGRESS NOTES
Daily Note     Today's date: 2025  Patient name: Suzanne Coelho  : 1950  MRN: [id number]  Referring provider: Ken Ayala MD  Dx:   Encounter Diagnosis     ICD-10-CM    1. Left knee pain, unspecified chronicity  M25.562       2. Lumbar radiculopathy  M54.16                      Subjective: The left knee remains swollen and sore      Objective: See treatment diary below      Assessment: Tolerated treatment well. Patient demonstrated fatigue post treatment and exhibited good technique with therapeutic exercises      Plan: Continue per plan of care.      Start Time: 1445  Stop Time: 1530  Total time in clinic (min): 45 minutes     Pt. arriving to clinic status with complaints of lumbar and left knee pain. Impaired range of motion, strength, functional mobility and motor performance due to localized inflammation and pain in left knee from DJD and resolving lower back pain from acute spasms x 1 week.     Due to current deficits, patient is limited functionally. Patient has been educated in posture correction, proper seated ergonomics, joint protection strategies to help mitigate pain and reduce inflammation . Patient would benefit from skilled physical therapy services to address their aforementioned functional limitations and progress towards prior level of function and independence with home exercise program which patient is expected to achieves within 12 visits      Short Term Goals:  4 weeks  - Initiate and advance home exercise program to self manage symptoms.-   - Improve postural awareness and demonstrate self postural correction.  - Improve AROM lumbar spine to minimal limitation or better to improve mobility  - Patient to reduce pain at worst to 3-4  / 10 at worst in knee in household chores to improve activity tolerance.  - knee fleixon to 130      Long Term Goals:  8 weeks  - Patient to exhibit full independence with home exercise program for symptoms relief and prevention   - Patient to  achieve knee ROM to WFL without increased pain - 0- 140  - Improve LE strength to 5/5 grossly to improve general mobility  - Improve sitting and standing tolerance to greater than 1 hr to cook dinner  - Improve lifting mechanics to lift items off floor with proper mechanics and neutral spine  - Improve FOTO score to predicted score or better.  - Improve transfers to minimal pain <2/10 with , sit to stand, transfers in and out of car        2025 FOTO:       predicted:     Impairments:    restricted ROM    decreased strength   pain with function   activity intolerance   weight bearing intolerance   imbalance   abnormal movement     Prognosis:  Excellent  Positive and negative prognostic indicator(s):  positive attitude about recovery and pain >3 months     Planned interventions:  home exercise program, patient education, manual therapy, graded activity, flexibility, strengthening, and balance and weight bearing training joint protection education, neutral spine education, pain management education     Duration in visits:  12  Frequency: 2 and 2-3 visits per week  Duration in weeks:  6  Plan of Care beginning date: 25  Plan of Care expiration date: 6 weeks - 2025  Treatment plan discussed with: Patient        History of Current Injury:  Chronic history of left knee pain with steroids and gel shots which all help a little bit.  Noting swelling in knee which is persistent.  Reports decreasing her activity level will help to reduce pain and swelling.  Doing bi-level stairs at home hurt the knee.  Reports active lifestyles with walking < 1 mile which hurts the knee and gardening outside.   Reports bending over to garden instead of squatting.  Denies any PT for the hip in the past but not the knee.  Last week had a lumbar spine from bending over and unable to move- Went to chiropractor  and walked out painfree.      Pain  Current pain ratin 20% of time it throbs  At best pain ratin  At worst pain  ratin/10  Location: knee   Quality: throbbing, stiff  Relieving factors: rest , ice packs  Aggravating factors: standing from sitting and unsteadiness, standing too long to cook         Imaging: MRI- 1 year ago. Bone spurs, Osteo changes, bone edema, swelling and mensicus tear, x ray were mild thinning   Hobbies/Interests: walking, active lifestyle walking and gardening  Occupation:  none         POSTURE:      Standing:   Sitting:         Lumbar AROM limitations:    (* =  Pain) Evaluation Re-evaluation Re-evaluation Re-evaluation Goal   Date:              2025                                 Lumbar flexion:            nil  loss  painless          Min to Normal and painfree   Lumbar extension:       normal loss  painless            Painfree    R sidebending lateral flex nil  and normal loss  painless           To lateral knee   L sidebending lateral flex normal loss  painless            To lateral knee   l knee flexion minimal loss  tightness  125           L knee flexion  slight loss  painless  140dg              (S) Severe ,(M) =Moderate, (Min) =Minimal (N) Normal tightness   Comments:                       MMT Lower Extremity Evaluation Re-evaluation Re-evaluation Re-evaluation Goal   0 out of 5 0_/5 Right Left Right Left Right Left Right Left Right Left   Date 2025           Hip Flexion 5 /5  4/5              5/5   5/5   HIp Abduction 5 /5 4 /5             5/5 5/5   Hip Adduction 5 /5 5 /5              5/5  5/5   HIp Extension 4+ /5 4+ /5             5/5 5/5   Knee Flexion    5/5    5/5             5/5 5/5   Knee Extension    5/5    5/5             5/5 5/5   Ankle Dorsiflexion    5/5    5 /5             5/5 5/5   Ankle Plantarflexion    5 /    5 /5             5/ 5/5   VMO conraction Good/fair Fair/poor             /5                           Flexibility Evaluation Re-evaluation Re-evaluation Re-evaluation Goal   Date 2025             Right Left Right  Left Right  Left Right  Left Right   "Left   Hamstrings    n    n               Min to N Min to N   HIp flexors    n    n             Min to N Min to N   Quadriceps    n    mod             Min to N Min to N   Gastroc    n    n             Min to N Min to N   Gluteals    n    n             Min to N Min to N   Piriformis    n    n             Min to N Min to N     (S) Severe ,(M) =Moderate, (Min) =Minimal (N) Normal tightness         Balance Test Evaluation Evaluation Re-eval Re-Eval Re-eval Re-eval   Date 6/30/2025 6/30/2025           Side  Involved side Uninvolved Involved  Involved  Involved  Involved    Single limb stance Increased hip sway  Neutral alignment           >10\" neutral                                                                            Myotomes LE Right  Left   L1/L2 Hip flexion         5/5         5/5   L3 knee extension         5/5         5/5   L4 Ankle dorsiflexion         5/5         5/5   L5 Big Toe extension         5/5         5/5   S1 Ankle plantar flexion         5/5         5/5   s2 Knee FLexion         5/5         5/5     DTR     SPECIAL TESTS:  Slump (sciatic tension): -  SLR (disc pathology): -  Prone Instability: -     Edema  mid knee 3 cm larger mid patella and at vmo  Atrophy of left VMO and quad        Function:         Squatting mechanics:   6/30/2025 Needs moderate verbal and or manual cueing to perform a proper squat.  Noted moderate trunk flexion    Re-eval =  Re-eval=     Gait:   6/30/2025 grossly normal. But noting compensation with initial walking from sitting  Re-eval =    Precautions:  lumbar pain and left knee mensical tears (complex)  [Past Medical History]    [Past Medical History]       Diagnosis Date    A-fib (HCC) 2023    Endometriosis       last assessed 07/17/2017    GERD (gastroesophageal reflux disease) 2022    Hyperlipidemia 2022    Hypothyroidism      MVP (mitral valve prolapse) 1980        Date        6/30/2025 7/2/25 7/9/2025 7/11/2025 7/14/2025 7/17   Visits # EVAL  2 3  4  5   6 " "(foto)   Manual                                                                               Neuro                iso ab ball squeeze x20   x20  20x Bridge w/ add x 10 reps  Bridge w/ add x 10 reps        iso ab ab hip abd  -->  Rtb x 20  20x GTB   -------  ---                  quad sets X10 5\" hold   x10 ---- ----  ---  2x10             forest retro gait @#10 x 10 reps  deferred  #9 4x10    TE               Bike  or nustep --->  6 min level 1 Nustep 10 min L1 NuStep   10 min L3 NuStep   10 min L1 NuStep   10m L3 nustep     Hamstring SOS   Quad EOT Sos   10\" x 3 bi  10\" x 3 bi  manual  manual  manual       SLR   X10 ea   incr to 20 R / L 2 x 10 reps  1# R / L 2 x 10 reps   R/L 2x10 reps 0# 2x10     bridging  x10  Incr to 20  With rtb  W/ RTB x 10 reps   W/ GTB x 10 reps   W/ GTB x 10 reps   GTB 20x     standing hip Abd / ext    2 x 10    2 x 10 20x on round foam  2# R/L   (March, abd, ext)  20x on round foam  2# R/L   (March, abd, ext)   20x on round foam  2# R/L   (March, abd, ext)        Leg Press     seat 6 45 #  x20  seat 6  47 #  x20  seat 6  49 #  x20   seat 6  45 #  2x15 reps  #45 2x10           20x HR on 1/2 roll 20x HR on 1/2 roll  10x HR on 1/2 roll       TA                       STS 2 x 10 reps  18\" mat   STS 2 x 10 reps  19\" mat              Red PB slides x 10 reps    Red PB slides x 10 reps                                      Gait                                                               Modalities                                               HEP educated in posture correction, proper seated ergonomics, joint protection strategies  x 15 mins              see medbridge and exercise above                   "

## 2025-07-25 ENCOUNTER — OFFICE VISIT (OUTPATIENT)
Dept: PHYSICAL THERAPY | Facility: CLINIC | Age: 75
End: 2025-07-25
Attending: ORTHOPAEDIC SURGERY
Payer: COMMERCIAL

## 2025-07-25 DIAGNOSIS — M54.16 LUMBAR RADICULOPATHY: ICD-10-CM

## 2025-07-25 DIAGNOSIS — M25.562 LEFT KNEE PAIN, UNSPECIFIED CHRONICITY: Primary | ICD-10-CM

## 2025-07-25 PROCEDURE — 97110 THERAPEUTIC EXERCISES: CPT | Performed by: PHYSICAL MEDICINE & REHABILITATION

## 2025-07-25 PROCEDURE — 97112 NEUROMUSCULAR REEDUCATION: CPT | Performed by: PHYSICAL MEDICINE & REHABILITATION

## 2025-07-25 NOTE — PROGRESS NOTES
Daily Note     Today's date: 2025  Patient name: Suzanne Coelho  : 1950  MRN: [id number]  Referring provider: Ken Ayala MD  Dx:   Encounter Diagnosis     ICD-10-CM    1. Left knee pain, unspecified chronicity  M25.562       2. Lumbar radiculopathy  M54.16           Start Time: 1145  Stop Time: 1225  Total time in clinic (min): 40 minutes    Subjective: Patient reports her knee is swollen today, stating she has been exercising in her pool.      Objective: See treatment diary below      Assessment: Tolerated treatment well. Patient exhibited good technique with therapeutic exercises and would benefit from continued PT      Plan: Continue per plan of care.      Start Time: 1445  Stop Time: 1530  Total time in clinic (min): 45 minutes     Pt. arriving to clinic status with complaints of lumbar and left knee pain. Impaired range of motion, strength, functional mobility and motor performance due to localized inflammation and pain in left knee from DJD and resolving lower back pain from acute spasms x 1 week.     Due to current deficits, patient is limited functionally. Patient has been educated in posture correction, proper seated ergonomics, joint protection strategies to help mitigate pain and reduce inflammation . Patient would benefit from skilled physical therapy services to address their aforementioned functional limitations and progress towards prior level of function and independence with home exercise program which patient is expected to achieves within 12 visits      Short Term Goals:  4 weeks  - Initiate and advance home exercise program to self manage symptoms.-   - Improve postural awareness and demonstrate self postural correction.  - Improve AROM lumbar spine to minimal limitation or better to improve mobility  - Patient to reduce pain at worst to 3-4  / 10 at worst in knee in household chores to improve activity tolerance.  - knee fleixon to 130      Long Term Goals:  8 weeks  - Patient  to exhibit full independence with home exercise program for symptoms relief and prevention   - Patient to achieve knee ROM to WFL without increased pain - 0- 140  - Improve LE strength to 5/5 grossly to improve general mobility  - Improve sitting and standing tolerance to greater than 1 hr to cook dinner  - Improve lifting mechanics to lift items off floor with proper mechanics and neutral spine  - Improve FOTO score to predicted score or better.  - Improve transfers to minimal pain <2/10 with , sit to stand, transfers in and out of car        6/30/2025 FOTO:       predicted:     Impairments:    restricted ROM    decreased strength   pain with function   activity intolerance   weight bearing intolerance   imbalance   abnormal movement     Prognosis:  Excellent  Positive and negative prognostic indicator(s):  positive attitude about recovery and pain >3 months     Planned interventions:  home exercise program, patient education, manual therapy, graded activity, flexibility, strengthening, and balance and weight bearing training joint protection education, neutral spine education, pain management education     Duration in visits:  12  Frequency: 2 and 2-3 visits per week  Duration in weeks:  6  Plan of Care beginning date: 06/30/25  Plan of Care expiration date: 6 weeks - 8/11/2025  Treatment plan discussed with: Patient        History of Current Injury:  Chronic history of left knee pain with steroids and gel shots which all help a little bit.  Noting swelling in knee which is persistent.  Reports decreasing her activity level will help to reduce pain and swelling.  Doing bi-level stairs at home hurt the knee.  Reports active lifestyles with walking < 1 mile which hurts the knee and gardening outside.   Reports bending over to garden instead of squatting.  Denies any PT for the hip in the past but not the knee.  Last week had a lumbar spine from bending over and unable to move- Went to chiropractor  and walked out  painfree.      Pain  Current pain ratin 20% of time it throbs  At best pain ratin  At worst pain ratin/10  Location: knee   Quality: throbbing, stiff  Relieving factors: rest , ice packs  Aggravating factors: standing from sitting and unsteadiness, standing too long to cook         Imaging: MRI- 1 year ago. Bone spurs, Osteo changes, bone edema, swelling and mensicus tear, x ray were mild thinning   Hobbies/Interests: walking, active lifestyle walking and gardening  Occupation:  none         POSTURE:      Standing:   Sitting:         Lumbar AROM limitations:    (* =  Pain) Evaluation Re-evaluation Re-evaluation Re-evaluation Goal   Date:              2025                                 Lumbar flexion:            nil  loss  painless          Min to Normal and painfree   Lumbar extension:       normal loss  painless            Painfree    R sidebending lateral flex nil  and normal loss  painless           To lateral knee   L sidebending lateral flex normal loss  painless            To lateral knee   l knee flexion minimal loss  tightness  125           L knee flexion  slight loss  painless  140dg              (S) Severe ,(M) =Moderate, (Min) =Minimal (N) Normal tightness   Comments:                       MMT Lower Extremity Evaluation Re-evaluation Re-evaluation Re-evaluation Goal   0 out of 5 0_/5 Right Left Right Left Right Left Right Left Right Left   Date 2025           Hip Flexion 5 /5  4/5              5/5   5/5   HIp Abduction 5 /5 4 /5             5/5 5/5   Hip Adduction  / 5 /5              5/5  5/5   HIp Extension 4+ /5 4+ /5             5/5 5/5   Knee Flexion    5/5    5/5             5/5 5/5   Knee Extension    5/5    5/5             5/5 5/5   Ankle Dorsiflexion    5/5    5 /5             5/5 5/5   Ankle Plantarflexion    5 /    5 /5             5/5 5/5   VMO conraction Good/fair Fair/poor             /5                           Flexibility Evaluation Re-evaluation  "Re-evaluation Re-evaluation Goal   Date 6/30/2025             Right Left Right  Left Right  Left Right  Left Right  Left   Hamstrings    n    n               Min to N Min to N   HIp flexors    n    n             Min to N Min to N   Quadriceps    n    mod             Min to N Min to N   Gastroc    n    n             Min to N Min to N   Gluteals    n    n             Min to N Min to N   Piriformis    n    n             Min to N Min to N     (S) Severe ,(M) =Moderate, (Min) =Minimal (N) Normal tightness         Balance Test Evaluation Evaluation Re-eval Re-Eval Re-eval Re-eval   Date 6/30/2025 6/30/2025           Side  Involved side Uninvolved Involved  Involved  Involved  Involved    Single limb stance Increased hip sway  Neutral alignment           >10\" neutral                                                                            Myotomes LE Right  Left   L1/L2 Hip flexion         5/5         5/5   L3 knee extension         5/5         5/5   L4 Ankle dorsiflexion         5/5         5/5   L5 Big Toe extension         5/5         5/5   S1 Ankle plantar flexion         5/5         5/5   s2 Knee FLexion         5/5         5/5     DTR     SPECIAL TESTS:  Slump (sciatic tension): -  SLR (disc pathology): -  Prone Instability: -     Edema  mid knee 3 cm larger mid patella and at vmo  Atrophy of left VMO and quad        Function:         Squatting mechanics:   6/30/2025 Needs moderate verbal and or manual cueing to perform a proper squat.  Noted moderate trunk flexion    Re-eval =  Re-eval=     Gait:   6/30/2025 grossly normal. But noting compensation with initial walking from sitting  Re-eval =    Precautions:  lumbar pain and left knee mensical tears (complex)  [Past Medical History]    [Past Medical History]       Diagnosis Date   • A-fib (HCC) 2023   • Endometriosis       last assessed 07/17/2017   • GERD (gastroesophageal reflux disease) 2022   • Hyperlipidemia 2022   • Hypothyroidism     • MVP (mitral valve " "prolapse) 1980        Date 07/25/2025 7/2/25 7/9/2025 7/11/2025 7/14/2025 7/17   Visits # 7  2 3  4  5   6 (foto)   Manual                                                                          Neuro               iso ab ball squeeze 20x  x20  20x Bridge w/ add x 10 reps  Bridge w/ add x 10 reps        iso ab ab hip abd   Rtb x 20  20x GTB   -------  ---                  quad sets   x10 ---- ----  ---  2x10     Karly retro walk #10 10 laps      karly retro gait @#10 x 10 reps  deferred  #9 4x10    TE              Bike  or nustep 10 min  6 min level 1 Nustep 10 min L1 NuStep   10 min L3 NuStep   10 min L1 NuStep   10m L3 nustep     Hamstring SOS   Quad EOT Sos  10\" x 3 bi  10\" x 3 bi  manual  manual  manual       SLR   2x10   incr to 20 R / L 2 x 10 reps  1# R / L 2 x 10 reps   R/L 2x10 reps 0# 2x10     bridging   Incr to 20  With rtb  W/ RTB x 10 reps   W/ GTB x 10 reps   W/ GTB x 10 reps   GTB 20x     standing hip Abd / ext  20x on round foam  2# R/L   (March, abd, ext)   2 x 10    2 x 10 20x on round foam  2# R/L   (March, abd, ext)  20x on round foam  2# R/L   (March, abd, ext)   20x on round foam  2# R/L   (March, abd, ext)        Leg Press  seat 6  45 #  2x15 reps   seat 6 45 #  x20  seat 6  47 #  x20  seat 6  49 #  x20   seat 6  45 #  2x15 reps  #45 2x10     Heel raises 20x on 1/2 roll    20x HR on 1/2 roll 20x HR on 1/2 roll  10x HR on 1/2 roll   10x HR on 1/2 roll      TA               STS 19\" 20x      STS 2 x 10 reps  18\" mat   STS 2 x 10 reps  19\" mat      Heel slides Red PB     Red PB slides x 10 reps    Red PB slides x 10 reps                                    Gait                                                           Modalities                                            HEP educated in posture correction, proper seated ergonomics, joint protection strategies               see medbridge and exercise above                     " no none

## 2025-07-28 ENCOUNTER — OFFICE VISIT (OUTPATIENT)
Dept: PHYSICAL THERAPY | Facility: CLINIC | Age: 75
End: 2025-07-28
Attending: ORTHOPAEDIC SURGERY
Payer: COMMERCIAL

## 2025-07-28 DIAGNOSIS — M25.562 LEFT KNEE PAIN, UNSPECIFIED CHRONICITY: Primary | ICD-10-CM

## 2025-07-28 PROCEDURE — 97112 NEUROMUSCULAR REEDUCATION: CPT

## 2025-07-28 PROCEDURE — 97110 THERAPEUTIC EXERCISES: CPT

## 2025-07-30 ENCOUNTER — OFFICE VISIT (OUTPATIENT)
Dept: PHYSICAL THERAPY | Facility: CLINIC | Age: 75
End: 2025-07-30
Attending: ORTHOPAEDIC SURGERY
Payer: COMMERCIAL

## 2025-07-30 DIAGNOSIS — M54.16 LUMBAR RADICULOPATHY: ICD-10-CM

## 2025-07-30 DIAGNOSIS — M25.562 LEFT KNEE PAIN, UNSPECIFIED CHRONICITY: Primary | ICD-10-CM

## 2025-07-30 PROCEDURE — 97110 THERAPEUTIC EXERCISES: CPT | Performed by: PHYSICAL MEDICINE & REHABILITATION

## 2025-07-30 PROCEDURE — 97112 NEUROMUSCULAR REEDUCATION: CPT | Performed by: PHYSICAL MEDICINE & REHABILITATION

## 2025-08-06 ENCOUNTER — OFFICE VISIT (OUTPATIENT)
Dept: PHYSICAL THERAPY | Facility: CLINIC | Age: 75
End: 2025-08-06
Attending: ORTHOPAEDIC SURGERY
Payer: COMMERCIAL

## 2025-08-06 DIAGNOSIS — M25.562 LEFT KNEE PAIN, UNSPECIFIED CHRONICITY: Primary | ICD-10-CM

## 2025-08-06 PROCEDURE — 97112 NEUROMUSCULAR REEDUCATION: CPT

## 2025-08-06 PROCEDURE — 97110 THERAPEUTIC EXERCISES: CPT

## 2025-08-13 ENCOUNTER — OFFICE VISIT (OUTPATIENT)
Dept: PHYSICAL THERAPY | Facility: CLINIC | Age: 75
End: 2025-08-13
Attending: ORTHOPAEDIC SURGERY
Payer: COMMERCIAL

## 2025-08-20 ENCOUNTER — OFFICE VISIT (OUTPATIENT)
Dept: PHYSICAL THERAPY | Facility: CLINIC | Age: 75
End: 2025-08-20
Attending: ORTHOPAEDIC SURGERY
Payer: COMMERCIAL

## 2025-08-20 DIAGNOSIS — M25.562 LEFT KNEE PAIN, UNSPECIFIED CHRONICITY: Primary | ICD-10-CM

## 2025-08-20 DIAGNOSIS — M54.16 LUMBAR RADICULOPATHY: ICD-10-CM

## 2025-08-20 PROCEDURE — 97112 NEUROMUSCULAR REEDUCATION: CPT

## 2025-08-20 PROCEDURE — 97110 THERAPEUTIC EXERCISES: CPT

## 2025-08-22 ENCOUNTER — OFFICE VISIT (OUTPATIENT)
Dept: PHYSICAL THERAPY | Facility: CLINIC | Age: 75
End: 2025-08-22
Attending: ORTHOPAEDIC SURGERY
Payer: COMMERCIAL

## 2025-08-22 DIAGNOSIS — M54.16 LUMBAR RADICULOPATHY: ICD-10-CM

## 2025-08-22 DIAGNOSIS — M25.562 LEFT KNEE PAIN, UNSPECIFIED CHRONICITY: Primary | ICD-10-CM

## 2025-08-22 PROCEDURE — 97110 THERAPEUTIC EXERCISES: CPT | Performed by: PHYSICAL MEDICINE & REHABILITATION

## 2025-08-22 PROCEDURE — 97112 NEUROMUSCULAR REEDUCATION: CPT | Performed by: PHYSICAL MEDICINE & REHABILITATION
